# Patient Record
Sex: FEMALE | Race: OTHER | NOT HISPANIC OR LATINO
[De-identification: names, ages, dates, MRNs, and addresses within clinical notes are randomized per-mention and may not be internally consistent; named-entity substitution may affect disease eponyms.]

---

## 2017-02-07 ENCOUNTER — FORM ENCOUNTER (OUTPATIENT)
Age: 77
End: 2017-02-07

## 2017-02-08 ENCOUNTER — OUTPATIENT (OUTPATIENT)
Dept: OUTPATIENT SERVICES | Facility: HOSPITAL | Age: 77
LOS: 1 days | End: 2017-02-08
Payer: MEDICARE

## 2017-02-08 ENCOUNTER — APPOINTMENT (OUTPATIENT)
Dept: MRI IMAGING | Facility: CLINIC | Age: 77
End: 2017-02-08

## 2017-02-08 DIAGNOSIS — M47.14 OTHER SPONDYLOSIS WITH MYELOPATHY, THORACIC REGION: ICD-10-CM

## 2017-02-08 PROCEDURE — 72146 MRI CHEST SPINE W/O DYE: CPT

## 2017-02-11 ENCOUNTER — APPOINTMENT (OUTPATIENT)
Dept: MRI IMAGING | Facility: CLINIC | Age: 77
End: 2017-02-11

## 2017-02-17 DIAGNOSIS — E04.1 NONTOXIC SINGLE THYROID NODULE: ICD-10-CM

## 2017-02-22 ENCOUNTER — FORM ENCOUNTER (OUTPATIENT)
Age: 77
End: 2017-02-22

## 2017-02-23 ENCOUNTER — OUTPATIENT (OUTPATIENT)
Dept: OUTPATIENT SERVICES | Facility: HOSPITAL | Age: 77
LOS: 1 days | End: 2017-02-23
Payer: MEDICARE

## 2017-02-23 ENCOUNTER — APPOINTMENT (OUTPATIENT)
Dept: ULTRASOUND IMAGING | Facility: CLINIC | Age: 77
End: 2017-02-23

## 2017-02-23 DIAGNOSIS — E04.1 NONTOXIC SINGLE THYROID NODULE: ICD-10-CM

## 2017-02-23 PROCEDURE — 76536 US EXAM OF HEAD AND NECK: CPT

## 2017-03-04 ENCOUNTER — EMERGENCY (EMERGENCY)
Facility: HOSPITAL | Age: 77
LOS: 1 days | Discharge: HOME | End: 2017-03-04
Attending: EMERGENCY MEDICINE | Admitting: EMERGENCY MEDICINE
Payer: MEDICARE

## 2017-03-04 VITALS
SYSTOLIC BLOOD PRESSURE: 144 MMHG | HEART RATE: 69 BPM | DIASTOLIC BLOOD PRESSURE: 83 MMHG | RESPIRATION RATE: 16 BRPM | OXYGEN SATURATION: 99 % | TEMPERATURE: 98 F

## 2017-03-04 VITALS
OXYGEN SATURATION: 99 % | TEMPERATURE: 97 F | HEART RATE: 72 BPM | RESPIRATION RATE: 18 BRPM | WEIGHT: 119.93 LBS | HEIGHT: 63 IN | SYSTOLIC BLOOD PRESSURE: 146 MMHG | DIASTOLIC BLOOD PRESSURE: 80 MMHG

## 2017-03-04 DIAGNOSIS — R30.0 DYSURIA: ICD-10-CM

## 2017-03-04 DIAGNOSIS — M54.5 LOW BACK PAIN: ICD-10-CM

## 2017-03-04 DIAGNOSIS — M54.16 RADICULOPATHY, LUMBAR REGION: ICD-10-CM

## 2017-03-04 DIAGNOSIS — K21.9 GASTRO-ESOPHAGEAL REFLUX DISEASE WITHOUT ESOPHAGITIS: ICD-10-CM

## 2017-03-04 LAB
ALBUMIN SERPL ELPH-MCNC: 4.2 G/DL — SIGNIFICANT CHANGE UP (ref 3.3–5)
ALP SERPL-CCNC: 94 U/L — SIGNIFICANT CHANGE UP (ref 40–120)
ALT FLD-CCNC: 24 U/L RC — SIGNIFICANT CHANGE UP (ref 10–45)
ANION GAP SERPL CALC-SCNC: 13 MMOL/L — SIGNIFICANT CHANGE UP (ref 5–17)
APPEARANCE UR: CLEAR — SIGNIFICANT CHANGE UP
AST SERPL-CCNC: 28 U/L — SIGNIFICANT CHANGE UP (ref 10–40)
BACTERIA # UR AUTO: ABNORMAL /HPF
BASOPHILS # BLD AUTO: 0.1 K/UL — SIGNIFICANT CHANGE UP (ref 0–0.2)
BASOPHILS NFR BLD AUTO: 0.9 % — SIGNIFICANT CHANGE UP (ref 0–2)
BILIRUB SERPL-MCNC: 0.3 MG/DL — SIGNIFICANT CHANGE UP (ref 0.2–1.2)
BILIRUB UR-MCNC: NEGATIVE — SIGNIFICANT CHANGE UP
BUN SERPL-MCNC: 16 MG/DL — SIGNIFICANT CHANGE UP (ref 7–23)
CALCIUM SERPL-MCNC: 8.7 MG/DL — SIGNIFICANT CHANGE UP (ref 8.4–10.5)
CHLORIDE SERPL-SCNC: 104 MMOL/L — SIGNIFICANT CHANGE UP (ref 96–108)
CO2 SERPL-SCNC: 26 MMOL/L — SIGNIFICANT CHANGE UP (ref 22–31)
COLOR SPEC: SIGNIFICANT CHANGE UP
CREAT SERPL-MCNC: 0.6 MG/DL — SIGNIFICANT CHANGE UP (ref 0.5–1.3)
DIFF PNL FLD: NEGATIVE — SIGNIFICANT CHANGE UP
EOSINOPHIL # BLD AUTO: 0.1 K/UL — SIGNIFICANT CHANGE UP (ref 0–0.5)
EOSINOPHIL NFR BLD AUTO: 1.7 % — SIGNIFICANT CHANGE UP (ref 0–6)
EPI CELLS # UR: SIGNIFICANT CHANGE UP /HPF
GLUCOSE SERPL-MCNC: 92 MG/DL — SIGNIFICANT CHANGE UP (ref 70–99)
GLUCOSE UR QL: NEGATIVE — SIGNIFICANT CHANGE UP
HCT VFR BLD CALC: 41.7 % — SIGNIFICANT CHANGE UP (ref 34.5–45)
HGB BLD-MCNC: 14.2 G/DL — SIGNIFICANT CHANGE UP (ref 11.5–15.5)
KETONES UR-MCNC: NEGATIVE — SIGNIFICANT CHANGE UP
LEUKOCYTE ESTERASE UR-ACNC: ABNORMAL
LYMPHOCYTES # BLD AUTO: 2.3 K/UL — SIGNIFICANT CHANGE UP (ref 1–3.3)
LYMPHOCYTES # BLD AUTO: 37.2 % — SIGNIFICANT CHANGE UP (ref 13–44)
MCHC RBC-ENTMCNC: 32.2 PG — SIGNIFICANT CHANGE UP (ref 27–34)
MCHC RBC-ENTMCNC: 34.1 GM/DL — SIGNIFICANT CHANGE UP (ref 32–36)
MCV RBC AUTO: 94.4 FL — SIGNIFICANT CHANGE UP (ref 80–100)
MONOCYTES # BLD AUTO: 0.4 K/UL — SIGNIFICANT CHANGE UP (ref 0–0.9)
MONOCYTES NFR BLD AUTO: 7.3 % — SIGNIFICANT CHANGE UP (ref 2–14)
NEUTROPHILS # BLD AUTO: 3.2 K/UL — SIGNIFICANT CHANGE UP (ref 1.8–7.4)
NEUTROPHILS NFR BLD AUTO: 52.9 % — SIGNIFICANT CHANGE UP (ref 43–77)
NITRITE UR-MCNC: NEGATIVE — SIGNIFICANT CHANGE UP
PH UR: 6.5 — SIGNIFICANT CHANGE UP (ref 4.8–8)
PLATELET # BLD AUTO: 166 K/UL — SIGNIFICANT CHANGE UP (ref 150–400)
POTASSIUM SERPL-MCNC: 3.8 MMOL/L — SIGNIFICANT CHANGE UP (ref 3.5–5.3)
POTASSIUM SERPL-SCNC: 3.8 MMOL/L — SIGNIFICANT CHANGE UP (ref 3.5–5.3)
PROT SERPL-MCNC: 7.1 G/DL — SIGNIFICANT CHANGE UP (ref 6–8.3)
PROT UR-MCNC: NEGATIVE — SIGNIFICANT CHANGE UP
RBC # BLD: 4.42 M/UL — SIGNIFICANT CHANGE UP (ref 3.8–5.2)
RBC # FLD: 11.3 % — SIGNIFICANT CHANGE UP (ref 10.3–14.5)
RBC CASTS # UR COMP ASSIST: SIGNIFICANT CHANGE UP /HPF (ref 0–2)
SODIUM SERPL-SCNC: 143 MMOL/L — SIGNIFICANT CHANGE UP (ref 135–145)
SP GR SPEC: 1.01 — LOW (ref 1.01–1.02)
UROBILINOGEN FLD QL: NEGATIVE — SIGNIFICANT CHANGE UP
WBC # BLD: 6.1 K/UL — SIGNIFICANT CHANGE UP (ref 3.8–10.5)
WBC # FLD AUTO: 6.1 K/UL — SIGNIFICANT CHANGE UP (ref 3.8–10.5)
WBC UR QL: SIGNIFICANT CHANGE UP /HPF (ref 0–5)

## 2017-03-04 PROCEDURE — 80053 COMPREHEN METABOLIC PANEL: CPT

## 2017-03-04 PROCEDURE — 99284 EMERGENCY DEPT VISIT MOD MDM: CPT | Mod: 25

## 2017-03-04 PROCEDURE — 74176 CT ABD & PELVIS W/O CONTRAST: CPT

## 2017-03-04 PROCEDURE — 74176 CT ABD & PELVIS W/O CONTRAST: CPT | Mod: 26

## 2017-03-04 PROCEDURE — 85027 COMPLETE CBC AUTOMATED: CPT

## 2017-03-04 PROCEDURE — 99284 EMERGENCY DEPT VISIT MOD MDM: CPT | Mod: GC

## 2017-03-04 PROCEDURE — 96374 THER/PROPH/DIAG INJ IV PUSH: CPT

## 2017-03-04 PROCEDURE — 81001 URINALYSIS AUTO W/SCOPE: CPT

## 2017-03-04 PROCEDURE — 87086 URINE CULTURE/COLONY COUNT: CPT

## 2017-03-04 PROCEDURE — 96375 TX/PRO/DX INJ NEW DRUG ADDON: CPT

## 2017-03-04 RX ORDER — CEPHALEXIN 500 MG
1 CAPSULE ORAL
Qty: 15 | Refills: 0
Start: 2017-03-04 | End: 2017-03-09

## 2017-03-04 RX ORDER — DEXAMETHASONE 0.5 MG/5ML
10 ELIXIR ORAL ONCE
Qty: 0 | Refills: 0 | Status: COMPLETED | OUTPATIENT
Start: 2017-03-04 | End: 2017-03-04

## 2017-03-04 RX ORDER — ACETAMINOPHEN 500 MG
1000 TABLET ORAL ONCE
Qty: 0 | Refills: 0 | Status: COMPLETED | OUTPATIENT
Start: 2017-03-04 | End: 2017-03-04

## 2017-03-04 RX ORDER — CEFTRIAXONE 500 MG/1
1 INJECTION, POWDER, FOR SOLUTION INTRAMUSCULAR; INTRAVENOUS ONCE
Qty: 0 | Refills: 0 | Status: COMPLETED | OUTPATIENT
Start: 2017-03-04 | End: 2017-03-04

## 2017-03-04 RX ADMIN — Medication 10 MILLIGRAM(S): at 20:21

## 2017-03-04 RX ADMIN — Medication 1000 MILLIGRAM(S): at 20:21

## 2017-03-04 RX ADMIN — CEFTRIAXONE 100 GRAM(S): 500 INJECTION, POWDER, FOR SOLUTION INTRAMUSCULAR; INTRAVENOUS at 19:53

## 2017-03-04 RX ADMIN — Medication 400 MILLIGRAM(S): at 18:55

## 2017-03-04 NOTE — ED ADULT NURSE REASSESSMENT NOTE - NS ED NURSE REASSESS COMMENT FT1
received a 76 y.o female aaox3 ambulatory with c/o upper rt back pain, h/o chronic back pain secondary to rheumatoid arthritis, positive foot drop, on foot braces for ambulation, afebrile, Tylenol IVPB ongoing. Due meds given as ordered, pending CT scan results and MD disposition.

## 2017-03-04 NOTE — ED PROVIDER NOTE - ATTENDING CONTRIBUTION TO CARE
**ATTENDING ADDENDUM (Dr. Juan J Howard): I attest that I have directly examined this patient and reviewed and formulated the diagnostic and therapeutic management plan in collaboration with the EM resident. Please see MDM note and remainder of EMR for findings from CC, HPI, ROS, and PE. (WHIT Olson)

## 2017-03-04 NOTE — ED PROVIDER NOTE - MEDICAL DECISION MAKING DETAILS
**ATTENDING MEDICAL DECISION MAKING/SYNTHESIS (Dr. Juan J Howard): I have reviewed the Chief Complaint, the HPI, the ROS, and have directly performed and confirmed the findings on the Physical Examination. I have reviewed the medical decision making with all providers, as applicable. The PROBLEM REPRESENTATION at this time is: 76-year-old woman with prior history of muscular dystrophy/musculoskeletal disorder and osteoporosis with progressively worsening radicular, movement-associated lumbar back pain (right-sided) and dysuria, unassociated with fevers, chills, trauma, or new neurologic symptoms e.g.  saddle anesthesia, incontinence, acute urinary retention. POSITIVE difficulty with movement, range of motion, and ambulation. The MOST LIKELY DIAGNOSIS, and the LIST OF DIFFERENTIAL DIAGNOSES, includes (but is not limited to) the following: radicular back pain from disk or other impingement (likely), serious bacterial infection or sepsis/severe sepsis e.g. diskitis, epidural abscess or equivalent (unlikely), urinary tract infection or pyelonephritis (less likely but considered), osteoporosis- or rheumatologic arthritis-associated symptoms (possible), osteoarthritis (possible), sprain/strain. The likelihood of each of these diagnoses has been appropriately considered in the context of this patient's presentation and my evaluation. PLAN: as described in EMR, including diagnostics, therapeutics and consultation as clinically warranted. I will continue to reevaluate the patient, including the results of all testing, and monitor response to therapy throughout the patient's course in the ED.

## 2017-03-04 NOTE — ED PROVIDER NOTE - PHYSICAL EXAMINATION
Constitutional: mild distress  Eyes: PERRLA EOMI  Head: Normocephalic atraumatic  Cardiac: regular rate   Resp: Lungs CTAB  GI: Abd s/nt/nd  Neuro: CN2-12 intact  Skin: No rashes  MSK: + opposite leg raise. no midline ttp no saddle anesthesia. Constitutional: mild distress  Eyes: PERRLA EOMI  Head: Normocephalic atraumatic  Cardiac: regular rate   Resp: Lungs CTAB  GI: Abd s/nt/nd  Neuro: CN2-12 intact  Skin: No rashes  MSK: + opposite leg raise. no midline ttp no saddle anesthesia.  **ATTENDING ADDENDUM (Dr. Juan J Howard): I have reviewed and substantially contributed to the elements of the PE as documented above. I have directly performed an examination of this patient in conjunction with the other members (EM resident/PA/NP) of the patient care team. Of note, and in addition to the above, POSITIVE movement-associated, reproducible lumbar back pain, right-sided, with radicular component to right anterior leg. POSITIVE patellar reflexes 2+ symmetrically equal bilaterally. NO evidence of discoloration indicating acute arterial insufficiency at this time. NO unilateral soft-tissue swelling, cords, or calf tenderness noted. NO saddle anesthesia. NO cervical-thoracic-lumbar-sacral midline bony tenderness of stepoff. NO rashes, lesions, vesicles, cellulitis, petechiae, purpurae, track marks or ecchymoses. NO warmth over spine.

## 2017-03-04 NOTE — ED PROVIDER NOTE - DURATION
day(s)/**ATTENDING ADDENDUM (Dr. Juan J Howard): duration of two weeks, worsened more over past few days

## 2017-03-04 NOTE — ED PROVIDER NOTE - SKIN TEMP
**ATTENDING ADDENDUM (Dr. Juan J Howard): bilaterally, but without demarcation, bilateral upper and lower extremities (likely environmental)/COOL

## 2017-03-04 NOTE — ED PROVIDER NOTE - CONSTITUTIONAL NOURISHMENT, MLM
**ATTENDING ADDENDUM (Dr. Juan J Howard): chronically ill, with some atrophy noted, bilateral foot drop

## 2017-03-04 NOTE — ED PROVIDER NOTE - EYES, MLM
Clear bilaterally, pupils equal, round and reactive to light. **ATTENDING ADDENDUM (Dr. Juan J Howard): Extraocular muscle movements intact. NO nystagmus.

## 2017-03-04 NOTE — ED PROVIDER NOTE - NS ED ROS FT
Constitutional: No fever or chills  Eyes: No visual changes  HEENT: No throat pain  CV: No chest pain  Resp: No SOB no cough  GI: No abd pain, nausea or vomiting  : + dysuria  MSK: right flank pain  Skin: No rash  Neuro: No headache

## 2017-03-04 NOTE — ED PROVIDER NOTE - PLAN OF CARE
Take Tylenol 1000 mg or ibuprofen 600 mg every 6 hours with food as needed for pain. Follow up with your primary doctor tomorrow. Return to the Emergency Dept if you develop any new or worsening symptoms ATTENDING ADDENDUM (Dr. Juan J Howard): Goals of care include resolution of emergent/urgent symptoms and concerns, and restoration to baseline level of homeostasis.

## 2017-03-04 NOTE — ED PROVIDER NOTE - GASTROINTESTINAL, MLM
Abdomen soft, non-tender **ATTENDING ADDENDUM (Dr. Juan J Howard): NO guarding, rebound, or rigidity. NO pulsatile or non-pulsatile masses. NO hernias. NO obvious hepatosplenomegaly. NO tenderness over the right lower quadrant area with palpation.

## 2017-03-04 NOTE — ED PROVIDER NOTE - OBJECTIVE STATEMENT
76F hx muscular dystrophy presents with right flank pain. pain present for 2 weeks. Radiates down right leg. has tried multiple analgesics (cymbalta, mobic, lido patches) at home with minimal help. no fevers night sweats weight loss. no midline pain. + dysuria. No change in ability to walk. no numbness. no skin rash. pain shoots down leg worse with movement 76F hx muscular dystrophy presents with right flank pain. pain present for 2 weeks. Radiates down right leg. has tried multiple analgesics (cymbalta, mobic, lido patches) at home with minimal help. no fevers night sweats weight loss. no midline pain. + dysuria. No change in ability to walk. no numbness. no skin rash. pain shoots down leg worse with movement.  **ATTENDING ADDENDUM (Dr. Juan J Howard): I attest that I have directly and personally interviewed and examined this patient and elicited a comparable history of present illness and review of systems as documented, along with my EM resident. I attest that I have made significant contributions to the documentation where necessary and as noted in the EMR. Of note, and in addition to the above, patient is a 76-year-old woman with a past medical/surgical history of chronic pain from rheumatologic/musculoskeletal disorder (once characterized as Charcot-Kassidy-Tooth, but daughter and primary care physician/provider's have been questioning this prior diagnosis) now with progressively worsening right-sided radicular back pain (lumbar, radiating to the right lower extremity) over the past two weeks. NO fevers or chills. NO nausea or vomiting. NO acute urinary retention or incontinence, but with some dysuria (burning). POSITIVE back pain and stiffness, radicular, movement-associated, reproducible with 76F hx muscular dystrophy presents with right flank pain. pain present for 2 weeks. Radiates down right leg. has tried multiple analgesics (cymbalta, mobic, lido patches) at home with minimal help. no fevers night sweats weight loss. no midline pain. + dysuria. No change in ability to walk. no numbness. no skin rash. pain shoots down leg worse with movement.  **ATTENDING ADDENDUM (Dr. Juan J Howard): I attest that I have directly and personally interviewed and examined this patient and elicited a comparable history of present illness and review of systems as documented, along with my EM resident. I attest that I have made significant contributions to the documentation where necessary and as noted in the EMR. Of note, and in addition to the above, patient is a 76-year-old woman with a past medical/surgical history of chronic pain from rheumatologic/musculoskeletal disorder (once characterized as Charcot-Kassidy-Tooth, but daughter and primary care physician/provider's have been questioning this prior diagnosis) now with progressively worsening right-sided radicular back pain (lumbar, radiating to the right lower extremity) over the past two weeks. NO fevers or chills. NO nausea or vomiting. NO acute urinary retention or incontinence, but with some dysuria (burning). POSITIVE back pain and stiffness, radicular, movement-associated, reproducible with palpation and twisting. NO relief with lidocaine transdermal, Mobic, and Cymbalta. Daughter reports some distal discoloration and coldness of the bilateral lower extremities (POSITIVE chronic foot drop in the bilateral lower extremities). NO saddle anesthesia. NO new falls. NO rashes in the back. Here for evaluation. VAS 3/10. POSITIVE walks with walker chronically (non-adherent at times).

## 2017-03-04 NOTE — ED ADULT NURSE NOTE - OBJECTIVE STATEMENT
76y f pt with hx of osteoporosis; drop foot; sarcoma retooth; 76y f pt with hx of osteoporosis; drop foot; sarcoma retooth; c/o r flank pain radiating to r abd and down to groin; pt states has been going on for 3 weeks; aox3; no resp distress; + sob on exertion; no chest pain; no n/v/d; + constipation; + dysuria; + burning on urination; + urinary retention; no numbness/tingling; no incontinence; daughter says pt lower legs have changed color; pt having more difficulty ambulating; iv placed; labs sent per md order

## 2017-03-04 NOTE — ED PROVIDER NOTE - PROGRESS NOTE DETAILS
**ATTENDING ADDENDUM (Dr. Juan J Howard): case reviewed with primary care physician/provider LOLA Smith (neurology). [phone]. Aware and agrees with goals/plan of ED care at this time. Anticipatory guidance provided. Will continue to observe and monitor closely. **ATTENDING ADDENDUM (Dr. Juan J Howard): case reviewed with Dr. Grover. Patient awaiting completion of ED diagnostics. Will continue to observe and monitor closely. Anticipatory guidance provided. **ATTENDING ADDENDUM (Dr. Juan J Howard): patient serially evaluated throughout ED course. NO acute deterioration up to this time in the ED. Awaiting completion of CT. UA noted. Agree with dexamethasone and with antibiotics as ordered. Will continue to observe and monitor closely. **ATTENDING ADDENDUM (Dr. Juan J Howard): case reviewed with primary care physician/provider LOLA Smith (neurology). [phone]. Aware and agrees with goals/plan of ED care at this time. Agrees with deferring ED neurology consultation at this time pending ED workup results. Anticipatory guidance provided. Will continue to observe and monitor closely. **ATTENDING ADDENDUM (Dr. Juan J Howard): patient serially evaluated throughout ED course. NO acute deterioration up to this time in the ED. Preliminary CT results and results of other ED diagnostics reviewed with patient and family. Awaiting formal attending results. Anticipatory guidance provided. Will continue to observe and monitor closely. **ATTENDING ADDENDUM (Dr. Juan J Howard): patient serially evaluated throughout ED course. NO acute deterioration up to this time in the ED. POSITIVE improved at time of discharge (ambulatory). Anticipatory guidance provided re: steroids given in ED, need for neurology followup with primary care physician/provider Smith for physical therapy/occupational therapy and for therapeutic/diagnostic intensity (e.g. MRI for possible disk herniation), and for antibiotics (based on UA and dysuria). Agree with discharge home with close outpatient followup with primary care physician/provider and with medications (if appropriate, if clinically indicated, and as prescribed, as noted in EMR). NO evidence of fracture, serious bacterial infection or sepsis/severe sepsis, or acute cord/spine emergency requiring spine consultation at this time. NO evidence of diskitis at this time.

## 2017-03-04 NOTE — ED PROVIDER NOTE - CHPI ED SYMPTOMS NEG
**ATTENDING ADDENDUM (Dr. Juan J Howard): POSITIVE chronic foot drop (bilaterally)/no bowel dysfunction/no numbness/no bladder dysfunction/no tingling

## 2017-03-04 NOTE — ED PROVIDER NOTE - MUSCULOSKELETAL [+], MLM
**ATTENDING ADDENDUM (Dr. Juan J Howard): NO saddle anesthesia, NO incontinence, NO acute urinary retention, POSITIVE chronic back pain, POSITIVE chronic arthritis, POSITIVE musculoskeletal disease (chronic), POSITIVE history of osteoporosis/BACK PAIN/NECK PAIN

## 2017-03-04 NOTE — ED PROVIDER NOTE - CHPI ED SYMPTOMS POS
**ATTENDING ADDENDUM (Dr. Juan J Howard): radicular right-sided lumbar pain that wraps around anteriorly and down anterior right leg; also reports neck pain (left paraspinal area greater than right)/PAIN/TENDERNESS/BACK PAIN/NECK TENDERNESS/STIFFNESS/DIFFICULTY BEARING WEIGHT

## 2017-03-04 NOTE — ED PROVIDER NOTE - RELIEVING FACTORS
none/**ATTENDING ADDENDUM (Dr. Juan J Howard): transdermal lidocaine, Cymbalta, Mobic/over the counter medication

## 2017-03-04 NOTE — ED PROVIDER NOTE - CONDUCTED A DETAILED DISCUSSION WITH PATIENT AND/OR GUARDIAN REGARDING, MDM
need for outpatient follow-up/**ATTENDING ADDENDUM (Dr. Juan J Howard): Anticipatory guidance provided./lab results/radiology results/return to ED if symptoms worsen, persist or questions arise

## 2017-03-04 NOTE — ED PROVIDER NOTE - MUSCULOSKELETAL MINIMAL EXAM
neck supple/**ATTENDING ADDENDUM (Dr. Juan J Howard): POSITIVE right-sided lumbar spine pain, radicular, with radiation to the anterior right leg/TENDERNESS/RANGE OF MOTION LIMITED

## 2017-03-04 NOTE — ED ADULT NURSE NOTE - CHIEF COMPLAINT
The patient is a 76y Female complaining of The patient is a 76y Female complaining of flank pain with radiation

## 2017-03-04 NOTE — ED PROVIDER NOTE - RESPIRATORY, MLM
Breath sounds clear and equal bilaterally. **ATTENDING ADDENDUM (Dr. Juan J Howard): NO wheezing, rales, rhonchi, crackles, stridor, drooling, retractions, nasal flaring, or tripoding.

## 2017-03-04 NOTE — ED PROVIDER NOTE - CARE PLAN
Principal Discharge DX:	Back pain  Instructions for follow-up, activity and diet:	Take Tylenol 1000 mg or ibuprofen 600 mg every 6 hours with food as needed for pain. Follow up with your primary doctor tomorrow. Return to the Emergency Dept if you develop any new or worsening symptoms Principal Discharge DX:	Back pain  Goal:	ATTENDING ADDENDUM (Dr. Juan J Howard): Goals of care include resolution of emergent/urgent symptoms and concerns, and restoration to baseline level of homeostasis.  Instructions for follow-up, activity and diet:	Take Tylenol 1000 mg or ibuprofen 600 mg every 6 hours with food as needed for pain. Follow up with your primary doctor tomorrow. Return to the Emergency Dept if you develop any new or worsening symptoms  Secondary Diagnosis:	Radicular pain of lumbosacral region

## 2017-03-04 NOTE — ED PROVIDER NOTE - SKIN COLOR
**ATTENDING ADDENDUM (Dr. Juan J Howard): slight venous stasis skin changes noted in the bilateral lower extremities without severe edema

## 2017-03-05 LAB
CULTURE RESULTS: SIGNIFICANT CHANGE UP
SPECIMEN SOURCE: SIGNIFICANT CHANGE UP

## 2017-03-06 ENCOUNTER — FORM ENCOUNTER (OUTPATIENT)
Age: 77
End: 2017-03-06

## 2017-03-07 ENCOUNTER — OUTPATIENT (OUTPATIENT)
Dept: OUTPATIENT SERVICES | Facility: HOSPITAL | Age: 77
LOS: 1 days | End: 2017-03-07
Payer: MEDICARE

## 2017-03-07 ENCOUNTER — APPOINTMENT (OUTPATIENT)
Dept: MRI IMAGING | Facility: CLINIC | Age: 77
End: 2017-03-07

## 2017-03-07 DIAGNOSIS — M54.16 RADICULOPATHY, LUMBAR REGION: ICD-10-CM

## 2017-03-07 PROCEDURE — 72148 MRI LUMBAR SPINE W/O DYE: CPT

## 2017-04-13 ENCOUNTER — FORM ENCOUNTER (OUTPATIENT)
Age: 77
End: 2017-04-13

## 2017-04-14 ENCOUNTER — APPOINTMENT (OUTPATIENT)
Dept: NEUROLOGY | Facility: CLINIC | Age: 77
End: 2017-04-14

## 2017-04-14 ENCOUNTER — APPOINTMENT (OUTPATIENT)
Dept: CT IMAGING | Facility: CLINIC | Age: 77
End: 2017-04-14

## 2017-04-14 ENCOUNTER — OUTPATIENT (OUTPATIENT)
Dept: OUTPATIENT SERVICES | Facility: HOSPITAL | Age: 77
LOS: 1 days | End: 2017-04-14
Payer: MEDICARE

## 2017-04-14 VITALS
SYSTOLIC BLOOD PRESSURE: 137 MMHG | HEART RATE: 98 BPM | HEIGHT: 62 IN | WEIGHT: 130 LBS | DIASTOLIC BLOOD PRESSURE: 79 MMHG | BODY MASS INDEX: 23.92 KG/M2

## 2017-04-14 DIAGNOSIS — S32.9XXA FRACTURE OF UNSPECIFIED PARTS OF LUMBOSACRAL SPINE AND PELVIS, INITIAL ENCOUNTER FOR CLOSED FRACTURE: ICD-10-CM

## 2017-04-14 PROCEDURE — 76376 3D RENDER W/INTRP POSTPROCES: CPT

## 2017-04-14 PROCEDURE — 72192 CT PELVIS W/O DYE: CPT

## 2017-04-14 RX ORDER — CEPHALEXIN 500 MG/1
500 CAPSULE ORAL
Qty: 15 | Refills: 0 | Status: DISCONTINUED | COMMUNITY
Start: 2017-03-04

## 2017-04-14 RX ORDER — DULOXETINE HYDROCHLORIDE 20 MG/1
20 CAPSULE, DELAYED RELEASE PELLETS ORAL
Qty: 30 | Refills: 0 | Status: DISCONTINUED | COMMUNITY
Start: 2016-06-14

## 2017-04-20 ENCOUNTER — RX RENEWAL (OUTPATIENT)
Age: 77
End: 2017-04-20

## 2017-05-09 ENCOUNTER — RX RENEWAL (OUTPATIENT)
Age: 77
End: 2017-05-09

## 2017-05-16 ENCOUNTER — APPOINTMENT (OUTPATIENT)
Dept: PAIN MANAGEMENT | Facility: CLINIC | Age: 77
End: 2017-05-16

## 2017-06-14 ENCOUNTER — APPOINTMENT (OUTPATIENT)
Dept: PAIN MANAGEMENT | Facility: CLINIC | Age: 77
End: 2017-06-14

## 2017-06-14 VITALS
DIASTOLIC BLOOD PRESSURE: 81 MMHG | SYSTOLIC BLOOD PRESSURE: 151 MMHG | BODY MASS INDEX: 23.92 KG/M2 | HEIGHT: 62 IN | HEART RATE: 80 BPM | WEIGHT: 130 LBS

## 2017-08-08 ENCOUNTER — APPOINTMENT (OUTPATIENT)
Dept: PHYSICAL MEDICINE AND REHAB | Facility: CLINIC | Age: 77
End: 2017-08-08
Payer: MEDICARE

## 2017-08-08 VITALS
DIASTOLIC BLOOD PRESSURE: 75 MMHG | OXYGEN SATURATION: 97 % | HEART RATE: 83 BPM | SYSTOLIC BLOOD PRESSURE: 123 MMHG | TEMPERATURE: 98 F

## 2017-08-08 PROCEDURE — 99213 OFFICE O/P EST LOW 20 MIN: CPT

## 2017-09-27 ENCOUNTER — APPOINTMENT (OUTPATIENT)
Dept: PAIN MANAGEMENT | Facility: CLINIC | Age: 77
End: 2017-09-27
Payer: MEDICARE

## 2017-09-27 VITALS
SYSTOLIC BLOOD PRESSURE: 114 MMHG | DIASTOLIC BLOOD PRESSURE: 73 MMHG | HEIGHT: 62 IN | WEIGHT: 130 LBS | BODY MASS INDEX: 23.92 KG/M2 | HEART RATE: 93 BPM

## 2017-09-27 PROCEDURE — 99213 OFFICE O/P EST LOW 20 MIN: CPT | Mod: 25

## 2017-09-27 PROCEDURE — 20552 NJX 1/MLT TRIGGER POINT 1/2: CPT

## 2017-11-14 ENCOUNTER — APPOINTMENT (OUTPATIENT)
Dept: GERIATRICS | Facility: CLINIC | Age: 77
End: 2017-11-14
Payer: MEDICARE

## 2017-11-14 VITALS
DIASTOLIC BLOOD PRESSURE: 74 MMHG | WEIGHT: 131 LBS | SYSTOLIC BLOOD PRESSURE: 110 MMHG | TEMPERATURE: 98 F | RESPIRATION RATE: 16 BRPM | OXYGEN SATURATION: 95 % | HEIGHT: 62 IN | HEART RATE: 102 BPM | BODY MASS INDEX: 24.11 KG/M2

## 2017-11-14 DIAGNOSIS — G95.9 DISEASE OF SPINAL CORD, UNSPECIFIED: ICD-10-CM

## 2017-11-14 PROCEDURE — 99214 OFFICE O/P EST MOD 30 MIN: CPT

## 2017-11-14 RX ORDER — LIDOCAINE 5% 700 MG/1
5 PATCH TOPICAL
Qty: 30 | Refills: 3 | Status: COMPLETED | COMMUNITY
Start: 2017-04-14 | End: 2017-11-14

## 2017-11-16 LAB
ALBUMIN SERPL ELPH-MCNC: 3.9 G/DL
ALP BLD-CCNC: 99 U/L
ALT SERPL-CCNC: 21 U/L
ANION GAP SERPL CALC-SCNC: 15 MMOL/L
AST SERPL-CCNC: 26 U/L
BASOPHILS # BLD AUTO: 0.02 K/UL
BASOPHILS NFR BLD AUTO: 0.3 %
BILIRUB SERPL-MCNC: 0.3 MG/DL
BUN SERPL-MCNC: 20 MG/DL
CALCIUM SERPL-MCNC: 9.7 MG/DL
CHLORIDE SERPL-SCNC: 104 MMOL/L
CHOLEST SERPL-MCNC: 228 MG/DL
CHOLEST/HDLC SERPL: 2.9 RATIO
CO2 SERPL-SCNC: 26 MMOL/L
CREAT SERPL-MCNC: 0.78 MG/DL
EOSINOPHIL # BLD AUTO: 0.04 K/UL
EOSINOPHIL NFR BLD AUTO: 0.7 %
FOLATE SERPL-MCNC: 14.1 NG/ML
GLUCOSE SERPL-MCNC: 114 MG/DL
HCT VFR BLD CALC: 41.8 %
HDLC SERPL-MCNC: 80 MG/DL
HGB BLD-MCNC: 13.7 G/DL
IMM GRANULOCYTES NFR BLD AUTO: 0.2 %
LDLC SERPL CALC-MCNC: 122 MG/DL
LYMPHOCYTES # BLD AUTO: 2.44 K/UL
LYMPHOCYTES NFR BLD AUTO: 42.1 %
MAN DIFF?: NORMAL
MCHC RBC-ENTMCNC: 31.2 PG
MCHC RBC-ENTMCNC: 32.8 GM/DL
MCV RBC AUTO: 95.2 FL
MONOCYTES # BLD AUTO: 0.48 K/UL
MONOCYTES NFR BLD AUTO: 8.3 %
NEUTROPHILS # BLD AUTO: 2.81 K/UL
NEUTROPHILS NFR BLD AUTO: 48.4 %
PLATELET # BLD AUTO: 204 K/UL
POTASSIUM SERPL-SCNC: 4.2 MMOL/L
PROT SERPL-MCNC: 6.4 G/DL
RBC # BLD: 4.39 M/UL
RBC # FLD: 14 %
SODIUM SERPL-SCNC: 145 MMOL/L
TRIGL SERPL-MCNC: 130 MG/DL
TSH SERPL-ACNC: 3.26 UIU/ML
VIT B12 SERPL-MCNC: 633 PG/ML
WBC # FLD AUTO: 5.8 K/UL

## 2017-11-29 ENCOUNTER — RX RENEWAL (OUTPATIENT)
Age: 77
End: 2017-11-29

## 2018-01-08 ENCOUNTER — RX RENEWAL (OUTPATIENT)
Age: 78
End: 2018-01-08

## 2018-04-17 ENCOUNTER — RX RENEWAL (OUTPATIENT)
Age: 78
End: 2018-04-17

## 2018-06-05 ENCOUNTER — NON-APPOINTMENT (OUTPATIENT)
Age: 78
End: 2018-06-05

## 2018-06-05 ENCOUNTER — APPOINTMENT (OUTPATIENT)
Dept: GERIATRICS | Facility: CLINIC | Age: 78
End: 2018-06-05
Payer: MEDICARE

## 2018-06-05 VITALS
SYSTOLIC BLOOD PRESSURE: 150 MMHG | HEART RATE: 78 BPM | TEMPERATURE: 97.3 F | BODY MASS INDEX: 24.33 KG/M2 | OXYGEN SATURATION: 96 % | WEIGHT: 133 LBS | DIASTOLIC BLOOD PRESSURE: 80 MMHG

## 2018-06-05 PROCEDURE — 99215 OFFICE O/P EST HI 40 MIN: CPT

## 2018-06-06 LAB
ALBUMIN SERPL ELPH-MCNC: 4.2 G/DL
ALP BLD-CCNC: 99 U/L
ALT SERPL-CCNC: 22 U/L
ANION GAP SERPL CALC-SCNC: 13 MMOL/L
AST SERPL-CCNC: 27 U/L
BASOPHILS # BLD AUTO: 0.02 K/UL
BASOPHILS NFR BLD AUTO: 0.3 %
BILIRUB SERPL-MCNC: 0.6 MG/DL
BUN SERPL-MCNC: 17 MG/DL
CALCIUM SERPL-MCNC: 9.4 MG/DL
CHLORIDE SERPL-SCNC: 102 MMOL/L
CO2 SERPL-SCNC: 27 MMOL/L
CREAT SERPL-MCNC: 0.66 MG/DL
EOSINOPHIL # BLD AUTO: 0.08 K/UL
EOSINOPHIL NFR BLD AUTO: 1.4 %
GLUCOSE SERPL-MCNC: 92 MG/DL
HCT VFR BLD CALC: 43.1 %
HGB BLD-MCNC: 14.1 G/DL
IMM GRANULOCYTES NFR BLD AUTO: 0.2 %
LYMPHOCYTES # BLD AUTO: 2.39 K/UL
LYMPHOCYTES NFR BLD AUTO: 40.6 %
MAN DIFF?: NORMAL
MCHC RBC-ENTMCNC: 30.9 PG
MCHC RBC-ENTMCNC: 32.7 GM/DL
MCV RBC AUTO: 94.3 FL
MONOCYTES # BLD AUTO: 0.37 K/UL
MONOCYTES NFR BLD AUTO: 6.3 %
NEUTROPHILS # BLD AUTO: 3.02 K/UL
NEUTROPHILS NFR BLD AUTO: 51.2 %
PLATELET # BLD AUTO: 185 K/UL
POTASSIUM SERPL-SCNC: 4.2 MMOL/L
PROT SERPL-MCNC: 6.5 G/DL
RBC # BLD: 4.57 M/UL
RBC # FLD: 13.6 %
SODIUM SERPL-SCNC: 142 MMOL/L
TSH SERPL-ACNC: 2.91 UIU/ML
WBC # FLD AUTO: 5.89 K/UL

## 2018-06-18 ENCOUNTER — RX RENEWAL (OUTPATIENT)
Age: 78
End: 2018-06-18

## 2018-09-25 ENCOUNTER — APPOINTMENT (OUTPATIENT)
Dept: GERIATRICS | Facility: CLINIC | Age: 78
End: 2018-09-25
Payer: MEDICARE

## 2018-09-25 VITALS
OXYGEN SATURATION: 98 % | TEMPERATURE: 98.6 F | BODY MASS INDEX: 23.34 KG/M2 | WEIGHT: 127.6 LBS | DIASTOLIC BLOOD PRESSURE: 70 MMHG | SYSTOLIC BLOOD PRESSURE: 122 MMHG | HEART RATE: 84 BPM

## 2018-09-25 PROCEDURE — 99214 OFFICE O/P EST MOD 30 MIN: CPT

## 2018-09-25 RX ORDER — PREGABALIN 25 MG/1
25 CAPSULE ORAL
Qty: 30 | Refills: 0 | Status: COMPLETED | COMMUNITY
Start: 2018-08-03 | End: 2018-09-25

## 2018-09-25 RX ORDER — PREGABALIN 25 MG/1
25 CAPSULE ORAL
Qty: 30 | Refills: 1 | Status: COMPLETED | COMMUNITY
Start: 2017-11-14 | End: 2018-09-25

## 2018-09-25 RX ORDER — OXYCODONE 5 MG/1
5 TABLET ORAL
Qty: 30 | Refills: 0 | Status: COMPLETED | COMMUNITY
Start: 2018-06-05 | End: 2018-09-25

## 2018-11-27 ENCOUNTER — APPOINTMENT (OUTPATIENT)
Dept: GERIATRICS | Facility: CLINIC | Age: 78
End: 2018-11-27
Payer: MEDICARE

## 2018-11-27 VITALS
WEIGHT: 131 LBS | SYSTOLIC BLOOD PRESSURE: 120 MMHG | DIASTOLIC BLOOD PRESSURE: 70 MMHG | TEMPERATURE: 97.9 F | HEART RATE: 88 BPM | OXYGEN SATURATION: 96 % | HEIGHT: 62 IN | BODY MASS INDEX: 24.11 KG/M2 | RESPIRATION RATE: 16 BRPM

## 2018-11-27 DIAGNOSIS — E78.5 HYPERLIPIDEMIA, UNSPECIFIED: ICD-10-CM

## 2018-11-27 PROCEDURE — 99215 OFFICE O/P EST HI 40 MIN: CPT

## 2018-11-28 ENCOUNTER — RX RENEWAL (OUTPATIENT)
Age: 78
End: 2018-11-28

## 2019-02-06 ENCOUNTER — APPOINTMENT (OUTPATIENT)
Dept: VASCULAR SURGERY | Facility: CLINIC | Age: 79
End: 2019-02-06
Payer: MEDICARE

## 2019-02-06 VITALS
HEIGHT: 61 IN | BODY MASS INDEX: 24.55 KG/M2 | TEMPERATURE: 98.2 F | HEART RATE: 78 BPM | WEIGHT: 130 LBS | DIASTOLIC BLOOD PRESSURE: 76 MMHG | SYSTOLIC BLOOD PRESSURE: 130 MMHG

## 2019-02-06 PROCEDURE — 93970 EXTREMITY STUDY: CPT

## 2019-02-06 PROCEDURE — 99203 OFFICE O/P NEW LOW 30 MIN: CPT

## 2019-03-06 ENCOUNTER — RESULT REVIEW (OUTPATIENT)
Age: 79
End: 2019-03-06

## 2019-04-18 ENCOUNTER — RX RENEWAL (OUTPATIENT)
Age: 79
End: 2019-04-18

## 2019-05-31 ENCOUNTER — APPOINTMENT (OUTPATIENT)
Dept: MAMMOGRAPHY | Facility: IMAGING CENTER | Age: 79
End: 2019-05-31
Payer: MEDICARE

## 2019-05-31 ENCOUNTER — APPOINTMENT (OUTPATIENT)
Dept: RADIOLOGY | Facility: IMAGING CENTER | Age: 79
End: 2019-05-31
Payer: MEDICARE

## 2019-05-31 ENCOUNTER — OUTPATIENT (OUTPATIENT)
Dept: OUTPATIENT SERVICES | Facility: HOSPITAL | Age: 79
LOS: 1 days | End: 2019-05-31
Payer: MEDICARE

## 2019-05-31 DIAGNOSIS — Z00.00 ENCOUNTER FOR GENERAL ADULT MEDICAL EXAMINATION WITHOUT ABNORMAL FINDINGS: ICD-10-CM

## 2019-05-31 PROCEDURE — 77063 BREAST TOMOSYNTHESIS BI: CPT | Mod: 26

## 2019-05-31 PROCEDURE — 77063 BREAST TOMOSYNTHESIS BI: CPT

## 2019-05-31 PROCEDURE — 77067 SCR MAMMO BI INCL CAD: CPT | Mod: 26

## 2019-05-31 PROCEDURE — 77080 DXA BONE DENSITY AXIAL: CPT | Mod: 26

## 2019-05-31 PROCEDURE — 77067 SCR MAMMO BI INCL CAD: CPT

## 2019-05-31 PROCEDURE — 77080 DXA BONE DENSITY AXIAL: CPT

## 2019-12-31 ENCOUNTER — RX RENEWAL (OUTPATIENT)
Age: 79
End: 2019-12-31

## 2020-01-27 ENCOUNTER — APPOINTMENT (OUTPATIENT)
Dept: GERIATRICS | Facility: CLINIC | Age: 80
End: 2020-01-27
Payer: MEDICARE

## 2020-01-27 VITALS
BODY MASS INDEX: 26.09 KG/M2 | OXYGEN SATURATION: 95 % | TEMPERATURE: 98.2 F | HEART RATE: 83 BPM | DIASTOLIC BLOOD PRESSURE: 78 MMHG | WEIGHT: 138.2 LBS | SYSTOLIC BLOOD PRESSURE: 132 MMHG | RESPIRATION RATE: 16 BRPM | HEIGHT: 61 IN

## 2020-01-27 DIAGNOSIS — Z23 ENCOUNTER FOR IMMUNIZATION: ICD-10-CM

## 2020-01-27 DIAGNOSIS — M19.90 UNSPECIFIED OSTEOARTHRITIS, UNSPECIFIED SITE: ICD-10-CM

## 2020-01-27 DIAGNOSIS — R63.5 ABNORMAL WEIGHT GAIN: ICD-10-CM

## 2020-01-27 DIAGNOSIS — M21.372 FOOT DROP, RIGHT FOOT: ICD-10-CM

## 2020-01-27 DIAGNOSIS — M21.371 FOOT DROP, RIGHT FOOT: ICD-10-CM

## 2020-01-27 DIAGNOSIS — M81.0 AGE-RELATED OSTEOPOROSIS W/OUT CURRENT PATHOLOGICAL FRACTURE: ICD-10-CM

## 2020-01-27 PROCEDURE — 99214 OFFICE O/P EST MOD 30 MIN: CPT

## 2020-01-28 NOTE — HISTORY OF PRESENT ILLNESS
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 Pt has not fallen, no ED visits, no hospital visits. Daughters both here, Analia and Fátima, saying that they have noticed her repeating more often, doing less on her own.  Asking same questions over and over.  Daughters are frustrated.\par \par Pt denies pain, ROS negaitve except for lower leg neuropathic pain that is chronic

## 2020-01-28 NOTE — PHYSICAL EXAM
[General Appearance - Alert] : alert [General Appearance - In No Acute Distress] : in no acute distress [Sclera] : the sclera and conjunctiva were normal [PERRL With Normal Accommodation] : pupils were equal in size, round, and reactive to light [Extraocular Movements] : extraocular movements were intact [Normal Oral Mucosa] : normal oral mucosa [No Oral Pallor] : no oral pallor [No Oral Cyanosis] : no oral cyanosis [Outer Ear] : the ears and nose were normal in appearance [Oropharynx] : The oropharynx was normal [Neck Appearance] : the appearance of the neck was normal [Neck Cervical Mass (___cm)] : no neck mass was observed [Jugular Venous Distention Increased] : there was no jugular-venous distention [Thyroid Diffuse Enlargement] : the thyroid was not enlarged [Thyroid Nodule] : there were no palpable thyroid nodules [Auscultation Breath Sounds / Voice Sounds] : lungs were clear to auscultation bilaterally [Heart Rate And Rhythm] : heart rate was normal and rhythm regular [Heart Sounds] : normal S1 and S2 [Heart Sounds Gallop] : no gallops [Murmurs] : no murmurs [Heart Sounds Pericardial Friction Rub] : no pericardial rub [Full Pulse] : the pedal pulses are present [Edema] : there was no peripheral edema [Breast Appearance] : normal in appearance [Breast Palpation Mass] : no palpable masses [Bowel Sounds] : normal bowel sounds [Abdomen Soft] : soft [Abdomen Tenderness] : non-tender [Abdomen Mass (___ Cm)] : no abdominal mass palpated [External Female Genitalia] : normal external genitalia [Urethral Meatus] : normal urethra [Urinary Bladder Findings] : the bladder was normal on palpation [Cervical Lymph Nodes Enlarged Posterior Bilaterally] : posterior cervical [Cervical Lymph Nodes Enlarged Anterior Bilaterally] : anterior cervical [Supraclavicular Lymph Nodes Enlarged Bilaterally] : supraclavicular [Axillary Lymph Nodes Enlarged Bilaterally] : axillary [Femoral Lymph Nodes Enlarged Bilaterally] : femoral [Inguinal Lymph Nodes Enlarged Bilaterally] : inguinal [No CVA Tenderness] : no ~M costovertebral angle tenderness [No Spinal Tenderness] : no spinal tenderness [Abnormal Walk] : normal gait [Nail Clubbing] : no clubbing  or cyanosis of the fingernails [Musculoskeletal - Swelling] : no joint swelling seen [Motor Tone] : muscle strength and tone were normal [Skin Color & Pigmentation] : normal skin color and pigmentation [Skin Turgor] : normal skin turgor [] : no rash [FreeTextEntry1] : atrohy in hands and lower legs; unable to do MMSE bc only had 3rd grade education - clock draw poor [Oriented To Time, Place, And Person] : oriented to person, place, and time [Impaired Insight] : insight and judgment were intact [Affect] : the affect was normal

## 2020-01-28 NOTE — SOCIAL HISTORY
[FreeTextEntry1] : elisha and ellis [FreeTextEntry2] : good [FreeTextEntry4] : good [One fall no injury in past year] : Patient reported one fall in the past year without injury [Some assistance needed] : walking [Full assistance needed] : managing finances [Walker] : walker [Canes] : rubens [de-identified] : No safety concerns identified. Discussed safety [de-identified] : No safety concerns identified.

## 2020-01-28 NOTE — ASSESSMENT
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 Has had MRI in past and labs. Labs reviewed. Will start aricept 5 mg a day. \par 2) Hearing - history of typanic membrane injury.  ENT\par 3) GI referral due for colonoscopy, reflux, \par 4) HM - immunizations - shingrix due.\par \par Long discussion held with daughters about pts behavior and needs, level of frustration with more childlike behavior. Deny agitation.

## 2020-02-26 LAB
ALBUMIN SERPL ELPH-MCNC: 4.1 G/DL
ALP BLD-CCNC: 84 U/L
ALT SERPL-CCNC: 16 U/L
ANION GAP SERPL CALC-SCNC: 10 MMOL/L
AST SERPL-CCNC: 23 U/L
BASOPHILS # BLD AUTO: 0.06 K/UL
BASOPHILS NFR BLD AUTO: 1 %
BILIRUB SERPL-MCNC: 0.6 MG/DL
BUN SERPL-MCNC: 11 MG/DL
CALCIUM SERPL-MCNC: 9.4 MG/DL
CHLORIDE SERPL-SCNC: 104 MMOL/L
CO2 SERPL-SCNC: 27 MMOL/L
CREAT SERPL-MCNC: 0.63 MG/DL
EOSINOPHIL # BLD AUTO: 0.06 K/UL
EOSINOPHIL NFR BLD AUTO: 1 %
ERYTHROCYTE [SEDIMENTATION RATE] IN BLOOD BY WESTERGREN METHOD: 24 MM/HR
FOLATE SERPL-MCNC: 16.9 NG/ML
GLUCOSE SERPL-MCNC: 99 MG/DL
HCT VFR BLD CALC: 42.2 %
HGB BLD-MCNC: 13.5 G/DL
IMM GRANULOCYTES NFR BLD AUTO: 0.2 %
LYMPHOCYTES # BLD AUTO: 2.17 K/UL
LYMPHOCYTES NFR BLD AUTO: 34.5 %
MAN DIFF?: NORMAL
MCHC RBC-ENTMCNC: 30.9 PG
MCHC RBC-ENTMCNC: 32 GM/DL
MCV RBC AUTO: 96.6 FL
MONOCYTES # BLD AUTO: 0.53 K/UL
MONOCYTES NFR BLD AUTO: 8.4 %
NEUTROPHILS # BLD AUTO: 3.46 K/UL
NEUTROPHILS NFR BLD AUTO: 54.9 %
PLATELET # BLD AUTO: 224 K/UL
POTASSIUM SERPL-SCNC: 4.4 MMOL/L
PROT SERPL-MCNC: 6.1 G/DL
RBC # BLD: 4.37 M/UL
RBC # FLD: 13 %
SODIUM SERPL-SCNC: 141 MMOL/L
TSH SERPL-ACNC: 1.8 UIU/ML
VIT B12 SERPL-MCNC: 1729 PG/ML
WBC # FLD AUTO: 6.29 K/UL

## 2020-04-22 ENCOUNTER — APPOINTMENT (OUTPATIENT)
Dept: GERIATRICS | Facility: CLINIC | Age: 80
End: 2020-04-22
Payer: MEDICARE

## 2020-04-22 DIAGNOSIS — Z87.898 PERSONAL HISTORY OF OTHER SPECIFIED CONDITIONS: ICD-10-CM

## 2020-04-22 PROCEDURE — 99215 OFFICE O/P EST HI 40 MIN: CPT | Mod: 95

## 2020-04-30 ENCOUNTER — LABORATORY RESULT (OUTPATIENT)
Age: 80
End: 2020-04-30

## 2020-05-01 NOTE — ED PROVIDER NOTE - RADIATION
No
**ATTENDING ADDENDUM (Dr. Juan J Howard): right flank and lower quadrant area/abdominal region/flank/leg

## 2020-06-19 ENCOUNTER — RX RENEWAL (OUTPATIENT)
Age: 80
End: 2020-06-19

## 2020-07-29 ENCOUNTER — RX RENEWAL (OUTPATIENT)
Age: 80
End: 2020-07-29

## 2020-08-07 ENCOUNTER — APPOINTMENT (OUTPATIENT)
Dept: GERIATRICS | Facility: CLINIC | Age: 80
End: 2020-08-07
Payer: MEDICARE

## 2020-08-07 VITALS
DIASTOLIC BLOOD PRESSURE: 76 MMHG | HEART RATE: 81 BPM | OXYGEN SATURATION: 94 % | BODY MASS INDEX: 26.77 KG/M2 | WEIGHT: 141.8 LBS | TEMPERATURE: 98 F | SYSTOLIC BLOOD PRESSURE: 118 MMHG | HEIGHT: 61 IN | RESPIRATION RATE: 16 BRPM

## 2020-08-07 DIAGNOSIS — K59.09 OTHER CONSTIPATION: ICD-10-CM

## 2020-08-07 LAB
SARS-COV-2 IGG SERPL IA-ACNC: <0.1 INDEX
SARS-COV-2 IGG SERPL QL IA: NEGATIVE
SARS-COV-2 N GENE NPH QL NAA+PROBE: NOT DETECTED

## 2020-08-07 PROCEDURE — 99214 OFFICE O/P EST MOD 30 MIN: CPT

## 2020-08-07 RX ORDER — ESCITALOPRAM OXALATE 5 MG/1
5 TABLET ORAL
Qty: 30 | Refills: 0 | Status: DISCONTINUED | COMMUNITY
Start: 2020-04-14 | End: 2020-08-07

## 2020-08-07 RX ORDER — GUAIFENESIN 600 MG/1
600 TABLET, EXTENDED RELEASE ORAL
Qty: 14 | Refills: 1 | Status: DISCONTINUED | COMMUNITY
Start: 2020-04-22 | End: 2020-08-07

## 2020-08-07 RX ORDER — MELOXICAM 7.5 MG/1
7.5 TABLET ORAL DAILY
Qty: 180 | Refills: 1 | Status: DISCONTINUED | COMMUNITY
Start: 2017-02-13 | End: 2020-08-07

## 2020-08-25 ENCOUNTER — RX RENEWAL (OUTPATIENT)
Age: 80
End: 2020-08-25

## 2020-08-25 ENCOUNTER — APPOINTMENT (OUTPATIENT)
Dept: OTOLARYNGOLOGY | Facility: CLINIC | Age: 80
End: 2020-08-25
Payer: MEDICARE

## 2020-08-25 VITALS
SYSTOLIC BLOOD PRESSURE: 117 MMHG | BODY MASS INDEX: 26.62 KG/M2 | WEIGHT: 141 LBS | TEMPERATURE: 97.2 F | HEART RATE: 83 BPM | HEIGHT: 61 IN | DIASTOLIC BLOOD PRESSURE: 65 MMHG

## 2020-08-25 DIAGNOSIS — J30.2 OTHER SEASONAL ALLERGIC RHINITIS: ICD-10-CM

## 2020-08-25 DIAGNOSIS — H91.8X1 OTHER SPECIFIED HEARING LOSS, RIGHT EAR: ICD-10-CM

## 2020-08-25 DIAGNOSIS — R42 DIZZINESS AND GIDDINESS: ICD-10-CM

## 2020-08-25 PROCEDURE — 92567 TYMPANOMETRY: CPT

## 2020-08-25 PROCEDURE — 69210 REMOVE IMPACTED EAR WAX UNI: CPT

## 2020-08-25 PROCEDURE — 92557 COMPREHENSIVE HEARING TEST: CPT

## 2020-08-25 PROCEDURE — 31575 DIAGNOSTIC LARYNGOSCOPY: CPT

## 2020-08-25 PROCEDURE — 99204 OFFICE O/P NEW MOD 45 MIN: CPT | Mod: 25

## 2020-08-25 NOTE — ASSESSMENT
[FreeTextEntry1] : With a persistent cough being has a pulmonary evaluation on 6 in September complaining of bit of a postnasal drip and is diminished hearing as well on examination she has cerumen impaction in right ear audiogram which was performed confirm essentially normal presbycusis pattern borderline for hearing hearing aid nasal endoscopy shows a postnasal drip fiberoptic evaluation of her larynx is essentially within normal limits laryngeal exam no tumors masses or polyps I put her on a Medrol Dosepak and Flonase nasal spray if her symptoms do not improve by the time she sees a pulmonologist further work-up will be indicated by the pulmonologist at that time.  Audio also confirms an asymmetrical hearing loss that needs to be followed she certainly would benefit from a hearing aid we will get her set up with an appointment for an evaluation she will return and see us in 4 to 6 weeks for repeat hearing test and possible referral for an MRI to rule out an acoustic neuroma.

## 2020-08-25 NOTE — REVIEW OF SYSTEMS
[Seasonal Allergies] : seasonal allergies [Post Nasal Drip] : post nasal drip [Hearing Loss] : hearing loss [Dizziness] : dizziness [Vertigo] : vertigo [Ear Noises] : ear noises [Wheezing] : wheezing [Nasal Congestion] : nasal congestion [Cough] : cough [Negative] : Heme/Lymph

## 2020-08-25 NOTE — PHYSICAL EXAM
[Midline] : trachea located in midline position [Normal] : no rashes [de-identified] : elongated uvula makes contact with posterior tongue surface

## 2020-08-25 NOTE — HISTORY OF PRESENT ILLNESS
[de-identified] : Patient is suspected of having seasonal allergies as she has a clear postnasal drip with nasal congestion. SHe has had some wheezing in the chest with a dry cough. SHe takes claritin for the allergies daily and has been using an inhaler for her wheezing from the regular doctor. When she remembers to use a nasal spray she tolerates it well. She has an apt with the pulmonologist for next month as a result of the coughing and the wheezing. CHest Xray recently done was clear. 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 SHe does get build up of wax in the ears however. SHe has had some progressive hearing changes in both ears as well. SHe complains on occasion of a rare and intermittent stabbing and ringing in the ears. SHe has had issues with vertigo episodes in the past. The last episode was at the end of last year.

## 2020-08-28 NOTE — HISTORY OF PRESENT ILLNESS
[Moderate] : Stage: Moderate [Worse] : Status: Worse [Memory Lapses Or Loss] : worsened memory impairment [Patient Observed To Be Agitated] : worsened agitation [Sleep Disturbances] : stable sleep disturbances [Hostility Toward Caregivers] : stable aggression [Fixed Beliefs Contradicted By Reality (Delusions)] : worsened delusions [Difficulty Finding Desired Words] : stable difficulty finding desired words [3] : 2) Feeling down, depressed, or hopeless for nearly every day [FreeTextEntry1] : 80 y/o F with Hx of Depression, Gait instability, Memory loss, who is seen for follow up due to persistent cough.  Pt is accompanied by owen Helms who assist with visit and history. \par \par # Cough \par - Pt complains of persistent cough for the past 4 months, associated with post nasal drip.\par - She complains of persistent cough which interferes with her sleep, this is also associated to intermittent episodes of wheezing.\par - No SOB but sometimes difficult to take a deep breath in but this is not new and has had this on/off for long time. \par - Daughter denies any carpets in the house, but have a hypoallergenic dog. \par - Pt was seen in April an has been taking Zyrtec daily, Flonase nasal spray, Budesonide / Formoterol inhaler BID with no improvement.\par - Negative COVID PRC / Antigen on 8/5\par \par # Cognitive impairment: \par - On Donepezil 5mg at HS with associated vivid dreams but currently improved\par - Lives alone with HHA 5 days per week but is most of the time with owen Helms and her /children.\par - Daughter Analia helps with administering medications \par \par # Depression:\par - Pt feels sad, hopeless and is frequently tearful \par - Currently taking Duloxetine 30mg and Lexapro 5mg daily \par - Denies any SI\par \par # Chronic constipation. \par - Pt takes daily Milk of magnesium\par - Last BM 2 days ago \par \par # Charcot-Kassidy-Tooth disease\par - no falls reported since last visit\par - c/w fall precautions \par - PT / OT on hold for now due to COVID pandemic \par

## 2020-08-28 NOTE — PHYSICAL EXAM
[General Appearance - Well Nourished] : well nourished [General Appearance - Well Developed] : well developed [General Appearance - Alert] : alert [Sclera] : the sclera and conjunctiva were normal [General Appearance - Well-Appearing] : healthy appearing [PERRL With Normal Accommodation] : pupils were equal in size, round, and reactive to light [Strabismus] : no strabismus was seen [Extraocular Movements] : extraocular movements were intact [Normal Oral Mucosa] : normal oral mucosa [No Oral Pallor] : no oral pallor [Outer Ear] : the ears and nose were normal in appearance [Both Tympanic Membranes Were Examined] : both tympanic membranes were normal [Neck Appearance] : the appearance of the neck was normal [Jugular Venous Distention Increased] : there was no jugular-venous distention [Neck Cervical Mass (___cm)] : no neck mass was observed [Auscultation Breath Sounds / Voice Sounds] : lungs were clear to auscultation bilaterally [Exaggerated Use Of Accessory Muscles For Inspiration] : no accessory muscle use [Respiration, Rhythm And Depth] : normal respiratory rhythm and effort [Heart Rate And Rhythm] : heart rate was normal and rhythm regular [Apical Impulse] : the apical impulse was normal [Heart Sounds] : normal S1 and S2 [Heart Sounds Gallop] : no gallops [Abdomen Soft] : soft [Arterial Pulses Carotid] : carotid pulses were normal with no bruits [Bowel Sounds] : normal bowel sounds [No CVA Tenderness] : no ~M costovertebral angle tenderness [Abdomen Tenderness] : non-tender [Skin Color & Pigmentation] : normal skin color and pigmentation [No Focal Deficits] : no focal deficits [] : no rash [___/1] : [unfilled]/1   [___/5] : [unfilled]/5 [Dementia] : Dementia [Oriented To Time, Place, And Person] : oriented to person, place, and time [FreeTextEntry1] : C [TextBox_2] : Yes [TextBox_4] : 3rd Grade [TextBox_72] : Limited Test 3/8 [TWNoteComboBox1] : Negative: 3 recalled words

## 2020-08-28 NOTE — REVIEW OF SYSTEMS
[Loss Of Hearing] : hearing loss [Lower Ext Edema] : lower extremity edema [Wheezing] : wheezing [Cough] : cough [Constipation] : constipation [Joint Pain] : joint pain [Confused] : confusion [Fainting] : fainting [Chills] : no chills [Eye Pain] : no eye pain [Feeling Poorly] : not feeling poorly [Feeling Tired] : not feeling tired [Discharge From Eyes] : no purulent discharge from the eyes [Red Eyes] : eyes not red [Eyesight Problems] : no eyesight problems [Nosebleeds] : no nosebleeds [Earache] : no earache [Nasal Discharge] : no nasal discharge [Chest Pain] : no chest pain [Heart Rate Is Slow] : the heart rate was not slow [Heart Rate Is Fast] : the heart rate was not fast [Palpitations] : no palpitations [Shortness Of Breath] : no shortness of breath [SOB on Exertion] : no shortness of breath during exertion [Orthopnea] : no orthopnea [Abdominal Pain] : no abdominal pain [Vomiting] : no vomiting [Diarrhea] : no diarrhea [Dysuria] : no dysuria [Pelvic Pain] : no pelvic pain [Incontinence] : no incontinence [Dysmenorrhea] : no dysmenorrhea [Arthralgias] : no arthralgias [Joint Swelling] : no joint swelling [Skin Lesions] : no skin lesions [Joint Stiffness] : no joint stiffness [Skin Wound] : no skin wound [Itching] : no itching [Convulsions] : no convulsions [Dizziness] : no dizziness

## 2020-09-02 ENCOUNTER — APPOINTMENT (OUTPATIENT)
Dept: PULMONOLOGY | Facility: CLINIC | Age: 80
End: 2020-09-02
Payer: MEDICARE

## 2020-09-02 VITALS — WEIGHT: 142 LBS | TEMPERATURE: 98.3 F | BODY MASS INDEX: 26.81 KG/M2 | HEART RATE: 84 BPM | HEIGHT: 61 IN

## 2020-09-02 PROCEDURE — ZZZZZ: CPT

## 2020-09-02 PROCEDURE — 94729 DIFFUSING CAPACITY: CPT

## 2020-09-02 PROCEDURE — 99204 OFFICE O/P NEW MOD 45 MIN: CPT | Mod: 25

## 2020-09-02 PROCEDURE — 94726 PLETHYSMOGRAPHY LUNG VOLUMES: CPT

## 2020-09-02 PROCEDURE — 94010 BREATHING CAPACITY TEST: CPT

## 2020-09-02 RX ORDER — BUDESONIDE AND FORMOTEROL FUMARATE DIHYDRATE 80; 4.5 UG/1; UG/1
80-4.5 AEROSOL RESPIRATORY (INHALATION) TWICE DAILY
Refills: 0 | Status: DISCONTINUED | COMMUNITY
Start: 2020-04-22 | End: 2020-09-02

## 2020-09-02 RX ORDER — METHYLPREDNISOLONE 4 MG/1
4 TABLET ORAL
Qty: 21 | Refills: 0 | Status: COMPLETED | COMMUNITY
Start: 2020-08-25 | End: 2020-09-02

## 2020-09-02 NOTE — CONSULT LETTER
[Dear  ___] : Dear  [unfilled], [( Thank you for referring [unfilled] for consultation for _____ )] : Thank you for referring [unfilled] for consultation for [unfilled] [Consult Letter:] : I had the pleasure of evaluating your patient, [unfilled]. [Sincerely,] : Sincerely, [Please see my note below.] : Please see my note below. [Consult Closing:] : Thank you very much for allowing me to participate in the care of this patient.  If you have any questions, please do not hesitate to contact me. [FreeTextEntry2] : Dr. Carmen Amos [FreeTextEntry3] : Kim Mcdonough MD\par  \par Division of Pulmonary, Critical Care & Sleep Medicine\par [facility]\par \par \par

## 2020-09-02 NOTE — PHYSICAL EXAM
[Well Groomed] : well groomed [No Acute Distress] : no acute distress [II] : Mallampati Class: II [Normal Oropharynx] : normal oropharynx [No Deformities] : no deformities [No Neck Mass] : no neck mass [Normal Rate/Rhythm] : normal rate/rhythm [Normal Appearance] : normal appearance [Normal S1, S2] : normal s1, s2 [Normal Pulses] : normal pulses [No Resp Distress] : no resp distress [No Murmurs] : no murmurs [No Acc Muscle Use] : no acc muscle use [Clear to Auscultation Bilaterally] : clear to auscultation bilaterally [Normal Rhythm and Effort] : normal rhythm and effort [No Abnormalities] : no abnormalities [Benign] : benign [No Clubbing] : no clubbing [FROM] : FROM [No Cyanosis] : no cyanosis [Normal Color/ Pigmentation] : normal color/ pigmentation [Cranial Nerves Intact] : cranial nerves intact [Normal Mood] : normal mood [Recent Memory Normal] : recent memory normal [Oriented x3] : oriented x3 [Normal Insight/judgment] : normal insight/judgment [TextBox_80] : mild kyphosis [Normal Affect] : normal affect [TextBox_99] : ambulates with left leg brace [TextBox_132] : left leg weakness [TextBox_105] : +DIP and PIP joint changes bilaterally

## 2020-09-02 NOTE — REVIEW OF SYSTEMS
[EDS] : no EDS [Dry Eyes] : no dry eyes [Ear Disturbance] : ear disturbance [Epistaxis] : no epistaxis [Sore Throat] : no sore throat [Eye Irritation] : no eye irritation [Postnasal Drip] : postnasal drip [Nasal Congestion] : nasal congestion [Sinus Problems] : no sinus problems [Poor Dentition] : no poor dentition [Dry Mouth] : no dry mouth [Edentulous] : no edentulous [Hemoptysis] : no hemoptysis [Cough] : cough [Frequent URIs] : no frequent URIs [Chest Tightness] : no chest tightness [Pleuritic Pain] : no pleuritic pain [Dyspnea] : no dyspnea [Sputum] : no sputum [A.M. Dry Mouth] : no a.m. dry mouth [SOB on Exertion] : no sob on exertion [Wheezing] : wheezing [Hay Fever] : hay fever [Itchy Eyes] : no itchy eyes [Watery Eyes] : no watery eyes [Hives] : no hives [Nasal Discharge] : nasal discharge [Seasonal Allergies] : seasonal allergies [GERD] : gerd [Immunocompromised] : not immunocompromised [Angioedema] : no angioedema [Abdominal Pain] : no abdominal pain [Nausea] : no nausea [Constipation] : constipation [Vomiting] : no vomiting [Diarrhea] : no diarrhea [Food Intolerance] : no food intolerance [Arthralgias] : arthralgias [Hepatic Disease] : no hepatic disease [Trauma/ Injury] : no trauma/ injury [Chronic Pain] : chronic pain [Back Pain] : back pain [Depression] : depression [Anxiety] : no anxiety [Negative] : Dermatologic [TextBox_94] : l [TextBox_14] : per HPI [TextBox_69] : ? [TextBox_122] : left foot weakness- uses brace

## 2020-09-02 NOTE — ASSESSMENT
[FreeTextEntry1] : 79 yo F presents for initial evaluation of chronic cough. Referred by  and is accompanied by her two daughters Fátima and Analia. Lifelong nonsmoker, significant second hand smoke. Patient has a history of memory loss, depression, Charcot-pratik tooth disease, provoked PE in 2014 s/p 6 months of Eliquis, seasonal/environmental allergies with chronic nasal congestion, GERD who presents with a chronic nonproductive cough since March 2020. Associated with occasional wheeze - cough can occur at any time but more prominent when eating (especially liquids) and associated with audible wheezing. Denies dyspnea, denies chest pain or sputum production, weight loss. Was started on Symbicort and Claritin without improvement. \par Evaluated recently by Dr. Moreno- laryngoscopy showed e/o postnasal drip and she was started on Medrol Dosepack which she completed yesterday and Flonase, Zyrtec. Noted slight improvement with Medrol.\par +Acid reflux - in the past was on PPI, with improvement, now with recurrence of mild symptoms.  \par Denies cardiac history, no recent Echocardiography. \par Was tested for COVID 8/7/2020: negative nasal PCR and Igg antibody.\par Recent CXR performed at home was negative per report - do not have access to images.\par PFTs today: normal spirometry and lung volumes, mildly reduced DLCO 65\par \par A/P: Chronic cough - multifactorial, secondary to postnasal drip, GERD, dysphagia and/or pulmonary pathology such as ILD. Concern that her DLCO is mildly reduced. No obstructive or restrictive pattern present on PFTs\par Rec \par CT chest noncontrast to  better evaluated lung parenchyma\par D/c Symbicort\par c/w Zyrtec and Flonase for upper airway cough syndrome\par Start PPI - Rx sent for Nexium to be trialed for the next month and monitor symptoms\par Referral to SLP for dysphagia eval\par Will follow up in 1 month. \par Above discussed at length with the patient and her two daughters, all questions answered. \par

## 2020-09-02 NOTE — HISTORY OF PRESENT ILLNESS
[Never] : never [Class I - No Symptoms and No Limitations] : I [0  -  Nothing at all] : 0, nothing at all [TextBox_4] : 79 yo F presents for initial evaluation of chronic cough. Referred by  and is accompanied by her two daughters Fátima and Analia. Lifelong nonsmoker, significant second hand smoke. Patient has a history of memory loss, depression, Charcot-pratik tooth disease, provoked PE in 2014 s/p 6 months of Eliquis, seasonal/environmental allergies with chronic nasal congestion, GERD who presents with a chronic nonproductive cough since March 2020. Associated with occasional wheeze - cough can occur at any time but more prominent when eating (especially liquids) and associated with audible wheezing. Denies dyspnea, denies chest pain or sputum production, weight loss. Was started on Symbicort and Claritin without improvement. \par Evaluated recently by Dr. Moreno- laryngoscopy showed e/o postnasal drip and she was started on Medrol Dosepack which she completed yesterday and Flonase, Zyrtec. Noted slight improvement with Medrol.\par +Acid reflux - in the past was on PPI, with improvement, now with recurrence of mild symptoms.  \par Denies cardiac history, no recent Echocardiography. \par Was tested for COVID 8/7/2020: negative nasal PCR and Igg antibody.\par Recent CXR performed at home was negative per report - do not have access to images.\par PFTs today: normal spirometry and lung volumes, mildly reduced DLCO 65\par

## 2020-09-04 ENCOUNTER — APPOINTMENT (OUTPATIENT)
Dept: GERIATRICS | Facility: CLINIC | Age: 80
End: 2020-09-04
Payer: MEDICARE

## 2020-09-04 VITALS
OXYGEN SATURATION: 94 % | BODY MASS INDEX: 27.38 KG/M2 | HEIGHT: 61 IN | HEART RATE: 79 BPM | SYSTOLIC BLOOD PRESSURE: 110 MMHG | RESPIRATION RATE: 14 BRPM | DIASTOLIC BLOOD PRESSURE: 70 MMHG | WEIGHT: 145 LBS | TEMPERATURE: 98.3 F

## 2020-09-04 DIAGNOSIS — R26.89 OTHER ABNORMALITIES OF GAIT AND MOBILITY: ICD-10-CM

## 2020-09-04 PROCEDURE — 99214 OFFICE O/P EST MOD 30 MIN: CPT

## 2020-09-04 NOTE — PHYSICAL EXAM
[General Appearance - Well Nourished] : well nourished [General Appearance - Alert] : alert [General Appearance - Well Developed] : well developed [Sclera] : the sclera and conjunctiva were normal [General Appearance - Well-Appearing] : healthy appearing [Strabismus] : no strabismus was seen [Extraocular Movements] : extraocular movements were intact [PERRL With Normal Accommodation] : pupils were equal in size, round, and reactive to light [Normal Oral Mucosa] : normal oral mucosa [Outer Ear] : the ears and nose were normal in appearance [No Oral Pallor] : no oral pallor [Both Tympanic Membranes Were Examined] : both tympanic membranes were normal [Neck Appearance] : the appearance of the neck was normal [Neck Cervical Mass (___cm)] : no neck mass was observed [Jugular Venous Distention Increased] : there was no jugular-venous distention [Respiration, Rhythm And Depth] : normal respiratory rhythm and effort [Exaggerated Use Of Accessory Muscles For Inspiration] : no accessory muscle use [Auscultation Breath Sounds / Voice Sounds] : lungs were clear to auscultation bilaterally [Apical Impulse] : the apical impulse was normal [Heart Rate And Rhythm] : heart rate was normal and rhythm regular [Heart Sounds Gallop] : no gallops [Heart Sounds] : normal S1 and S2 [Bowel Sounds] : normal bowel sounds [Arterial Pulses Carotid] : carotid pulses were normal with no bruits [Abdomen Soft] : soft [Abdomen Tenderness] : non-tender [No CVA Tenderness] : no ~M costovertebral angle tenderness [Skin Color & Pigmentation] : normal skin color and pigmentation [] : no rash [No Focal Deficits] : no focal deficits [___/1] : [unfilled]/1   [Dementia] : Dementia [___/5] : [unfilled]/5 [Oriented To Time, Place, And Person] : oriented to person, place, and time [General Appearance - In No Acute Distress] : in no acute distress [TextBox_2] : Yes [FreeTextEntry1] : LE edema 2+  [TextBox_72] : Limited Test 3/8 [TextBox_4] : 3rd Grade [TWNoteComboBox1] : Negative: 3 recalled words

## 2020-09-04 NOTE — REVIEW OF SYSTEMS
[Loss Of Hearing] : hearing loss [Lower Ext Edema] : lower extremity edema [Wheezing] : wheezing [Cough] : cough [Constipation] : constipation [Joint Pain] : joint pain [Confused] : confusion [Chills] : no chills [Feeling Poorly] : not feeling poorly [Feeling Tired] : not feeling tired [Eyesight Problems] : no eyesight problems [Red Eyes] : eyes not red [Eye Pain] : no eye pain [Earache] : no earache [Discharge From Eyes] : no purulent discharge from the eyes [Nosebleeds] : no nosebleeds [Heart Rate Is Slow] : the heart rate was not slow [Nasal Discharge] : no nasal discharge [Heart Rate Is Fast] : the heart rate was not fast [Chest Pain] : no chest pain [Palpitations] : no palpitations [SOB on Exertion] : no shortness of breath during exertion [Shortness Of Breath] : no shortness of breath [Orthopnea] : no orthopnea [Abdominal Pain] : no abdominal pain [Diarrhea] : no diarrhea [Vomiting] : no vomiting [Incontinence] : no incontinence [Dysuria] : no dysuria [Pelvic Pain] : no pelvic pain [Arthralgias] : no arthralgias [Dysmenorrhea] : no dysmenorrhea [Joint Swelling] : no joint swelling [Joint Stiffness] : no joint stiffness [Skin Lesions] : no skin lesions [Skin Wound] : no skin wound [Itching] : no itching [Convulsions] : no convulsions [Dizziness] : no dizziness [Fainting] : no fainting

## 2020-09-04 NOTE — REASON FOR VISIT
[Follow-Up] : a follow-up visit [Family Member] : family member [Intercurrent Specialty/Sub-specialty Visits] : the patient has intercurrent specialty/sub-specialty visits [FreeTextEntry1] : Follow up for persistent cough, depression and memory problems [FreeTextEntry3] : ENT / Pulmonology

## 2020-09-04 NOTE — ASSESSMENT
[Social Work Referral] : Social Work referral [Regular activities] : regular physical, social and mental activities [Daily physical exercise as tolerated] : Daily physical exercise as tolerated [FreeTextEntry1] : - f/u in 1 month for influenza vaccine during visit

## 2020-09-04 NOTE — HISTORY OF PRESENT ILLNESS
[Moderate] : Stage: Moderate [Worse] : Status: Worse [Memory Lapses Or Loss] : worsened memory impairment [Patient Observed To Be Agitated] : worsened agitation [Hostility Toward Caregivers] : stable aggression [Sleep Disturbances] : stable sleep disturbances [Fixed Beliefs Contradicted By Reality (Delusions)] : worsened delusions [Difficulty Finding Desired Words] : stable difficulty finding desired words [0] : 2) Feeling down, depressed, or hopeless: Not at all [FreeTextEntry1] : 80 y/o F with Hx of Depression, Gait instability, Memory loss, who is seen for follow up due to persistent cough.  Pt is accompanied by dtr Fátima who assist with visit and history. \par \par # Cough \par - Pt complains of persistent cough for the past, associated with post nasal drip.\par - She complains of persistent cough which interferes with her sleep, this is also associated to intermittent episodes of wheezing.\par - Saw Pulmonology Dr. Mcdonough, appreciate input cough is likely multifactorial, secondary to postnasal drip, GERD, dysphagia and/or pulmonary pathology such as ILD. Concern that her DLCO is mildly reduced. No obstructive or restrictive pattern present on PFTs. \par - Continues to takeZyrtec daily and Flonase\par - Pending CT chest, Echo and SLP for speech and swallow eval \par \par # Cognitive impairment: \par - On Donepezil 5mg at HS with associated vivid dreams but currently improved\par - Lives alone with daughter Analia and has HHA 5 days for 4 hrs, but is most of the time with dtr Analia and her /children.\par \par # Depression:\par - Improved since Lexapro dose was increased \par - Currently taking Duloxetine 30mg and Lexapro 10mg daily \par \par # Frequent urination during the night\par - Daughter Fátima is concerned about frequent urination during the night and pts risk of falls \par - She brought a bedside commode but pt refused to use it and uses a container instead\par - Pt denies any burning during urination or discomfort. \par - Discussed with daughter with assistance of SW techniques than can be used at home including possible increase supervision during the night \par \par # Chronic constipation. 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

## 2020-09-15 ENCOUNTER — RX RENEWAL (OUTPATIENT)
Age: 80
End: 2020-09-15

## 2020-09-15 ENCOUNTER — APPOINTMENT (OUTPATIENT)
Dept: GERIATRICS | Facility: CLINIC | Age: 80
End: 2020-09-15

## 2020-09-15 PROCEDURE — 99447 NTRPROF PH1/NTRNET/EHR 11-20: CPT

## 2020-09-16 ENCOUNTER — APPOINTMENT (OUTPATIENT)
Dept: GERIATRICS | Facility: CLINIC | Age: 80
End: 2020-09-16

## 2020-09-24 ENCOUNTER — APPOINTMENT (OUTPATIENT)
Dept: CT IMAGING | Facility: CLINIC | Age: 80
End: 2020-09-24
Payer: MEDICARE

## 2020-09-24 ENCOUNTER — OUTPATIENT (OUTPATIENT)
Dept: OUTPATIENT SERVICES | Facility: HOSPITAL | Age: 80
LOS: 1 days | End: 2020-09-24
Payer: MEDICARE

## 2020-09-24 DIAGNOSIS — G60.0 HEREDITARY MOTOR AND SENSORY NEUROPATHY: ICD-10-CM

## 2020-09-24 PROCEDURE — 71250 CT THORAX DX C-: CPT | Mod: 26

## 2020-09-24 PROCEDURE — 71250 CT THORAX DX C-: CPT

## 2020-10-06 ENCOUNTER — APPOINTMENT (OUTPATIENT)
Dept: CV DIAGNOSITCS | Facility: HOSPITAL | Age: 80
End: 2020-10-06

## 2020-10-14 ENCOUNTER — APPOINTMENT (OUTPATIENT)
Dept: CARDIOLOGY | Facility: CLINIC | Age: 80
End: 2020-10-14
Payer: MEDICARE

## 2020-10-14 VITALS
BODY MASS INDEX: 24.73 KG/M2 | HEIGHT: 61 IN | OXYGEN SATURATION: 97 % | WEIGHT: 131 LBS | SYSTOLIC BLOOD PRESSURE: 132 MMHG | DIASTOLIC BLOOD PRESSURE: 85 MMHG | HEART RATE: 79 BPM

## 2020-10-14 DIAGNOSIS — R06.2 WHEEZING: ICD-10-CM

## 2020-10-14 DIAGNOSIS — G60.0 HEREDITARY MOTOR AND SENSORY NEUROPATHY: ICD-10-CM

## 2020-10-14 DIAGNOSIS — Z86.39 PERSONAL HISTORY OF OTHER ENDOCRINE, NUTRITIONAL AND METABOLIC DISEASE: ICD-10-CM

## 2020-10-14 DIAGNOSIS — M79.89 OTHER SPECIFIED SOFT TISSUE DISORDERS: ICD-10-CM

## 2020-10-14 PROCEDURE — 93000 ELECTROCARDIOGRAM COMPLETE: CPT

## 2020-10-14 PROCEDURE — 99205 OFFICE O/P NEW HI 60 MIN: CPT

## 2020-10-14 RX ORDER — FLUTICASONE PROPIONATE 50 UG/1
50 SPRAY, METERED NASAL DAILY
Qty: 48 | Refills: 3 | Status: DISCONTINUED | COMMUNITY
Start: 2020-08-25 | End: 2020-10-14

## 2020-10-14 RX ORDER — GUAIFENESIN DEXTROMETHORPHAN HYDROBROMIDE ORAL SOLUTION 200; 20 MG/10ML; MG/10ML
20-200 SOLUTION ORAL EVERY 4 HOURS
Qty: 1 | Refills: 0 | Status: DISCONTINUED | COMMUNITY
Start: 2020-08-07 | End: 2020-10-14

## 2020-10-14 RX ORDER — PANTOPRAZOLE 40 MG/1
40 TABLET, DELAYED RELEASE ORAL DAILY
Qty: 30 | Refills: 0 | Status: DISCONTINUED | COMMUNITY
Start: 2020-09-04 | End: 2020-10-14

## 2020-10-14 NOTE — REVIEW OF SYSTEMS
[see HPI] : see HPI [Lower Ext Edema] : lower extremity edema [Wheezing] : wheezing [Negative] : Heme/Lymph

## 2020-10-14 NOTE — PHYSICAL EXAM
[Well Groomed] : well groomed [General Appearance - Well Developed] : well developed [Normal Appearance] : normal appearance [General Appearance - Well Nourished] : well nourished [General Appearance - In No Acute Distress] : no acute distress [No Deformities] : no deformities [Normal Conjunctiva] : the conjunctiva exhibited no abnormalities [Eyelids - No Xanthelasma] : the eyelids demonstrated no xanthelasmas [Normal Oral Mucosa] : normal oral mucosa [No Oral Pallor] : no oral pallor [No Oral Cyanosis] : no oral cyanosis [Normal Jugular Venous A Waves Present] : normal jugular venous A waves present [Normal Jugular Venous V Waves Present] : normal jugular venous V waves present [No Jugular Venous Berry A Waves] : no jugular venous berry A waves [Normal] : normal [Normal Rate] : normal [Rhythm Regular] : regular [Normal S1] : normal S1 [Normal S2] : normal S2 [Respiration, Rhythm And Depth] : normal respiratory rhythm and effort [Exaggerated Use Of Accessory Muscles For Inspiration] : no accessory muscle use [Auscultation Breath Sounds / Voice Sounds] : lungs were clear to auscultation bilaterally [Abdomen Soft] : soft [Abdomen Tenderness] : non-tender [Abdomen Mass (___ Cm)] : no abdominal mass palpated [Abnormal Walk] : normal gait [Gait - Sufficient For Exercise Testing] : the gait was sufficient for exercise testing [Nail Clubbing] : no clubbing of the fingernails [Cyanosis, Localized] : no localized cyanosis [Petechial Hemorrhages (___cm)] : no petechial hemorrhages [] : no rash [Skin Color & Pigmentation] : normal skin color and pigmentation [Skin Lesions] : no skin lesions [No Venous Stasis] : no venous stasis [No Skin Ulcers] : no skin ulcer [No Xanthoma] : no  xanthoma was observed [Oriented To Time, Place, And Person] : oriented to person, place, and time [Mood] : the mood was normal [Affect] : the affect was normal [No Anxiety] : not feeling anxious

## 2020-10-20 ENCOUNTER — APPOINTMENT (OUTPATIENT)
Dept: CARDIOLOGY | Facility: CLINIC | Age: 80
End: 2020-10-20

## 2020-10-22 ENCOUNTER — APPOINTMENT (OUTPATIENT)
Dept: CARDIOLOGY | Facility: CLINIC | Age: 80
End: 2020-10-22
Payer: MEDICARE

## 2020-10-22 PROCEDURE — 93306 TTE W/DOPPLER COMPLETE: CPT

## 2020-10-23 LAB
ALBUMIN SERPL ELPH-MCNC: 4.1 G/DL
ALP BLD-CCNC: 111 U/L
ALT SERPL-CCNC: 20 U/L
ANION GAP SERPL CALC-SCNC: 9 MMOL/L
AST SERPL-CCNC: 31 U/L
BASOPHILS # BLD AUTO: 0.03 K/UL
BASOPHILS NFR BLD AUTO: 0.7 %
BILIRUB SERPL-MCNC: 0.5 MG/DL
BUN SERPL-MCNC: 15 MG/DL
CALCIUM SERPL-MCNC: 9.2 MG/DL
CHLORIDE SERPL-SCNC: 105 MMOL/L
CHOLEST SERPL-MCNC: 226 MG/DL
CO2 SERPL-SCNC: 28 MMOL/L
CREAT SERPL-MCNC: 0.72 MG/DL
EOSINOPHIL # BLD AUTO: 0.12 K/UL
EOSINOPHIL NFR BLD AUTO: 2.7 %
GLUCOSE SERPL-MCNC: 102 MG/DL
HCT VFR BLD CALC: 44.6 %
HDLC SERPL-MCNC: 76 MG/DL
HGB BLD-MCNC: 14.3 G/DL
IMM GRANULOCYTES NFR BLD AUTO: 0 %
LDLC SERPL CALC-MCNC: 127 MG/DL
LYMPHOCYTES # BLD AUTO: 1.93 K/UL
LYMPHOCYTES NFR BLD AUTO: 43.7 %
MAN DIFF?: NORMAL
MCHC RBC-ENTMCNC: 31.4 PG
MCHC RBC-ENTMCNC: 32.1 GM/DL
MCV RBC AUTO: 97.8 FL
MONOCYTES # BLD AUTO: 0.38 K/UL
MONOCYTES NFR BLD AUTO: 8.6 %
NEUTROPHILS # BLD AUTO: 1.96 K/UL
NEUTROPHILS NFR BLD AUTO: 44.3 %
NONHDLC SERPL-MCNC: 151 MG/DL
PLATELET # BLD AUTO: 191 K/UL
POTASSIUM SERPL-SCNC: 4.7 MMOL/L
PROT SERPL-MCNC: 6.3 G/DL
RBC # BLD: 4.56 M/UL
RBC # FLD: 13.3 %
SODIUM SERPL-SCNC: 142 MMOL/L
TRIGL SERPL-MCNC: 121 MG/DL
TSH SERPL-ACNC: 3.12 UIU/ML
WBC # FLD AUTO: 4.42 K/UL

## 2020-11-13 ENCOUNTER — OUTPATIENT (OUTPATIENT)
Dept: OUTPATIENT SERVICES | Facility: HOSPITAL | Age: 80
LOS: 1 days | End: 2020-11-13
Payer: MEDICARE

## 2020-11-13 ENCOUNTER — APPOINTMENT (OUTPATIENT)
Dept: ULTRASOUND IMAGING | Facility: IMAGING CENTER | Age: 80
End: 2020-11-13

## 2020-11-13 DIAGNOSIS — R60.0 LOCALIZED EDEMA: ICD-10-CM

## 2020-11-13 PROCEDURE — 93970 EXTREMITY STUDY: CPT

## 2020-11-13 PROCEDURE — 93970 EXTREMITY STUDY: CPT | Mod: 26

## 2020-11-16 ENCOUNTER — APPOINTMENT (OUTPATIENT)
Dept: SPEECH THERAPY | Facility: CLINIC | Age: 80
End: 2020-11-16
Payer: MEDICARE

## 2020-11-16 PROCEDURE — 92610 EVALUATE SWALLOWING FUNCTION: CPT | Mod: GN

## 2020-11-22 ENCOUNTER — TRANSCRIPTION ENCOUNTER (OUTPATIENT)
Age: 80
End: 2020-11-22

## 2020-11-22 ENCOUNTER — INPATIENT (INPATIENT)
Facility: HOSPITAL | Age: 80
LOS: 2 days | Discharge: HOME | DRG: 512 | End: 2020-11-25
Attending: ORTHOPAEDIC SURGERY | Admitting: ORTHOPAEDIC SURGERY
Payer: MEDICARE

## 2020-11-22 VITALS
RESPIRATION RATE: 20 BRPM | OXYGEN SATURATION: 98 % | WEIGHT: 151.9 LBS | TEMPERATURE: 98 F | DIASTOLIC BLOOD PRESSURE: 84 MMHG | HEIGHT: 63 IN | SYSTOLIC BLOOD PRESSURE: 156 MMHG | HEART RATE: 80 BPM

## 2020-11-22 DIAGNOSIS — M67.90 UNSPECIFIED DISORDER OF SYNOVIUM AND TENDON, UNSPECIFIED SITE: Chronic | ICD-10-CM

## 2020-11-22 DIAGNOSIS — S52.571B: ICD-10-CM

## 2020-11-22 LAB
ALBUMIN SERPL ELPH-MCNC: 4.5 G/DL — SIGNIFICANT CHANGE UP (ref 3.3–5)
ALP SERPL-CCNC: 104 U/L — SIGNIFICANT CHANGE UP (ref 40–120)
ALT FLD-CCNC: 30 U/L — SIGNIFICANT CHANGE UP (ref 10–45)
ANION GAP SERPL CALC-SCNC: 14 MMOL/L — SIGNIFICANT CHANGE UP (ref 5–17)
APTT BLD: 22.6 SEC — LOW (ref 27.5–35.5)
AST SERPL-CCNC: 75 U/L — HIGH (ref 10–40)
BASOPHILS # BLD AUTO: 0.03 K/UL — SIGNIFICANT CHANGE UP (ref 0–0.2)
BASOPHILS NFR BLD AUTO: 0.4 % — SIGNIFICANT CHANGE UP (ref 0–2)
BILIRUB SERPL-MCNC: 0.5 MG/DL — SIGNIFICANT CHANGE UP (ref 0.2–1.2)
BLD GP AB SCN SERPL QL: NEGATIVE — SIGNIFICANT CHANGE UP
BUN SERPL-MCNC: 14 MG/DL — SIGNIFICANT CHANGE UP (ref 7–23)
CALCIUM SERPL-MCNC: 9.7 MG/DL — SIGNIFICANT CHANGE UP (ref 8.4–10.5)
CHLORIDE SERPL-SCNC: 101 MMOL/L — SIGNIFICANT CHANGE UP (ref 96–108)
CO2 SERPL-SCNC: 23 MMOL/L — SIGNIFICANT CHANGE UP (ref 22–31)
CREAT SERPL-MCNC: 0.56 MG/DL — SIGNIFICANT CHANGE UP (ref 0.5–1.3)
EOSINOPHIL # BLD AUTO: 0.01 K/UL — SIGNIFICANT CHANGE UP (ref 0–0.5)
EOSINOPHIL NFR BLD AUTO: 0.1 % — SIGNIFICANT CHANGE UP (ref 0–6)
GLUCOSE SERPL-MCNC: 108 MG/DL — HIGH (ref 70–99)
HCT VFR BLD CALC: 42.5 % — SIGNIFICANT CHANGE UP (ref 34.5–45)
HGB BLD-MCNC: 14.2 G/DL — SIGNIFICANT CHANGE UP (ref 11.5–15.5)
IMM GRANULOCYTES NFR BLD AUTO: 0.4 % — SIGNIFICANT CHANGE UP (ref 0–1.5)
INR BLD: 1.01 RATIO — SIGNIFICANT CHANGE UP (ref 0.88–1.16)
LYMPHOCYTES # BLD AUTO: 1.34 K/UL — SIGNIFICANT CHANGE UP (ref 1–3.3)
LYMPHOCYTES # BLD AUTO: 16.5 % — SIGNIFICANT CHANGE UP (ref 13–44)
MCHC RBC-ENTMCNC: 31.1 PG — SIGNIFICANT CHANGE UP (ref 27–34)
MCHC RBC-ENTMCNC: 33.4 GM/DL — SIGNIFICANT CHANGE UP (ref 32–36)
MCV RBC AUTO: 93.2 FL — SIGNIFICANT CHANGE UP (ref 80–100)
MONOCYTES # BLD AUTO: 0.42 K/UL — SIGNIFICANT CHANGE UP (ref 0–0.9)
MONOCYTES NFR BLD AUTO: 5.2 % — SIGNIFICANT CHANGE UP (ref 2–14)
NEUTROPHILS # BLD AUTO: 6.27 K/UL — SIGNIFICANT CHANGE UP (ref 1.8–7.4)
NEUTROPHILS NFR BLD AUTO: 77.4 % — HIGH (ref 43–77)
NRBC # BLD: 0 /100 WBCS — SIGNIFICANT CHANGE UP (ref 0–0)
PLATELET # BLD AUTO: 182 K/UL — SIGNIFICANT CHANGE UP (ref 150–400)
POTASSIUM SERPL-MCNC: 5.1 MMOL/L — SIGNIFICANT CHANGE UP (ref 3.5–5.3)
POTASSIUM SERPL-SCNC: 5.1 MMOL/L — SIGNIFICANT CHANGE UP (ref 3.5–5.3)
PROT SERPL-MCNC: 7.4 G/DL — SIGNIFICANT CHANGE UP (ref 6–8.3)
PROTHROM AB SERPL-ACNC: 12.1 SEC — SIGNIFICANT CHANGE UP (ref 10.6–13.6)
RBC # BLD: 4.56 M/UL — SIGNIFICANT CHANGE UP (ref 3.8–5.2)
RBC # FLD: 12.5 % — SIGNIFICANT CHANGE UP (ref 10.3–14.5)
RH IG SCN BLD-IMP: POSITIVE — SIGNIFICANT CHANGE UP
RH IG SCN BLD-IMP: POSITIVE — SIGNIFICANT CHANGE UP
SODIUM SERPL-SCNC: 138 MMOL/L — SIGNIFICANT CHANGE UP (ref 135–145)
WBC # BLD: 8.1 K/UL — SIGNIFICANT CHANGE UP (ref 3.8–10.5)
WBC # FLD AUTO: 8.1 K/UL — SIGNIFICANT CHANGE UP (ref 3.8–10.5)

## 2020-11-22 PROCEDURE — 93010 ELECTROCARDIOGRAM REPORT: CPT | Mod: GC

## 2020-11-22 PROCEDURE — 73110 X-RAY EXAM OF WRIST: CPT | Mod: 26,RT

## 2020-11-22 PROCEDURE — 99285 EMERGENCY DEPT VISIT HI MDM: CPT | Mod: CS,25,GC

## 2020-11-22 PROCEDURE — 73090 X-RAY EXAM OF FOREARM: CPT | Mod: 26

## 2020-11-22 PROCEDURE — 71045 X-RAY EXAM CHEST 1 VIEW: CPT | Mod: 26

## 2020-11-22 RX ORDER — ESCITALOPRAM OXALATE 10 MG/1
10 TABLET, FILM COATED ORAL DAILY
Refills: 0 | Status: DISCONTINUED | OUTPATIENT
Start: 2020-11-22 | End: 2020-11-23

## 2020-11-22 RX ORDER — PANTOPRAZOLE SODIUM 20 MG/1
40 TABLET, DELAYED RELEASE ORAL
Refills: 0 | Status: DISCONTINUED | OUTPATIENT
Start: 2020-11-22 | End: 2020-11-23

## 2020-11-22 RX ORDER — SODIUM CHLORIDE 9 MG/ML
1000 INJECTION INTRAMUSCULAR; INTRAVENOUS; SUBCUTANEOUS
Refills: 0 | Status: DISCONTINUED | OUTPATIENT
Start: 2020-11-22 | End: 2020-11-23

## 2020-11-22 RX ORDER — TETANUS TOXOID, REDUCED DIPHTHERIA TOXOID AND ACELLULAR PERTUSSIS VACCINE, ADSORBED 5; 2.5; 8; 8; 2.5 [IU]/.5ML; [IU]/.5ML; UG/.5ML; UG/.5ML; UG/.5ML
0.5 SUSPENSION INTRAMUSCULAR ONCE
Refills: 0 | Status: COMPLETED | OUTPATIENT
Start: 2020-11-22 | End: 2020-11-22

## 2020-11-22 RX ORDER — ACETAMINOPHEN 500 MG
975 TABLET ORAL ONCE
Refills: 0 | Status: COMPLETED | OUTPATIENT
Start: 2020-11-22 | End: 2020-11-22

## 2020-11-22 RX ORDER — OXYCODONE HYDROCHLORIDE 5 MG/1
5 TABLET ORAL EVERY 4 HOURS
Refills: 0 | Status: DISCONTINUED | OUTPATIENT
Start: 2020-11-22 | End: 2020-11-23

## 2020-11-22 RX ORDER — OXYCODONE HYDROCHLORIDE 5 MG/1
2.5 TABLET ORAL EVERY 4 HOURS
Refills: 0 | Status: DISCONTINUED | OUTPATIENT
Start: 2020-11-22 | End: 2020-11-23

## 2020-11-22 RX ORDER — LORATADINE 10 MG/1
10 TABLET ORAL DAILY
Refills: 0 | Status: DISCONTINUED | OUTPATIENT
Start: 2020-11-22 | End: 2020-11-23

## 2020-11-22 RX ORDER — SENNA PLUS 8.6 MG/1
2 TABLET ORAL AT BEDTIME
Refills: 0 | Status: DISCONTINUED | OUTPATIENT
Start: 2020-11-22 | End: 2020-11-23

## 2020-11-22 RX ORDER — FLUTICASONE PROPIONATE 50 MCG
1 SPRAY, SUSPENSION NASAL ONCE
Refills: 0 | Status: DISCONTINUED | OUTPATIENT
Start: 2020-11-22 | End: 2020-11-23

## 2020-11-22 RX ORDER — DONEPEZIL HYDROCHLORIDE 10 MG/1
5 TABLET, FILM COATED ORAL AT BEDTIME
Refills: 0 | Status: DISCONTINUED | OUTPATIENT
Start: 2020-11-22 | End: 2020-11-23

## 2020-11-22 RX ORDER — FENTANYL CITRATE 50 UG/ML
25 INJECTION INTRAVENOUS ONCE
Refills: 0 | Status: DISCONTINUED | OUTPATIENT
Start: 2020-11-22 | End: 2020-11-22

## 2020-11-22 RX ORDER — ACETAMINOPHEN 500 MG
975 TABLET ORAL EVERY 8 HOURS
Refills: 0 | Status: DISCONTINUED | OUTPATIENT
Start: 2020-11-22 | End: 2020-11-23

## 2020-11-22 RX ORDER — CEFAZOLIN SODIUM 1 G
2000 VIAL (EA) INJECTION EVERY 8 HOURS
Refills: 0 | Status: DISCONTINUED | OUTPATIENT
Start: 2020-11-23 | End: 2020-11-23

## 2020-11-22 RX ORDER — CEFAZOLIN SODIUM 1 G
1000 VIAL (EA) INJECTION ONCE
Refills: 0 | Status: COMPLETED | OUTPATIENT
Start: 2020-11-22 | End: 2020-11-22

## 2020-11-22 RX ORDER — DULOXETINE HYDROCHLORIDE 30 MG/1
30 CAPSULE, DELAYED RELEASE ORAL DAILY
Refills: 0 | Status: DISCONTINUED | OUTPATIENT
Start: 2020-11-22 | End: 2020-11-23

## 2020-11-22 RX ADMIN — TETANUS TOXOID, REDUCED DIPHTHERIA TOXOID AND ACELLULAR PERTUSSIS VACCINE, ADSORBED 0.5 MILLILITER(S): 5; 2.5; 8; 8; 2.5 SUSPENSION INTRAMUSCULAR at 17:17

## 2020-11-22 RX ADMIN — FENTANYL CITRATE 25 MICROGRAM(S): 50 INJECTION INTRAVENOUS at 19:40

## 2020-11-22 RX ADMIN — Medication 100 MILLIGRAM(S): at 19:42

## 2020-11-22 RX ADMIN — Medication 975 MILLIGRAM(S): at 17:17

## 2020-11-22 NOTE — H&P ADULT - ASSESSMENT
A/P: 79 yo F s/p closed reduction and splinting of Grade I [place] open right distal radius fracture    - Admit to ortho under Dr. Smalls  - Pain control  - Splint precautions:  Keep Splint dry  Elevate extremity, can try and ice through the Splint   Do not stick anything into the Splint   - Ice and elevate  - Needs Medical Clearance  - Consent in the chart  - IV abx

## 2020-11-22 NOTE — ED PROVIDER NOTE - ATTENDING CONTRIBUTION TO CARE
------------ATTENDING NOTE------------   RHD pt w/ daughter c/o mechanical fall tonight while bending over, landed on R outstretched hand, c/o deformity and constant moderate ache to R wrist, nvi w/ bcr distally, soft compartments, no hand/elbow deformity/tenderness, no head injury/LOC or HA/AMS, CTL spine clinically clear, no additional injuries/complaints, otherwise at her baseline per daughter, awaiting imaging and close reassessments -->  - Dk Myles MD    ---------------------------------------------- ------------ATTENDING NOTE------------   RHD pt w/ daughter c/o mechanical fall tonight while bending over, landed on R outstretched hand, c/o deformity and constant moderate ache to R wrist, nvi w/ bcr distally, soft compartments, no hand/elbow deformity/tenderness, no head injury/LOC or HA/AMS, CTL spine clinically clear, no additional injuries/complaints, otherwise at her baseline per daughterPorfirio by EMS and pain controlled, awaiting imaging and close reassessments -->  - Dk Myles MD    ---------------------------------------------- ------------ATTENDING NOTE------------   RHD pt w/ daughter c/o mechanical fall tonight while bending over, landed on R outstretched hand, c/o deformity and constant moderate ache to R wrist, nvi w/ bcr distally, soft compartments, small abrasion volar w/o FB, no hand/elbow deformity/tenderness, no head injury/LOC or HA/AMS, CTL spine clinically clear, no additional injuries/complaints, otherwise at her baseline per daughterPorfirio by EMS and pain controlled, awaiting imaging and close reassessments -->  - Dk Myles MD    ---------------------------------------------- ------------ATTENDING NOTE------------   RHD pt w/ daughter c/o mechanical fall tonight while bending over, landed on R outstretched hand, c/o deformity and constant moderate ache to R wrist, nvi w/ bcr distally, soft compartments, small abrasion volar w/o FB, no hand/elbow deformity/tenderness, no head injury/LOC or HA/AMS, CTL spine clinically clear, no additional injuries/complaints, otherwise at her baseline per daughter, Fentanyl IM by EMS and pain controlled, awaiting imaging and close reassessments --> ortho consulted as open fx, no additional injuries/complaints, admitted.  - Dk Myles MD    ----------------------------------------------

## 2020-11-22 NOTE — ED PROVIDER NOTE - NSFOLLOWUPINSTRUCTIONS_ED_ALL_ED_FT
You came to the hospital after a witnessed mechanical fall with right wrist pain. You were given Tylenol for the pain and Tdap vaccine. Xray of your right wrist showed fractures (broken bones), and you were given wrist splint/cast after positioning of the joint for stabilization. Your family and you are in agreement that you will follow up with orthopedic doctor outpatient to discuss further management given the risks of hospitalization.     Please take the antibiotics as prescribed.     Please follow up with your primary care doctor within 1-3 days to monitor your pain and symptoms, as well as orthopedic doctor at the earliest appointment possible to discuss about further management.     If you don't get a call from the orthopedic clinic within 24-48 hours, please call the number listed.     If you experience fever, worsening pain, coolness/coldness, numbness, tingling, discoloration, redness or worsening swelling of your right wrist or hand, please return to the hospital for further evaluation.

## 2020-11-22 NOTE — ED PROVIDER NOTE - CARE PLAN
Principal Discharge DX:	Other type I or II open intra-articular fracture of distal end of right radius, initial encounter

## 2020-11-22 NOTE — H&P ADULT - HISTORY OF PRESENT ILLNESS
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 Patient denies head hit or LOC. Patient denies numbness or tingling in the RUE. Daughter reports that the patient has bilateral thenar and hypothenar wasting at baseline. Patient denies any other injuries.    PMH:  Dysphagia    Pulmonary embolism    GERD (gastroesophageal reflux disease)    Osteoporosis    Muscular dystrophy      PSH:  Tendon disorder, s/p Achilles lengthening >40 years ago    No significant past surgical history    NO SIGNIFICANT PAST SURGICAL HISTORY      AH:    Meds: See med rec    T(C): 36.8 (11-22-20 @ 20:31)  HR: 84 (11-22-20 @ 20:31)  BP: 134/83 (11-22-20 @ 20:31)  RR: 20 (11-22-20 @ 20:31)  SpO2: 99% (11-22-20 @ 20:31)  Wt(kg): --    Gen: NAD  PE RUE:  Skin intact,   visible deformity of wrist,   SILT med/rad/ulnar/axillary  +AIN/PIN/Ulnar/Radial/Musc/Median,   +shoulder/elbow ROM,   wrist ROM limited 2/2 pain,   +radial pulse, soft compartments,    Secondary:  No TTP over bony landmarks, SILT BL, ROM intact BL, distal pulses palpable.    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 Patient denies head hit or LOC. Patient denies numbness or tingling in the RUE. Daughter reports that the patient has bilateral thenar and hypothenar wasting at baseline. Patient denies any other injuries.    PMH:  Dysphagia    Pulmonary embolism    GERD (gastroesophageal reflux disease)    Osteoporosis    Muscular dystrophy      PSH:  Tendon disorder, s/p Achilles lengthening >40 years ago    No significant past surgical history    NO SIGNIFICANT PAST SURGICAL HISTORY      AH:    Meds: See med rec    T(C): 36.8 (11-22-20 @ 20:31)  HR: 84 (11-22-20 @ 20:31)  BP: 134/83 (11-22-20 @ 20:31)  RR: 20 (11-22-20 @ 20:31)  SpO2: 99% (11-22-20 @ 20:31)  Wt(kg): --    Gen: NAD  PE RUE:  <1 cm poke hole wound on the volar surface of the ulnar side   visible deformity of wrist,   SILT med/rad/ulnar/axillary  At rest all MCPs are extended and the PIPs are flexed, patient is able to flex and extend MCPs and PIPs  Thenar and hypothenar wasting  +AIN/PIN/Ulnar/Radial/Musc/Median,   +shoulder/elbow ROM,   wrist ROM limited 2/2 pain,   +radial pulse, soft compartments,    Secondary:  No TTP over bony landmarks, SILT BL, ROM intact BL, distal pulses palpable.    Imaging:  XR demonstrating Grade I Guistilo open R distal radius fracture    Procedure:  Under aseptic conditions, a hematoma block was administered to the fracture site using 10cc of 1% lidocaine. Closed reduction was performed and a well molded plaster splint was applied. The patient tolerated the procedure well and there we no complications. The patient was neurovascularly intact following reduction. Post-reduction xrays demonstrated acceptable alignment. 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 Patient denies head hit or LOC. Patient denies numbness or tingling in the RUE. Daughter reports that the patient has bilateral thenar and hypothenar wasting at baseline. Patient denies any other injuries.    PMH:  Dysphagia    Pulmonary embolism    GERD (gastroesophageal reflux disease)    Osteoporosis    Muscular dystrophy      PSH:  Tendon disorder, s/p Achilles lengthening >40 years ago    No significant past surgical history    NO SIGNIFICANT PAST SURGICAL HISTORY      AH:    Meds: See med rec    T(C): 36.8 (11-22-20 @ 20:31)  HR: 84 (11-22-20 @ 20:31)  BP: 134/83 (11-22-20 @ 20:31)  RR: 20 (11-22-20 @ 20:31)  SpO2: 99% (11-22-20 @ 20:31)  Wt(kg): --    Gen: NAD, A&O x1, to person   PE RUE:  <1 cm poke hole wound on the volar surface of the ulnar side   visible deformity of wrist,   SILT med/rad/ulnar/axillary  At rest all MCPs are extended and the PIPs are flexed, patient is able to flex and extend MCPs and PIPs  Thenar and hypothenar wasting  +AIN/PIN/Ulnar/Radial/Musc/Median,   +shoulder/elbow ROM,   wrist ROM limited 2/2 pain,   +radial pulse, soft compartments,    Secondary:  No TTP over bony landmarks, SILT BL, ROM intact BL, distal pulses palpable.    Imaging:  XR demonstrating Grade I Guistilo open R distal radius fracture    Procedure:  Under aseptic conditions, a hematoma block was administered to the fracture site using 10cc of 1% lidocaine. Closed reduction was performed and a well molded plaster splint was applied. The patient tolerated the procedure well and there we no complications. The patient was neurovascularly intact following reduction. Post-reduction xrays demonstrated acceptable alignment.

## 2020-11-22 NOTE — ED ADULT NURSE NOTE - NSIMPLEMENTINTERV_GEN_ALL_ED
Implemented All Universal Safety Interventions:  [place] to call system. Call bell, personal items and telephone within reach. Instruct patient to call for assistance. Room bathroom lighting operational. Non-slip footwear when patient is off stretcher. Physically safe environment: no spills, clutter or unnecessary equipment. Stretcher in lowest position, wheels locked, appropriate side rails in place.

## 2020-11-22 NOTE — ED PROVIDER NOTE - CHIEF COMPLAINT
The patient is a 80y Female complaining of The patient is a 80y Female complaining of fall with R wrist pain.

## 2020-11-22 NOTE — CONSULT NOTE ADULT - ASSESSMENT
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 Patient denies head hit or LOC. Patient denies numbness or tingling in the RUE. Daughter reports that the patient has bilateral thenar and hypothenar wasting at baseline. Patient denies any other injuries.  Patient  for OR in am

## 2020-11-22 NOTE — CONSULT NOTE ADULT - SUBJECTIVE AND OBJECTIVE BOX
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 Patient denies head hit or LOC. Patient denies numbness or tingling in the RUE. Daughter reports that the patient has bilateral thenar and hypothenar wasting at baseline. Patient denies any other injuries.      Imaging:  XR demonstrating Grade I Guistilo open R distal radius fracture    Procedure:  Under aseptic conditions, a hematoma block was administered to the fracture site using 10cc of 1% lidocaine. Closed reduction was performed and a well molded plaster splint was applied. The patient tolerated the procedure well and there we no complications. The patient was neurovascularly intact following reduction. Post-reduction xrays demonstrated acceptable alignment.   (22 Nov 2020 22:20)      MEDICATIONS  (STANDING):  acetaminophen   Tablet .. 975 milliGRAM(s) Oral every 8 hours  ceFAZolin   IVPB 2000 milliGRAM(s) IV Intermittent every 8 hours  donepezil 5 milliGRAM(s) Oral at bedtime  DULoxetine 30 milliGRAM(s) Oral daily  escitalopram 10 milliGRAM(s) Oral daily  loratadine 10 milliGRAM(s) Oral daily  pantoprazole    Tablet 40 milliGRAM(s) Oral before breakfast  senna 2 Tablet(s) Oral at bedtime  sodium chloride 0.9%. 1000 milliLiter(s) (100 mL/Hr) IV Continuous <Continuous>    MEDICATIONS  (PRN):  fluticasone propionate 50 MICROgram(s)/spray Nasal Spray 1 Spray(s) Both Nostrils once PRN nasal congestion  oxyCODONE    IR 2.5 milliGRAM(s) Oral every 4 hours PRN Moderate Pain (4 - 6)  oxyCODONE    IR 5 milliGRAM(s) Oral every 4 hours PRN Severe Pain (7 - 10)      Allergies  No Known Allergies          PAST MEDICAL HISTORY:  Dysphagia    Pulmonary embolism    GERD (gastroesophageal reflux disease)    Osteoporosis    Muscular dystrophy      PAST SURGICAL HISTORY:  Tendon disorder      Diet:  (   ) Regular   (   ) Low Sodium   (   ) Low Cholesterol   (   ) Diabetic   ( x  ) Other    FAMILY HISTORY:  N/C      SOCIAL HISTORY:    Substance Use: ( x ) never used  (  ) other:  Tobacco Usage:  (x   ) never smoked   (   ) former smoker   (   ) current smoker  (   ) pack years  (   ) last cigarette date  Alcohol Usage: none  Advanced Directives: (   ) None    (   ) DNR    (   ) DNI    (   ) Health Care Proxy:     REVIEW OF SYSTEM:  CONSTITUTIONAL: No fever, No change in weight, No fatigue  HEAD: No headache, No dizziness, No recent trauma  EYES: No eye pain, No visual disturbances, No discharge  ENT:  No difficulty hearing, No tinnitus, No vertigo, No sinus pain, No throat pain  NECK: No pain, No stiffness  BREASTS: No pain, No masses, No nipple discharge  RESPIRATORY: No cough, No wheezing, No chills, No hemoptysis, No shortness of breath at rest or exertional shortness of breath  CARDIOVASCULAR: No chest pain, No palpitations, No dizziness, No CHF, No arrhythmia, No cardiomegaly, No leg swelling  GASTROINTESTINAL: No abdominal, No epigastric pain. No nausea, No vomiting, No hematemesis, No diarrhea, No constipation. No melena, No hematochezia. No GERD  GENITOURINARY: No dysuria, No frequency, No hematuria, No incontinence, No nocturia, No hesitancy,  SKIN: No itching, No burning, No rashes, No lesions   LYMPH NODES: No history of enlarged glands  ENDOCRINE: No heat or cold intolerance, No hair loss. No osteoporosis, No thyroid disease  MUSCULOSKELETAL: No joint pain or swelling, No muscle, back, or extremity pain  Open radius fracture  PSYCHIATRIC: No depression, No anxiety, No mood swings, No difficulty sleeping  HEME/LYMPH: No easy bruising, No anticoagulants, No bleeding disorder, No bleeding gums  ALLERGY AND IMMUNOLOGIC: No hives, No eczema  NEUROLOGICAL: No memory loss, No loss of strength, No numbness, No tremors    VITALS:  Vital Signs Last 24 Hrs  T(C): 36.7 (22 Nov 2020 22:55), Max: 36.9 (22 Nov 2020 22:25)  T(F): 98.1 (22 Nov 2020 22:55), Max: 98.5 (22 Nov 2020 22:25)  HR: 83 (22 Nov 2020 22:55) (80 - 87)  BP: 126/81 (22 Nov 2020 22:55) (126/81 - 161/84)  BP(mean): --  RR: 17 (22 Nov 2020 22:55) (17 - 22)  SpO2: 97% (22 Nov 2020 22:55) (97% - 99%)  I&O's Summary    22 Nov 2020 07:01  -  23 Nov 2020 01:29  --------------------------------------------------------  IN: 100 mL / OUT: 100 mL / NET: 0 mL        PHYSICAL EXAM:  GENERAL: NAD, well nourished and conversant  HEAD:  Atraumatic  EYES: EOM, PERRLA, conjunctiva pink and sclera white  ENT: No tonsillar erythema, exudates, or enlargement, moist mucous membranes, good dentition, no lesions  NECK: Supple, No JVD, normal thyroid, carotids with normal upstrokes and no bruits  CHEST/LUNG: Clear to auscultation bilaterally, No rales, rhonchi, wheezing, or rubs  HEART: Regular rate and rhythm, No murmurs, rubs, or gallops  ABDOMEN: Soft, nondistended, no masses, guarding, tenderness or rebound, bowel sounds present  EXTREMITIES:  2+ Peripheral Pulses, No clubbing, cyanosis, or edema. No arthritis of shoulders, elbows, hands, hips, knees, ankles, or feet. No DJD C spine, T spine, or L/S spine  LYMPH: No lymphadenopathy noted  SKIN: No rashes or lesions  NERVOUS SYSTEM:  Alert & Oriented X3, normal cognitive function. Motor Strength 5/5 right upper and right lower.  5/5 left upper and left lower extremities, DTRs 2+ intact and symmetric    LABS:  EKG no acute changes      CBC Full  -  ( 22 Nov 2020 20:30 )  WBC Count : 8.10 K/uL  RBC Count : 4.56 M/uL  Hemoglobin : 14.2 g/dL  Hematocrit : 42.5 %  Platelet Count - Automated : 182 K/uL  Mean Cell Volume : 93.2 fl  Mean Cell Hemoglobin : 31.1 pg  Mean Cell Hemoglobin Concentration : 33.4 gm/dL  Auto Neutrophil # : 6.27 K/uL  Auto Lymphocyte # : 1.34 K/uL  Auto Monocyte # : 0.42 K/uL  Auto Eosinophil # : 0.01 K/uL  Auto Basophil # : 0.03 K/uL  Auto Neutrophil % : 77.4 %  Auto Lymphocyte % : 16.5 %  Auto Monocyte % : 5.2 %  Auto Eosinophil % : 0.1 %  Auto Basophil % : 0.4 %    11-22    138  |  101  |  14  ----------------------------<  108<H>  5.1   |  23  |  0.56    Ca    9.7      22 Nov 2020 19:53    TPro  7.4  /  Alb  4.5  /  TBili  0.5  /  DBili  x   /  AST  75<H>  /  ALT  30  /  AlkPhos  104  11-22    LIVER FUNCTIONS - ( 22 Nov 2020 19:53 )  Alb: 4.5 g/dL / Pro: 7.4 g/dL / ALK PHOS: 104 U/L / ALT: 30 U/L / AST: 75 U/L / GGT: x           PT/INR - ( 22 Nov 2020 20:40 )   PT: 12.1 sec;   INR: 1.01 ratio         PTT - ( 22 Nov 2020 20:40 )  PTT:22.6 sec    CAPILLARY BLOOD GLUCOSE          RADIOLOGY & ADDITIONAL TESTS:    Consultant(s):    Care Discussed with Consultants/Other Providers [ ] YES  [ ] NO

## 2020-11-22 NOTE — ED PROCEDURE NOTE - PROCEDURE ADDITIONAL DETAILS
Dominic Tang D.O., PGY2 (Resident)  Peripheral IV access in the Emergency Department obtained under dynamic ultrasound guidance with dark nonpulsatile blood return.  Catheter was flushed afterwards without any resistance or resulting extravasation.. IV catheter confirmed in compressible vein after insertion.    20G left brachial

## 2020-11-22 NOTE — ED PROVIDER NOTE - PMH
Dysphagia  Chronic cough, on thickened liquids  GERD (gastroesophageal reflux disease)    Muscular dystrophy    Osteoporosis    Pulmonary embolism  s/p short-term anticoagulation

## 2020-11-22 NOTE — ED ADULT NURSE REASSESSMENT NOTE - NS ED NURSE REASSESS COMMENT FT1
Pt tolerated reduction well. Linen change & warm blankets provided for comfort. Pt resting comfortably without complaints & does not appear to be in any acute distress at this time with VSS.

## 2020-11-22 NOTE — ED PROVIDER NOTE - OBJECTIVE STATEMENT
79 yo woman with Charcot-Kassidy-Tooth with associated muscular dystrophy (amb w/ RW and leg brace), chronic back pain 2/2 chronic vertebral fracture (Dx ), hx of PE s/p short term A/C, GERD, chronic cough w/ recent diagnosis of dysphagia on thickened liquids, recent RLE edema w/ neg DVT study, BIBEMS from home with R wrist pain s/p mechanical fall on open hand.   Per daughter at bedside, patient usually walks with a RW and leg braces, and was trying to bend over to  a sock with bad mechanics. She fell on her right hand, and immediate pain, bleeding, and swelling afterwards. Denied chest pain, SOB, dizziness, visual changes, fever, or general sickness prior to the fall. Denied cardiac or neurological symptoms after the fall. No head or neck injury witnessed, no LOC. Patient has dementia and depression, on Aricept 4, Cymbalta 30 and Lexapro 10. Currently mentating at baseline per daughter. A + O x name/ and hospital.   Had RLE edema recently, and had duplex last , which was negative. RLE edema has since resolved without interventions.   Also had chronic cough x 6 months, and had cardiac workup with Dr. block, which was negative (EF 63% on 10/22/20). Recently evaluated by speech and swallow. Went to regular diet with nectar thickened liquids, and cough has since improved. Denied fever, increased sputum or SOB. No known sick contact or COVID contact.   ROS otherwise neg.

## 2020-11-22 NOTE — ED PROVIDER NOTE - PHYSICAL EXAMINATION
PHYSICAL EXAM:  GENERAL: NAD when at rest; In moderate distress when moving RUE  HEENT:  Head atraumatic, EOMI, PERRLA, conjunctiva and sclera clear; Moist mucous membranes, normal oropharynx  NECK: Supple, No JVD, no lymphadenopathy, no spinal tenderness  CHEST/LUNG: Clear to auscultation bilaterally; No rales, rhonchi, wheezing, or rubs. Unlabored respirations on room air  HEART: Regular rate and rhythm; No murmurs, rubs, or gallops  ABDOMEN: Bowel sounds present; Soft, Nontender, Nondistended. No hepatomegaly  EXTREMITIES:  2+ Peripheral Pulses; No clubbing, cyanosis; 1+ BLE edema; LUE wnl; RUE: shoulder and elbow AROM WNL without significant pain. R fingers in flexion (chronic per daughter) w/ weak . Radial pulse intact. R wrist in significant deformity, tender to palpation, (+) edema with significant hematoma, and slow oozing. Unable to perform AROM or PROM  NERVOUS SYSTEM:  Alert & Oriented X 2, non-focal and spontaneous movements of all extremities, except R wrist 2/2 pain  SKIN: No rashes or lesions except mentioned above

## 2020-11-22 NOTE — ED ADULT NURSE NOTE - OBJECTIVE STATEMENT
80y M PMHx GERD, muscular dystrophy, & osteoporosis BIBEMS from home s/p fall. Pt states that she tripped in the kitchen & fell onto her Rt side, denies syncope, LOC, or hitting head. EMS state that pt received 50 mcg Fentanyl en route for RUE pain. Denies CP, SOB, H/A, N/V/D, abdominal pain, f/c, dizziness, & urinary symptoms. A&Ox4. No respiratory distress noted on RA. Abdomen soft, nondistended, & nontender to palpation. Skin warm, dry, & intact. MAEx3 except RUE. No sensory deficits to Rt distal fingertips & has full AROM of fingers with mild pain on active movement. No B/L temperature discrepancies. Obvious deformity noted to Rt wrist that is firm & tender to palpation. No open wounds noted to the Rt arm. No LE edema noted on exam but pt has BLE braces. Pt resting comfortably with no complaints at this time. Pt's bed in the lowest position & explained POC to pt. Will continue to reassess. 80y M PMHx GERD, muscular dystrophy, & osteoporosis BIBEMS from home s/p fall. Pt states that she tripped in the kitchen & fell onto her Rt side, denies syncope, LOC, or hitting head. EMS state that pt received 50 mcg Fentanyl en route for RUE pain. Denies CP, SOB, H/A, N/V/D, abdominal pain, f/c, dizziness, & urinary symptoms. A&Ox4. No respiratory distress noted on RA. Abdomen soft, nondistended, & nontender to palpation. Skin warm, dry, & intact. MAEx3 except RUE. No sensory deficits to Rt distal fingertips & has full AROM of fingers with mild pain on active movement. No B/L temperature discrepancies. Obvious deformity noted to Rt wrist that is firm & tender to palpation. 1 cm bleeding wound noted to dorsal surface of forearm just proximal to wrist. No LE edema noted on exam but pt has BLE braces. Pt resting comfortably with no complaints at this time. Pt's bed in the lowest position & explained POC to pt. Will continue to reassess.

## 2020-11-22 NOTE — ED PROVIDER NOTE - PROGRESS NOTE DETAILS
Informed patient and daughter regarding xray results. Patient and daughter feel comfortable about taking patient home with outpatient orthopedic follow up. Will apply R wrist splint.

## 2020-11-22 NOTE — ED PROVIDER NOTE - NS ED ROS FT
REVIEW OF SYSTEMS:    CONSTITUTIONAL: No fevers or chills  EYES/ENT: No visual changes;  No vertigo or throat pain   NECK: No pain or stiffness  RESPIRATORY: (+) chronic cough, no wheezing, hemoptysis; No shortness of breath  CARDIOVASCULAR: No chest pain or palpitations  GASTROINTESTINAL: No abdominal or epigastric pain. No nausea, vomiting, or hematemesis; No diarrhea or constipation. No melena or hematochezia.  EXTREMITIES: See HPI.   GENITOURINARY: No dysuria, frequency or hematuria  NEUROLOGICAL: No numbness or weakness  SKIN: No itching, rashes

## 2020-11-22 NOTE — ED ADULT TRIAGE NOTE - CHIEF COMPLAINT QUOTE
R hand pain s/p fall. No LOC or use of AC. R hand pain s/p fall. No LOC or use of AC. Given 50mcg fentanyl by EMS.

## 2020-11-22 NOTE — ED PROVIDER NOTE - CLINICAL SUMMARY MEDICAL DECISION MAKING FREE TEXT BOX
see MD Note 81 yo woman with Charcot-Kassidy-Tooth with associated muscular dystrophy (amb w/ RW and leg brace), chronic back pain 2/2 chronic vertebral fracture (Dx 2012), hx of PE s/p short term A/C, GERD, chronic cough w/ recent diagnosis of dysphagia on thickened liquids, recent RLE edema w/ neg DVT study, BIBEMS from home with R wrist pain s/p mechanical fall on open hand.   Witnessed mechanical fall without head injury, no further cardiac or neurological workup indicated.   Xray of target joints. Will determine level of care afterwards. Pain control as indicated.     see MD Note

## 2020-11-23 DIAGNOSIS — S52.571B: ICD-10-CM

## 2020-11-23 DIAGNOSIS — G71.00 MUSCULAR DYSTROPHY, UNSPECIFIED: ICD-10-CM

## 2020-11-23 DIAGNOSIS — R13.10 DYSPHAGIA, UNSPECIFIED: ICD-10-CM

## 2020-11-23 DIAGNOSIS — K21.9 GASTRO-ESOPHAGEAL REFLUX DISEASE WITHOUT ESOPHAGITIS: ICD-10-CM

## 2020-11-23 LAB
ANION GAP SERPL CALC-SCNC: 12 MMOL/L — SIGNIFICANT CHANGE UP (ref 5–17)
ANION GAP SERPL CALC-SCNC: 9 MMOL/L — SIGNIFICANT CHANGE UP (ref 5–17)
APTT BLD: 28.1 SEC — SIGNIFICANT CHANGE UP (ref 27.5–35.5)
BLD GP AB SCN SERPL QL: NEGATIVE — SIGNIFICANT CHANGE UP
BUN SERPL-MCNC: 12 MG/DL — SIGNIFICANT CHANGE UP (ref 7–23)
BUN SERPL-MCNC: 9 MG/DL — SIGNIFICANT CHANGE UP (ref 7–23)
CALCIUM SERPL-MCNC: 8.3 MG/DL — LOW (ref 8.4–10.5)
CALCIUM SERPL-MCNC: 8.7 MG/DL — SIGNIFICANT CHANGE UP (ref 8.4–10.5)
CHLORIDE SERPL-SCNC: 105 MMOL/L — SIGNIFICANT CHANGE UP (ref 96–108)
CHLORIDE SERPL-SCNC: 107 MMOL/L — SIGNIFICANT CHANGE UP (ref 96–108)
CO2 SERPL-SCNC: 22 MMOL/L — SIGNIFICANT CHANGE UP (ref 22–31)
CO2 SERPL-SCNC: 25 MMOL/L — SIGNIFICANT CHANGE UP (ref 22–31)
CREAT SERPL-MCNC: 0.52 MG/DL — SIGNIFICANT CHANGE UP (ref 0.5–1.3)
CREAT SERPL-MCNC: 0.6 MG/DL — SIGNIFICANT CHANGE UP (ref 0.5–1.3)
GLUCOSE SERPL-MCNC: 102 MG/DL — HIGH (ref 70–99)
GLUCOSE SERPL-MCNC: 147 MG/DL — HIGH (ref 70–99)
HCT VFR BLD CALC: 39.6 % — SIGNIFICANT CHANGE UP (ref 34.5–45)
HCT VFR BLD CALC: 41.7 % — SIGNIFICANT CHANGE UP (ref 34.5–45)
HGB BLD-MCNC: 13.4 G/DL — SIGNIFICANT CHANGE UP (ref 11.5–15.5)
HGB BLD-MCNC: 13.7 G/DL — SIGNIFICANT CHANGE UP (ref 11.5–15.5)
INR BLD: 1.06 RATIO — SIGNIFICANT CHANGE UP (ref 0.88–1.16)
MCHC RBC-ENTMCNC: 30.8 PG — SIGNIFICANT CHANGE UP (ref 27–34)
MCHC RBC-ENTMCNC: 31.3 PG — SIGNIFICANT CHANGE UP (ref 27–34)
MCHC RBC-ENTMCNC: 32.9 GM/DL — SIGNIFICANT CHANGE UP (ref 32–36)
MCHC RBC-ENTMCNC: 33.8 GM/DL — SIGNIFICANT CHANGE UP (ref 32–36)
MCV RBC AUTO: 92.5 FL — SIGNIFICANT CHANGE UP (ref 80–100)
MCV RBC AUTO: 93.7 FL — SIGNIFICANT CHANGE UP (ref 80–100)
NRBC # BLD: 0 /100 WBCS — SIGNIFICANT CHANGE UP (ref 0–0)
NRBC # BLD: 0 /100 WBCS — SIGNIFICANT CHANGE UP (ref 0–0)
PLATELET # BLD AUTO: 178 K/UL — SIGNIFICANT CHANGE UP (ref 150–400)
PLATELET # BLD AUTO: 180 K/UL — SIGNIFICANT CHANGE UP (ref 150–400)
POTASSIUM SERPL-MCNC: 3.3 MMOL/L — LOW (ref 3.5–5.3)
POTASSIUM SERPL-MCNC: 3.5 MMOL/L — SIGNIFICANT CHANGE UP (ref 3.5–5.3)
POTASSIUM SERPL-SCNC: 3.3 MMOL/L — LOW (ref 3.5–5.3)
POTASSIUM SERPL-SCNC: 3.5 MMOL/L — SIGNIFICANT CHANGE UP (ref 3.5–5.3)
PROTHROM AB SERPL-ACNC: 12.7 SEC — SIGNIFICANT CHANGE UP (ref 10.6–13.6)
RBC # BLD: 4.28 M/UL — SIGNIFICANT CHANGE UP (ref 3.8–5.2)
RBC # BLD: 4.45 M/UL — SIGNIFICANT CHANGE UP (ref 3.8–5.2)
RBC # FLD: 12.6 % — SIGNIFICANT CHANGE UP (ref 10.3–14.5)
RBC # FLD: 12.7 % — SIGNIFICANT CHANGE UP (ref 10.3–14.5)
RH IG SCN BLD-IMP: POSITIVE — SIGNIFICANT CHANGE UP
SARS-COV-2 IGG SERPL QL IA: NEGATIVE — SIGNIFICANT CHANGE UP
SARS-COV-2 IGM SERPL IA-ACNC: <0.1 INDEX — SIGNIFICANT CHANGE UP
SARS-COV-2 RNA SPEC QL NAA+PROBE: SIGNIFICANT CHANGE UP
SODIUM SERPL-SCNC: 139 MMOL/L — SIGNIFICANT CHANGE UP (ref 135–145)
SODIUM SERPL-SCNC: 141 MMOL/L — SIGNIFICANT CHANGE UP (ref 135–145)
WBC # BLD: 6.95 K/UL — SIGNIFICANT CHANGE UP (ref 3.8–10.5)
WBC # BLD: 7.27 K/UL — SIGNIFICANT CHANGE UP (ref 3.8–10.5)
WBC # FLD AUTO: 6.95 K/UL — SIGNIFICANT CHANGE UP (ref 3.8–10.5)
WBC # FLD AUTO: 7.27 K/UL — SIGNIFICANT CHANGE UP (ref 3.8–10.5)

## 2020-11-23 PROCEDURE — 11012 DEB SKIN BONE AT FX SITE: CPT | Mod: RT

## 2020-11-23 PROCEDURE — 25609 OPTX DST RD XART FX/EP SEP3+: CPT | Mod: RT

## 2020-11-23 RX ORDER — PANTOPRAZOLE SODIUM 20 MG/1
40 TABLET, DELAYED RELEASE ORAL
Refills: 0 | Status: DISCONTINUED | OUTPATIENT
Start: 2020-11-23 | End: 2020-11-25

## 2020-11-23 RX ORDER — SODIUM CHLORIDE 9 MG/ML
1000 INJECTION, SOLUTION INTRAVENOUS
Refills: 0 | Status: DISCONTINUED | OUTPATIENT
Start: 2020-11-23 | End: 2020-11-23

## 2020-11-23 RX ORDER — ONDANSETRON 8 MG/1
4 TABLET, FILM COATED ORAL ONCE
Refills: 0 | Status: DISCONTINUED | OUTPATIENT
Start: 2020-11-23 | End: 2020-11-23

## 2020-11-23 RX ORDER — CEFAZOLIN SODIUM 1 G
2000 VIAL (EA) INJECTION EVERY 8 HOURS
Refills: 0 | Status: COMPLETED | OUTPATIENT
Start: 2020-11-23 | End: 2020-11-24

## 2020-11-23 RX ORDER — SENNA PLUS 8.6 MG/1
2 TABLET ORAL AT BEDTIME
Refills: 0 | Status: DISCONTINUED | OUTPATIENT
Start: 2020-11-23 | End: 2020-11-25

## 2020-11-23 RX ORDER — ONDANSETRON 8 MG/1
4 TABLET, FILM COATED ORAL EVERY 6 HOURS
Refills: 0 | Status: DISCONTINUED | OUTPATIENT
Start: 2020-11-23 | End: 2020-11-25

## 2020-11-23 RX ORDER — OXYCODONE HYDROCHLORIDE 5 MG/1
2.5 TABLET ORAL EVERY 4 HOURS
Refills: 0 | Status: DISCONTINUED | OUTPATIENT
Start: 2020-11-23 | End: 2020-11-23

## 2020-11-23 RX ORDER — DULOXETINE HYDROCHLORIDE 30 MG/1
30 CAPSULE, DELAYED RELEASE ORAL DAILY
Refills: 0 | Status: DISCONTINUED | OUTPATIENT
Start: 2020-11-23 | End: 2020-11-25

## 2020-11-23 RX ORDER — LANOLIN ALCOHOL/MO/W.PET/CERES
5 CREAM (GRAM) TOPICAL AT BEDTIME
Refills: 0 | Status: DISCONTINUED | OUTPATIENT
Start: 2020-11-23 | End: 2020-11-25

## 2020-11-23 RX ORDER — BENZOCAINE AND MENTHOL 5; 1 G/100ML; G/100ML
1 LIQUID ORAL
Refills: 0 | Status: DISCONTINUED | OUTPATIENT
Start: 2020-11-23 | End: 2020-11-25

## 2020-11-23 RX ORDER — FLUTICASONE PROPIONATE 50 MCG
1 SPRAY, SUSPENSION NASAL ONCE
Refills: 0 | Status: COMPLETED | OUTPATIENT
Start: 2020-11-23 | End: 2020-11-24

## 2020-11-23 RX ORDER — ASPIRIN/CALCIUM CARB/MAGNESIUM 324 MG
325 TABLET ORAL
Refills: 0 | Status: DISCONTINUED | OUTPATIENT
Start: 2020-11-23 | End: 2020-11-25

## 2020-11-23 RX ORDER — CELECOXIB 200 MG/1
100 CAPSULE ORAL DAILY
Refills: 0 | Status: DISCONTINUED | OUTPATIENT
Start: 2020-11-23 | End: 2020-11-25

## 2020-11-23 RX ORDER — SODIUM CHLORIDE 9 MG/ML
1000 INJECTION INTRAMUSCULAR; INTRAVENOUS; SUBCUTANEOUS
Refills: 0 | Status: DISCONTINUED | OUTPATIENT
Start: 2020-11-23 | End: 2020-11-23

## 2020-11-23 RX ORDER — TRAMADOL HYDROCHLORIDE 50 MG/1
25 TABLET ORAL EVERY 4 HOURS
Refills: 0 | Status: DISCONTINUED | OUTPATIENT
Start: 2020-11-23 | End: 2020-11-25

## 2020-11-23 RX ORDER — ACETAMINOPHEN 500 MG
975 TABLET ORAL EVERY 8 HOURS
Refills: 0 | Status: DISCONTINUED | OUTPATIENT
Start: 2020-11-24 | End: 2020-11-25

## 2020-11-23 RX ORDER — TRAMADOL HYDROCHLORIDE 50 MG/1
50 TABLET ORAL EVERY 8 HOURS
Refills: 0 | Status: DISCONTINUED | OUTPATIENT
Start: 2020-11-23 | End: 2020-11-25

## 2020-11-23 RX ORDER — ACETAMINOPHEN 500 MG
975 TABLET ORAL EVERY 8 HOURS
Refills: 0 | Status: DISCONTINUED | OUTPATIENT
Start: 2020-11-23 | End: 2020-11-23

## 2020-11-23 RX ORDER — DONEPEZIL HYDROCHLORIDE 10 MG/1
5 TABLET, FILM COATED ORAL AT BEDTIME
Refills: 0 | Status: DISCONTINUED | OUTPATIENT
Start: 2020-11-23 | End: 2020-11-25

## 2020-11-23 RX ORDER — LORATADINE 10 MG/1
10 TABLET ORAL DAILY
Refills: 0 | Status: DISCONTINUED | OUTPATIENT
Start: 2020-11-23 | End: 2020-11-25

## 2020-11-23 RX ORDER — ACETAMINOPHEN 500 MG
1000 TABLET ORAL ONCE
Refills: 0 | Status: COMPLETED | OUTPATIENT
Start: 2020-11-23 | End: 2020-11-23

## 2020-11-23 RX ORDER — HYDROMORPHONE HYDROCHLORIDE 2 MG/ML
0.25 INJECTION INTRAMUSCULAR; INTRAVENOUS; SUBCUTANEOUS
Refills: 0 | Status: DISCONTINUED | OUTPATIENT
Start: 2020-11-23 | End: 2020-11-23

## 2020-11-23 RX ORDER — OXYCODONE HYDROCHLORIDE 5 MG/1
5 TABLET ORAL EVERY 6 HOURS
Refills: 0 | Status: DISCONTINUED | OUTPATIENT
Start: 2020-11-23 | End: 2020-11-25

## 2020-11-23 RX ORDER — OXYCODONE HYDROCHLORIDE 5 MG/1
5 TABLET ORAL EVERY 4 HOURS
Refills: 0 | Status: DISCONTINUED | OUTPATIENT
Start: 2020-11-23 | End: 2020-11-23

## 2020-11-23 RX ORDER — ESCITALOPRAM OXALATE 10 MG/1
10 TABLET, FILM COATED ORAL DAILY
Refills: 0 | Status: DISCONTINUED | OUTPATIENT
Start: 2020-11-23 | End: 2020-11-25

## 2020-11-23 RX ADMIN — SENNA PLUS 2 TABLET(S): 8.6 TABLET ORAL at 22:07

## 2020-11-23 RX ADMIN — Medication 5 MILLIGRAM(S): at 22:07

## 2020-11-23 RX ADMIN — ESCITALOPRAM OXALATE 10 MILLIGRAM(S): 10 TABLET, FILM COATED ORAL at 22:07

## 2020-11-23 RX ADMIN — Medication 1000 MILLIGRAM(S): at 22:30

## 2020-11-23 RX ADMIN — Medication 100 MILLIGRAM(S): at 20:21

## 2020-11-23 RX ADMIN — Medication 400 MILLIGRAM(S): at 22:06

## 2020-11-23 RX ADMIN — DONEPEZIL HYDROCHLORIDE 5 MILLIGRAM(S): 10 TABLET, FILM COATED ORAL at 22:07

## 2020-11-23 RX ADMIN — Medication 1000 MILLIGRAM(S): at 20:08

## 2020-11-23 RX ADMIN — LORATADINE 10 MILLIGRAM(S): 10 TABLET ORAL at 20:21

## 2020-11-23 RX ADMIN — DULOXETINE HYDROCHLORIDE 30 MILLIGRAM(S): 30 CAPSULE, DELAYED RELEASE ORAL at 20:21

## 2020-11-23 RX ADMIN — Medication 400 MILLIGRAM(S): at 19:15

## 2020-11-23 RX ADMIN — Medication 100 MILLIGRAM(S): at 04:05

## 2020-11-23 NOTE — PROGRESS NOTE ADULT - ASSESSMENT
Impression: Stable       Plan:   Continue present treatment                 preop, NPO, surgery today                 Continue to monitor    Rob Gonzáles PA-C  Orthopaedic Surgery  Team pager [phone]  [phone]

## 2020-11-23 NOTE — PROGRESS NOTE ADULT - SUBJECTIVE AND OBJECTIVE BOX
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 Patient denies head hit or LOC. Patient denies numbness or tingling in the RUE. Daughter reports that the patient has bilateral thenar and hypothenar wasting at baseline. Patient denies any other injuries.  · Operative Findings	R distal radius fracture ORIF  Denies sob or chest pain post op      MEDICATIONS  (STANDING):  acetaminophen   Tablet .. 975 milliGRAM(s) Oral every 8 hours  ceFAZolin   IVPB 2000 milliGRAM(s) IV Intermittent every 8 hours  donepezil 5 milliGRAM(s) Oral at bedtime  DULoxetine 30 milliGRAM(s) Oral daily  escitalopram 10 milliGRAM(s) Oral daily  loratadine 10 milliGRAM(s) Oral daily  pantoprazole    Tablet 40 milliGRAM(s) Oral before breakfast  senna 2 Tablet(s) Oral at bedtime  sodium chloride 0.9%. 1000 milliLiter(s) (100 mL/Hr) IV Continuous <Continuous>    MEDICATIONS  (PRN):  fluticasone propionate 50 MICROgram(s)/spray Nasal Spray 1 Spray(s) Both Nostrils once PRN nasal congestion  oxyCODONE    IR 2.5 milliGRAM(s) Oral every 4 hours PRN Moderate Pain (4 - 6)  oxyCODONE    IR 5 milliGRAM(s) Oral every 4 hours PRN Severe Pain (7 - 10)        Vital Signs Last 24 Hrs  T(C): 36.6)  T(F): 97.8  HR: 86)  BP: 107/67  BP(mean): 100   RR: 16   SpO2: 96%       PHYSICAL EXAM:  GENERAL: NAD, well nourished and conversant  HEAD:  Atraumatic  EYES: EOM, PERRLA, conjunctiva pink and sclera white  ENT: No tonsillar erythema, exudates, or enlargement, moist mucous membranes, good dentition, no lesions  NECK: Supple, No JVD, normal thyroid, carotids with normal upstrokes and no bruits  CHEST/LUNG: Clear to auscultation bilaterally, No rales, rhonchi, wheezing, or rubs  HEART: Regular rate and rhythm, No murmurs, rubs, or gallops  ABDOMEN: Soft, nondistended, no masses, guarding, tenderness or rebound, bowel sounds present  EXTREMITIES:  2+ Peripheral Pulses,   ORIF rigt distal r  LYMPH: No lymphadenopathy noted  SKIN: No rashes or lesions  NERVOUS SYSTEM:  Alert & Oriented X3, normal cognitive function. Motor Strength 5/5 right upper and right lower.  5/5 left upper and left lower extremities, DTRs 2+ intact and symmetric    LABS:        CBC Full  -  ( 23 Nov 2020 15:46 )  WBC Count : 7.27 K/uL  RBC Count : 4.45 M/uL  Hemoglobin : 13.7 g/dL  Hematocrit : 41.7 %  Platelet Count - Automated : 180 K/uL  Mean Cell Volume : 93.7 fl  Mean Cell Hemoglobin : 30.8 pg  Mean Cell Hemoglobin Concentration : 32.9 gm/dL  Auto Neutrophil # : x  Auto Lymphocyte # : x  Auto Monocyte # : x  Auto Eosinophil # : x  Auto Basophil # : x  Auto Neutrophil % : x  Auto Lymphocyte % : x  Auto Monocyte % : x  Auto Eosinophil % : x  Auto Basophil % : x    11-23    139  |  105  |  9   ----------------------------<  147<H>  3.5   |  22  |  0.52    Ca    8.3<L>      23 Nov 2020 15:46    TPro  7.4  /  Alb  4.5  /  TBili  0.5  /  DBili  x   /  AST  75<H>  /  ALT  30  /  AlkPhos  104  11-22    LIVER FUNCTIONS - ( 22 Nov 2020 19:53 )  Alb: 4.5 g/dL / Pro: 7.4 g/dL / ALK PHOS: 104 U/L / ALT: 30 U/L / AST: 75 U/L / GGT: x           PT/INR - ( 23 Nov 2020 04:22 )   PT: 12.7 sec;   INR: 1.06 ratio         PTT - ( 23 Nov 2020 04:22 )  PTT:28.1 sec      CBC Full  -  ( 22 Nov 2020 20:30 )  WBC Count : 8.10 K/uL  RBC Count : 4.56 M/uL  Hemoglobin : 14.2 g/dL  Hematocrit : 42.5 %  Platelet Count - Automated : 182 K/uL  Mean Cell Volume : 93.2 fl  Mean Cell Hemoglobin : 31.1 pg  Mean Cell Hemoglobin Concentration : 33.4 gm/dL  Auto Neutrophil # : 6.27 K/uL  Auto Lymphocyte # : 1.34 K/uL  Auto Monocyte # : 0.42 K/uL  Auto Eosinophil # : 0.01 K/uL  Auto Basophil # : 0.03 K/uL  Auto Neutrophil % : 77.4 %  Auto Lymphocyte % : 16.5 %  Auto Monocyte % : 5.2 %  Auto Eosinophil % : 0.1 %  Auto Basophil % : 0.4 %    11-22    138  |  101  |  14  ----------------------------<  108<H>  5.1   |  23  |  0.56    Ca    9.7      22 Nov 2020 19:53    TPro  7.4  /  Alb  4.5  /  TBili  0.5  /  DBili  x   /  AST  75<H>  /  ALT  30  /  AlkPhos  104  11-22    LIVER FUNCTIONS - ( 22 Nov 2020 19:53 )  Alb: 4.5 g/dL / Pro: 7.4 g/dL / ALK PHOS: 104 U/L / ALT: 30 U/L / AST: 75 U/L / GGT: x           PT/INR - ( 22 Nov 2020 20:40 )   PT: 12.1 sec;   INR: 1.01 ratio         PTT - ( 22 Nov 2020 20:40 )  PTT:22.6 sec    CAPILLARY BLOOD GLUCOSE          RADIOLOGY & ADDITIONAL TESTS:    Consultant(s):    Care Discussed with Consultants/Other Providers [ ] YES  [ ] NO

## 2020-11-23 NOTE — PROGRESS NOTE ADULT - SUBJECTIVE AND OBJECTIVE BOX
ORTHO  Patient is a 80y old  Female who presents with a chief complaint of open right distal radius fracture (22 Nov 2020 23:15)    Pt. resting without complaint    VS-  T(C): 36.6 (11-23-20 @ 03:52), Max: 36.9 (11-22-20 @ 22:25)  HR: 80 (11-23-20 @ 03:52) (80 - 87)  BP: 135/75 (11-23-20 @ 03:52) (126/81 - 161/84)  RR: 18 (11-23-20 @ 03:52) (17 - 22)  SpO2: 95% (11-23-20 @ 03:52) (95% - 99%)  Wt(kg): --    M.S. A&O  Extremity- Right UE- Short arm splint in place, elevated with blankets                          Mod edema and ecchymosis digits  Neuro-              Motor- (+)min motion of digits              Sensation- grossly intact to light touch               Calves- soft, nontender- PAS                               13.4   6.95  )-----------( 178      ( 23 Nov 2020 04:22 )             39.6     11-23    141  |  107  |  12  ----------------------------<  102<H>  3.3<L>   |  25  |  0.60    Ca    8.7      23 Nov 2020 04:22    TPro  7.4  /  Alb  4.5  /  TBili  0.5  /  DBili  x   /  AST  75<H>  /  ALT  30  /  AlkPhos  104  11-22

## 2020-11-23 NOTE — PROGRESS NOTE ADULT - ASSESSMENT
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 Patient denies head hit or LOC. Patient denies numbness or tingling in the RUE. Daughter reports that the patient has bilateral thenar and hypothenar wasting at baseline. Patient denies any other injuries.  · Operative Findings	R distal radius fracture ORIF    Tolerated surgery well no sob or chest pain

## 2020-11-23 NOTE — CHART NOTE - NSCHARTNOTEFT_GEN_A_CORE
POC    Seen in RR  No complaints;  Block still in effect   Denies SOB/CP/N/V;    T(C): 36.3 (11-23-20 @ 15:05)  T(F): 97.3 (11-23-20 @ 15:05)  HR: 85 (11-23-20 @ 16:15)  BP: 139/79 (11-23-20 @ 13:47)  RR: 14 (11-23-20 @ 16:15)  SpO2: 95% (11-23-20 @ 16:15)  Wt(kg): --    Physical Exam  Gen: NAD    RUE:   Dressing CDI;   Posey in place  Sling on  Drain yes [ ]  No [ x]  Decreased motor and sensory 2/2 anesthesia / block  digits: warm / well-perfused  cap refill brisk @ 2-3 secs   2+ Radial pulse                             13.7   7.27  )-----------( 180      ( 23 Nov 2020 15:46 )             41.7     11-23    139  |  105  |  9   ----------------------------<  147<H>  3.5   |  22  |  0.52        A/P: 80y Female s/p  Open Reduction Internal Fixation / Surgical Repair R  fracture    -Pain control; Ice prn; Elevate  -DVT ppx; ASA BID  -Am labs  -PT eval: WBAT RUE: ROM shoulder / elbow / digits  - Low dose narcs  - Pt has h/o dysphagia        On dysphagia diet       Nutrition c/s       Consider Speech and Swallow  -Dispo planning: TBD      ***See Above  Uriel MIXON  Orthopedics  B: [phone]  S: [phone]

## 2020-11-23 NOTE — PROGRESS NOTE ADULT - SUBJECTIVE AND OBJECTIVE BOX
ORTHO ATTENDING POST OP    s/p I and D, ORIF R distal radius  Bulky dressing  elevation  keep dressing clean and dry until office visit  sling- can be removed PRN  WBAT RUE on walker for ambulation  ROM as tolerated RUE  ancef x 24h  CBC in RR and AM   BID for DVT ppx  venodynes

## 2020-11-24 ENCOUNTER — TRANSCRIPTION ENCOUNTER (OUTPATIENT)
Age: 80
End: 2020-11-24

## 2020-11-24 LAB
ANION GAP SERPL CALC-SCNC: 12 MMOL/L — SIGNIFICANT CHANGE UP (ref 5–17)
ANISOCYTOSIS BLD QL: SLIGHT — SIGNIFICANT CHANGE UP
BASOPHILS # BLD AUTO: 0 K/UL — SIGNIFICANT CHANGE UP (ref 0–0.2)
BASOPHILS NFR BLD AUTO: 0 % — SIGNIFICANT CHANGE UP (ref 0–2)
BUN SERPL-MCNC: 14 MG/DL — SIGNIFICANT CHANGE UP (ref 7–23)
CALCIUM SERPL-MCNC: 8.8 MG/DL — SIGNIFICANT CHANGE UP (ref 8.4–10.5)
CHLORIDE SERPL-SCNC: 103 MMOL/L — SIGNIFICANT CHANGE UP (ref 96–108)
CO2 SERPL-SCNC: 24 MMOL/L — SIGNIFICANT CHANGE UP (ref 22–31)
CREAT SERPL-MCNC: 0.7 MG/DL — SIGNIFICANT CHANGE UP (ref 0.5–1.3)
DACRYOCYTES BLD QL SMEAR: SLIGHT — SIGNIFICANT CHANGE UP
ELLIPTOCYTES BLD QL SMEAR: SLIGHT — SIGNIFICANT CHANGE UP
EOSINOPHIL # BLD AUTO: 0 K/UL — SIGNIFICANT CHANGE UP (ref 0–0.5)
EOSINOPHIL NFR BLD AUTO: 0 % — SIGNIFICANT CHANGE UP (ref 0–6)
GLUCOSE SERPL-MCNC: 99 MG/DL — SIGNIFICANT CHANGE UP (ref 70–99)
HCT VFR BLD CALC: 39.7 % — SIGNIFICANT CHANGE UP (ref 34.5–45)
HGB BLD-MCNC: 13 G/DL — SIGNIFICANT CHANGE UP (ref 11.5–15.5)
LG PLATELETS BLD QL AUTO: SLIGHT — SIGNIFICANT CHANGE UP
LYMPHOCYTES # BLD AUTO: 2.09 K/UL — SIGNIFICANT CHANGE UP (ref 1–3.3)
LYMPHOCYTES # BLD AUTO: 23 % — SIGNIFICANT CHANGE UP (ref 13–44)
MACROCYTES BLD QL: SLIGHT — SIGNIFICANT CHANGE UP
MANUAL SMEAR VERIFICATION: SIGNIFICANT CHANGE UP
MCHC RBC-ENTMCNC: 31.1 PG — SIGNIFICANT CHANGE UP (ref 27–34)
MCHC RBC-ENTMCNC: 32.7 GM/DL — SIGNIFICANT CHANGE UP (ref 32–36)
MCV RBC AUTO: 95 FL — SIGNIFICANT CHANGE UP (ref 80–100)
MICROCYTES BLD QL: SLIGHT — SIGNIFICANT CHANGE UP
MONOCYTES # BLD AUTO: 0.64 K/UL — SIGNIFICANT CHANGE UP (ref 0–0.9)
MONOCYTES NFR BLD AUTO: 7 % — SIGNIFICANT CHANGE UP (ref 2–14)
NEUTROPHILS # BLD AUTO: 6.36 K/UL — SIGNIFICANT CHANGE UP (ref 1.8–7.4)
NEUTROPHILS NFR BLD AUTO: 70 % — SIGNIFICANT CHANGE UP (ref 43–77)
NRBC # BLD: 0 /100 — SIGNIFICANT CHANGE UP (ref 0–0)
PLAT MORPH BLD: ABNORMAL
PLATELET # BLD AUTO: 143 K/UL — LOW (ref 150–400)
POIKILOCYTOSIS BLD QL AUTO: SLIGHT — SIGNIFICANT CHANGE UP
POLYCHROMASIA BLD QL SMEAR: SLIGHT — SIGNIFICANT CHANGE UP
POTASSIUM SERPL-MCNC: 3.6 MMOL/L — SIGNIFICANT CHANGE UP (ref 3.5–5.3)
POTASSIUM SERPL-SCNC: 3.6 MMOL/L — SIGNIFICANT CHANGE UP (ref 3.5–5.3)
RBC # BLD: 4.18 M/UL — SIGNIFICANT CHANGE UP (ref 3.8–5.2)
RBC # FLD: 12.7 % — SIGNIFICANT CHANGE UP (ref 10.3–14.5)
RBC BLD AUTO: ABNORMAL
SODIUM SERPL-SCNC: 139 MMOL/L — SIGNIFICANT CHANGE UP (ref 135–145)
WBC # BLD: 9.09 K/UL — SIGNIFICANT CHANGE UP (ref 3.8–10.5)
WBC # FLD AUTO: 9.09 K/UL — SIGNIFICANT CHANGE UP (ref 3.8–10.5)

## 2020-11-24 RX ORDER — SODIUM CHLORIDE 9 MG/ML
1000 INJECTION INTRAMUSCULAR; INTRAVENOUS; SUBCUTANEOUS
Refills: 0 | Status: DISCONTINUED | OUTPATIENT
Start: 2020-11-24 | End: 2020-11-25

## 2020-11-24 RX ADMIN — DULOXETINE HYDROCHLORIDE 30 MILLIGRAM(S): 30 CAPSULE, DELAYED RELEASE ORAL at 11:40

## 2020-11-24 RX ADMIN — Medication 975 MILLIGRAM(S): at 05:40

## 2020-11-24 RX ADMIN — TRAMADOL HYDROCHLORIDE 50 MILLIGRAM(S): 50 TABLET ORAL at 20:11

## 2020-11-24 RX ADMIN — CELECOXIB 100 MILLIGRAM(S): 200 CAPSULE ORAL at 12:10

## 2020-11-24 RX ADMIN — Medication 975 MILLIGRAM(S): at 05:09

## 2020-11-24 RX ADMIN — Medication 325 MILLIGRAM(S): at 17:08

## 2020-11-24 RX ADMIN — ESCITALOPRAM OXALATE 10 MILLIGRAM(S): 10 TABLET, FILM COATED ORAL at 11:41

## 2020-11-24 RX ADMIN — Medication 325 MILLIGRAM(S): at 05:09

## 2020-11-24 RX ADMIN — LORATADINE 10 MILLIGRAM(S): 10 TABLET ORAL at 11:40

## 2020-11-24 RX ADMIN — Medication 5 MILLIGRAM(S): at 21:34

## 2020-11-24 RX ADMIN — BENZOCAINE AND MENTHOL 1 LOZENGE: 5; 1 LIQUID ORAL at 05:09

## 2020-11-24 RX ADMIN — Medication 975 MILLIGRAM(S): at 21:34

## 2020-11-24 RX ADMIN — TRAMADOL HYDROCHLORIDE 50 MILLIGRAM(S): 50 TABLET ORAL at 20:41

## 2020-11-24 RX ADMIN — SODIUM CHLORIDE 75 MILLILITER(S): 9 INJECTION INTRAMUSCULAR; INTRAVENOUS; SUBCUTANEOUS at 15:45

## 2020-11-24 RX ADMIN — CELECOXIB 100 MILLIGRAM(S): 200 CAPSULE ORAL at 11:40

## 2020-11-24 RX ADMIN — Medication 100 MILLIGRAM(S): at 05:09

## 2020-11-24 RX ADMIN — PANTOPRAZOLE SODIUM 40 MILLIGRAM(S): 20 TABLET, DELAYED RELEASE ORAL at 05:09

## 2020-11-24 RX ADMIN — SENNA PLUS 2 TABLET(S): 8.6 TABLET ORAL at 21:33

## 2020-11-24 RX ADMIN — Medication 1 SPRAY(S): at 20:11

## 2020-11-24 RX ADMIN — DONEPEZIL HYDROCHLORIDE 5 MILLIGRAM(S): 10 TABLET, FILM COATED ORAL at 21:34

## 2020-11-24 NOTE — OCCUPATIONAL THERAPY INITIAL EVALUATION ADULT - PERTINENT HX OF CURRENT PROBLEM, REHAB EVAL
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 See below

## 2020-11-24 NOTE — PHYSICAL THERAPY INITIAL EVALUATION ADULT - ADDITIONAL COMMENTS
PTA pt required assistance with functional mobility and ADLs, ambulated with a RW and Raul AFOs. Pt will being staying at her daughters upon D/C with chair lift no steps to negotiate.

## 2020-11-24 NOTE — SWALLOW BEDSIDE ASSESSMENT ADULT - SWALLOW EVAL: PATIENT/FAMILY GOALS STATEMENT
Per Pt's daughter, Claudia, Pt recently had clinical swallow evaluation as outpatient (about a week ago), with plan for f/u instrumental swallow evaluation in ~1 month. Pt currently reports sensation of pharyngeal retention when eating and drinking. Pt's daughter reports increasing difficulty swallowing in recent months and a prior episode of choking with chewable food; reportedly family was concerned they would have to use Heimlich maneuver, but did not need to, in the end. Daughter also reports Pt was started on omeprazole for GERD, with no significant improvement, prior to referral for clinical swallow evaluation.

## 2020-11-24 NOTE — SWALLOW BEDSIDE ASSESSMENT ADULT - MODE OF PRESENTATION
spoon/fed by clinician/Pt unable to self-feed due to R arm fracture/immobilization, and baseline weakness of L arm./cup fed by clinician/cup self fed

## 2020-11-24 NOTE — SWALLOW BEDSIDE ASSESSMENT ADULT - SLP PERTINENT HISTORY OF CURRENT PROBLEM
79yo Female Charcot-Kassidy-Tooth with associated muscular dystrophy (amb w/ RW and b/l AFO braces for bilateral foot drop), chronic back pain 2/2 chronic vertebral fracture (Dx 2012), hx of PE s/p short term A/C, GERD, chronic cough w/ recent diagnosis of dysphagia on thickened liquids, recent RLE edema w/ neg DVT study, c/o R wrist pain s/p mechanical fall. Patient denies head hit or LOC. Patient denies numbness or tingling in the RUE. Daughter reports that the patient has bilateral thenar and hypothenar wasting at baseline. Patient denies any other injuries. 11/23: s/p closed reduction and splinting of Grade I Guistilo open right distal radius fracture

## 2020-11-24 NOTE — SWALLOW BEDSIDE ASSESSMENT ADULT - ASR SWALLOW ASPIRATION MONITOR
cough/Monitor for s/s aspiration/laryngeal penetration. If noted:  D/C p.o. intake, provide non-oral nutrition/hydration/meds, and contact this service @ [phone]/fever/change of breathing pattern/throat clearing/gurgly voice/pneumonia/upper respiratory infection

## 2020-11-24 NOTE — DISCHARGE NOTE NURSING/CASE MANAGEMENT/SOCIAL WORK - PATIENT PORTAL LINK FT
You can access the FollowMyHealth Patient Portal offered by [facility] by registering at the following website: [email or web address]. By joining [employer]’s FollowMyHealth portal, you will also be able to view your health information using other applications (apps) compatible with our system.

## 2020-11-24 NOTE — PHYSICAL THERAPY INITIAL EVALUATION ADULT - PERTINENT HX OF CURRENT PROBLEM, REHAB EVAL
Pt is a 80yF Charcot-Kassidy-Tooth with associated muscular dystrophy, chronic back pain 2/2 chronic vertebral fracture (Dx 2012), hx of PE s/p short term A/C, GERD, chronic cough w/ recent diagnosis of dysphagia on thickened liquids, recent RLE edema w/ neg DVT study, c/o R wrist pain s/p mechanical fall. Pt denies head hit or LOC. Daughter reports that the pt has bilateral thenar and hypothenar wasting at baseline. Pt is now s/p R Wrist ORIF.

## 2020-11-24 NOTE — OCCUPATIONAL THERAPY INITIAL EVALUATION ADULT - RANGE OF MOTION EXAMINATION, LOWER EXTREMITY
bilateral LE Active ROM was WFL  (within functional limits)/except B ankles AAROM WFL, pt wears B AFOs

## 2020-11-24 NOTE — SWALLOW BEDSIDE ASSESSMENT ADULT - SLP GENERAL OBSERVATIONS
Pt seen at bedside, awake and alert, grossly oriented, verbally responsive, following directions for the purposes of the exam, forgetful. Pt with frequent baseline throat clearing prior to introduction of PO trials.

## 2020-11-24 NOTE — SWALLOW BEDSIDE ASSESSMENT ADULT - PHARYNGEAL PHASE
Decreased laryngeal elevation/Multiple swallows/Complaints of pharyngeal stasis/Swallow x2-3; no overt s/s laryngeal penetration/aspiration Decreased laryngeal elevation/Swallow x2-3; no overt s/s laryngeal penetration/aspiration/Throat clear post oral intake/Cough post oral intake/Multiple swallows Decreased laryngeal elevation/palpable hyolaryngeal elevation/excursion; no overt s/s laryngeal penetration/aspiration

## 2020-11-24 NOTE — PROGRESS NOTE ADULT - SUBJECTIVE AND OBJECTIVE BOX
Orthopedic Surgery Progress Note    S: No acute events overnight, pain is well controlled.  No numbness/tingling, chest pain, shortness of breath, light-headedness.    O:  Vital Signs Last 24 Hrs  T(C): 36.6 (24 Nov 2020 05:58), Max: 36.9 (23 Nov 2020 18:41)  T(F): 97.8 (24 Nov 2020 05:58), Max: 98.5 (23 Nov 2020 18:41)  HR: 69 (24 Nov 2020 05:58) (69 - 96)  BP: 126/78 (24 Nov 2020 05:58) (107/67 - 139/79)  BP(mean): 100 (23 Nov 2020 17:45) (100 - 100)  RR: 16 (24 Nov 2020 05:58) (12 - 17)  SpO2: 97% (24 Nov 2020 05:58) (92% - 97%)    Gen: Alert and oriented x3, NAD, patient resting comfortably in bed  RUE:  Dressing C/D/I  +motor intact M/R/AIN/PIN/U  SILT M/U/R  cap refill <2s distal fingertips    Labs:                        13.7   7.27  )-----------( 180      ( 23 Nov 2020 15:46 )             41.7                         13.4   6.95  )-----------( 178      ( 23 Nov 2020 04:22 )             39.6       11-23    139  |  105  |  9   ----------------------------<  147<H>  3.5   |  22  |  0.52        PT/INR - ( 23 Nov 2020 04:22 )   PT: 12.7 sec;   INR: 1.06 ratio         PTT - ( 23 Nov 2020 04:22 )  PTT:28.1 sec    A/P 80y year old female s/p ORIF R grade 1 open distal radius fracture, POD1    Pain Control  Ancef x24 postop  DVT PPX- aspirin bid  PT/OT/OOB  Incentive spirometry encouraged  FU AM labs  WBAT RUE on walker for ambulation  ROM as tolerated RUE  Dispo Planning- FU PT eval

## 2020-11-24 NOTE — OCCUPATIONAL THERAPY INITIAL EVALUATION ADULT - PRECAUTIONS/LIMITATIONS, REHAB EVAL
Patient denies head hit or LOC. Patient denies numbness or tingling in the RUE. Daughter reports that the patient has bilateral thenar and hypothenar wasting at baseline. Patient denies any other injuries.

## 2020-11-24 NOTE — OCCUPATIONAL THERAPY INITIAL EVALUATION ADULT - ADDITIONAL COMMENTS
Remigio SANTANA Wrist: Acute dorsally displaced possibly intra-articular fracture of the distal radius. Acute dorsally displaced intra-articular fracture of the distal ulna. Surrounding soft tissue swelling.

## 2020-11-24 NOTE — PROGRESS NOTE ADULT - PROBLEM SELECTOR PLAN 1
stable s/p ORIF distal radius fracture  physical therapy as tolerated  pain meds as needed  DVT and GI prophylaxis

## 2020-11-24 NOTE — PROGRESS NOTE ADULT - PROBLEM SELECTOR PLAN 3
Patient s/p speech and swallow eval  suggested Dysphagia I with nectar thickened liquids. Crush meds or provide via alternate source.

## 2020-11-24 NOTE — OCCUPATIONAL THERAPY INITIAL EVALUATION ADULT - LIVES WITH, PROFILE
Pt lives with daughter in private home with stair lift, tub in bathroom with seat. Pt requires assist from HHA and daughter for ADLs, ambulates with RW and requires B AFOs

## 2020-11-24 NOTE — SWALLOW BEDSIDE ASSESSMENT ADULT - SWALLOW EVAL: DIAGNOSIS
79 yo female with h/o Charcot-Kassidy-Tooth with associated muscular dystrophy, and undergoing dysphagia w/u as outpatient. Pt currently here s/p mechanical fall with open right distal radius fx, now s/p closed reduction and splinting. Pt currently presents with evidence of oropharyngeal dysphagia notable for prolonged mastication of chewable solids, c/o sensation of pharyngeal retention and s/s laryngeal penetration/aspiration with thin liquids.

## 2020-11-24 NOTE — SWALLOW BEDSIDE ASSESSMENT ADULT - COMMENTS
Swallow Hx: MBS in 2013: Pt presents with mild oropharyngeal dysphagia characterized by increased oral transit time, prolonged mastication for solids, decreased A-P transfer resulting in mild oral residue with solids-pt independently clears residue with use of secondary swallow, delayed pharyngeal swallow trigger and episodic trace silent laryngeal penetration with thin liquids with full retrieval. Pt swallow skills deemed functional for regular diet texture.

## 2020-11-24 NOTE — SWALLOW BEDSIDE ASSESSMENT ADULT - SWALLOW EVAL: RECOMMENDED FEEDING/EATING TECHNIQUES
maintain upright posture during/after eating for 30 mins/oral hygiene/crush medication (when feasible)/no straws/position upright (90 degrees)/small sips/bites

## 2020-11-24 NOTE — OCCUPATIONAL THERAPY INITIAL EVALUATION ADULT - BALANCE TRAINING, PT EVAL
GOAL: Pt will demonstrate improved static/dynamic balance to fair + in order to increase safety and independence in ADLs within 4 weeks

## 2020-11-24 NOTE — PHYSICAL THERAPY INITIAL EVALUATION ADULT - ACTIVE RANGE OF MOTION EXAMINATION, REHAB EVAL
GURMEET hips/knee WFL, Gurmeet foot drop full passive ankle ROM, GURMEET shoulder/elbow WFL wrist/digits limited Gurmeet

## 2020-11-24 NOTE — OCCUPATIONAL THERAPY INITIAL EVALUATION ADULT - ANTICIPATED DISCHARGE DISPOSITION, OT EVAL
for ADLs and safety assessment in home environment. Assist with ADLs from HHA and daughter, pt owns shower chair, RW/home w/ OT

## 2020-11-24 NOTE — PROGRESS NOTE ADULT - SUBJECTIVE AND OBJECTIVE BOX
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 Patient denies head hit or LOC. Patient denies numbness or tingling in the RUE. Daughter reports that the patient has bilateral thenar and hypothenar wasting at baseline. Patient denies any other injuries. Patient tolerated procedure well.         MEDICATIONS  (STANDING):  acetaminophen   Tablet .. 975 milliGRAM(s) Oral every 8 hours  aspirin 325 milliGRAM(s) Oral two times a day  celecoxib 100 milliGRAM(s) Oral daily  donepezil 5 milliGRAM(s) Oral at bedtime  DULoxetine 30 milliGRAM(s) Oral daily  escitalopram 10 milliGRAM(s) Oral daily  loratadine 10 milliGRAM(s) Oral daily  melatonin 5 milliGRAM(s) Oral at bedtime  pantoprazole    Tablet 40 milliGRAM(s) Oral before breakfast  senna 2 Tablet(s) Oral at bedtime  sodium chloride 0.9%. 1000 milliLiter(s) (75 mL/Hr) IV Continuous <Continuous>    MEDICATIONS  (PRN):  benzocaine 15 mG/menthol 3.6 mG (Sugar-Free) Lozenge 1 Lozenge Oral four times a day PRN Sore Throat  fluticasone propionate 50 MICROgram(s)/spray Nasal Spray 1 Spray(s) Both Nostrils once PRN nasal congestion  ondansetron Injectable 4 milliGRAM(s) IV Push every 6 hours PRN Nausea and/or Vomiting  oxyCODONE    IR 5 milliGRAM(s) Oral every 6 hours PRN Severe Pain (7 - 10)  traMADol 25 milliGRAM(s) Oral every 4 hours PRN Mild Pain (1 - 3)  traMADol 50 milliGRAM(s) Oral every 8 hours PRN Moderate Pain (4 - 6)          VITALS:   T(C): 36.9 (11-24-20 @ 16:48), Max: 36.9 (11-23-20 @ 18:41)  HR: 83 (11-24-20 @ 16:48) (69 - 89)  BP: 112/58 (11-24-20 @ 16:48) (105/68 - 139/75)  RR: 17 (11-24-20 @ 16:48) (14 - 17)  SpO2: 97% (11-24-20 @ 16:48) (93% - 97%)  Wt(kg): --      PHYSICAL EXAM:  GENERAL: NAD, well nourished and conversant  HEAD:  Atraumatic  EYES: EOM, PERRLA, conjunctiva pink and sclera white  ENT: No tonsillar erythema, exudates, or enlargement, moist mucous membranes, good dentition, no lesions  NECK: Supple, No JVD, normal thyroid, carotids with normal upstrokes and no bruits  CHEST/LUNG: Clear to auscultation bilaterally, No rales, rhonchi, wheezing, or rubs  HEART: Regular rate and rhythm, No murmurs, rubs, or gallops  ABDOMEN: Soft, nondistended, no masses, guarding, tenderness or rebound, bowel sounds present  EXTREMITIES:  2+ Peripheral Pulses,   ORIF rigt distal r  LYMPH: No lymphadenopathy noted  SKIN: No rashes or lesions  NERVOUS SYSTEM:  Alert & Oriented   LABS:        CBC Full  -  ( 24 Nov 2020 10:02 )  WBC Count : 9.09 K/uL  RBC Count : 4.18 M/uL  Hemoglobin : 13.0 g/dL  Hematocrit : 39.7 %  Platelet Count - Automated : 143 K/uL  Mean Cell Volume : 95.0 fl  Mean Cell Hemoglobin : 31.1 pg  Mean Cell Hemoglobin Concentration : 32.7 gm/dL  Auto Neutrophil # : 6.36 K/uL  Auto Lymphocyte # : 2.09 K/uL  Auto Monocyte # : 0.64 K/uL  Auto Eosinophil # : 0.00 K/uL  Auto Basophil # : 0.00 K/uL  Auto Neutrophil % : 70.0 %  Auto Lymphocyte % : 23.0 %  Auto Monocyte % : 7.0 %  Auto Eosinophil % : 0.0 %  Auto Basophil % : 0.0 %    11-24    139  |  103  |  14  ----------------------------<  99  3.6   |  24  |  0.70    Ca    8.8      24 Nov 2020 09:59    TPro  7.4  /  Alb  4.5  /  TBili  0.5  /  DBili  x   /  AST  75<H>  /  ALT  30  /  AlkPhos  104  11-22    LIVER FUNCTIONS - ( 22 Nov 2020 19:53 )  Alb: 4.5 g/dL / Pro: 7.4 g/dL / ALK PHOS: 104 U/L / ALT: 30 U/L / AST: 75 U/L / GGT: x           PT/INR - ( 23 Nov 2020 04:22 )   PT: 12.7 sec;   INR: 1.06 ratio         PTT - ( 23 Nov 2020 04:22 )  PTT:28.1 sec    CAPILLARY BLOOD GLUCOSE          RADIOLOGY & ADDITIONAL TESTS:

## 2020-11-25 ENCOUNTER — TRANSCRIPTION ENCOUNTER (OUTPATIENT)
Age: 80
End: 2020-11-25

## 2020-11-25 VITALS
SYSTOLIC BLOOD PRESSURE: 95 MMHG | RESPIRATION RATE: 17 BRPM | TEMPERATURE: 98 F | OXYGEN SATURATION: 93 % | HEART RATE: 86 BPM | DIASTOLIC BLOOD PRESSURE: 59 MMHG

## 2020-11-25 PROCEDURE — 97162 PT EVAL MOD COMPLEX 30 MIN: CPT

## 2020-11-25 PROCEDURE — 74230 X-RAY XM SWLNG FUNCJ C+: CPT

## 2020-11-25 PROCEDURE — 86901 BLOOD TYPING SEROLOGIC RH(D): CPT

## 2020-11-25 PROCEDURE — 85027 COMPLETE CBC AUTOMATED: CPT

## 2020-11-25 PROCEDURE — 96375 TX/PRO/DX INJ NEW DRUG ADDON: CPT

## 2020-11-25 PROCEDURE — 85610 PROTHROMBIN TIME: CPT

## 2020-11-25 PROCEDURE — 80053 COMPREHEN METABOLIC PANEL: CPT

## 2020-11-25 PROCEDURE — 71045 X-RAY EXAM CHEST 1 VIEW: CPT

## 2020-11-25 PROCEDURE — 85730 THROMBOPLASTIN TIME PARTIAL: CPT

## 2020-11-25 PROCEDURE — 85025 COMPLETE CBC W/AUTO DIFF WBC: CPT

## 2020-11-25 PROCEDURE — 94640 AIRWAY INHALATION TREATMENT: CPT

## 2020-11-25 PROCEDURE — 90715 TDAP VACCINE 7 YRS/> IM: CPT

## 2020-11-25 PROCEDURE — 86850 RBC ANTIBODY SCREEN: CPT

## 2020-11-25 PROCEDURE — 73110 X-RAY EXAM OF WRIST: CPT

## 2020-11-25 PROCEDURE — 97165 OT EVAL LOW COMPLEX 30 MIN: CPT

## 2020-11-25 PROCEDURE — 97116 GAIT TRAINING THERAPY: CPT

## 2020-11-25 PROCEDURE — 97530 THERAPEUTIC ACTIVITIES: CPT

## 2020-11-25 PROCEDURE — 80048 BASIC METABOLIC PNL TOTAL CA: CPT

## 2020-11-25 PROCEDURE — 86900 BLOOD TYPING SEROLOGIC ABO: CPT

## 2020-11-25 PROCEDURE — C9399: CPT

## 2020-11-25 PROCEDURE — 76000 FLUOROSCOPY <1 HR PHYS/QHP: CPT

## 2020-11-25 PROCEDURE — 99285 EMERGENCY DEPT VISIT HI MDM: CPT | Mod: 25

## 2020-11-25 PROCEDURE — C1889: CPT

## 2020-11-25 PROCEDURE — 93005 ELECTROCARDIOGRAM TRACING: CPT

## 2020-11-25 PROCEDURE — 86769 SARS-COV-2 COVID-19 ANTIBODY: CPT

## 2020-11-25 PROCEDURE — U0003: CPT

## 2020-11-25 PROCEDURE — C1713: CPT

## 2020-11-25 PROCEDURE — 74230 X-RAY XM SWLNG FUNCJ C+: CPT | Mod: 26

## 2020-11-25 PROCEDURE — 96374 THER/PROPH/DIAG INJ IV PUSH: CPT

## 2020-11-25 PROCEDURE — 90471 IMMUNIZATION ADMIN: CPT

## 2020-11-25 PROCEDURE — 73090 X-RAY EXAM OF FOREARM: CPT

## 2020-11-25 RX ORDER — SENNA PLUS 8.6 MG/1
2 TABLET ORAL
Qty: 0 | Refills: 0 | DISCHARGE
Start: 2020-11-25

## 2020-11-25 RX ORDER — DULOXETINE HYDROCHLORIDE 30 MG/1
0 CAPSULE, DELAYED RELEASE ORAL
Qty: 0 | Refills: 0 | DISCHARGE

## 2020-11-25 RX ORDER — MELOXICAM 15 MG/1
0 TABLET ORAL
Qty: 0 | Refills: 0 | DISCHARGE

## 2020-11-25 RX ORDER — ASPIRIN/CALCIUM CARB/MAGNESIUM 324 MG
1 TABLET ORAL
Qty: 84 | Refills: 0
Start: 2020-11-25 | End: 2021-01-05

## 2020-11-25 RX ORDER — ACETAMINOPHEN 500 MG
3 TABLET ORAL
Qty: 0 | Refills: 0 | DISCHARGE
Start: 2020-11-25

## 2020-11-25 RX ORDER — OXYCODONE HYDROCHLORIDE 5 MG/1
1 TABLET ORAL
Qty: 20 | Refills: 0
Start: 2020-11-25

## 2020-11-25 RX ADMIN — Medication 975 MILLIGRAM(S): at 05:25

## 2020-11-25 RX ADMIN — PANTOPRAZOLE SODIUM 40 MILLIGRAM(S): 20 TABLET, DELAYED RELEASE ORAL at 05:26

## 2020-11-25 RX ADMIN — DULOXETINE HYDROCHLORIDE 30 MILLIGRAM(S): 30 CAPSULE, DELAYED RELEASE ORAL at 12:07

## 2020-11-25 RX ADMIN — Medication 975 MILLIGRAM(S): at 15:06

## 2020-11-25 RX ADMIN — Medication 325 MILLIGRAM(S): at 05:25

## 2020-11-25 RX ADMIN — ESCITALOPRAM OXALATE 10 MILLIGRAM(S): 10 TABLET, FILM COATED ORAL at 12:07

## 2020-11-25 RX ADMIN — CELECOXIB 100 MILLIGRAM(S): 200 CAPSULE ORAL at 12:40

## 2020-11-25 RX ADMIN — CELECOXIB 100 MILLIGRAM(S): 200 CAPSULE ORAL at 12:07

## 2020-11-25 RX ADMIN — LORATADINE 10 MILLIGRAM(S): 10 TABLET ORAL at 12:07

## 2020-11-25 RX ADMIN — Medication 975 MILLIGRAM(S): at 14:26

## 2020-11-25 NOTE — SWALLOW VFSS/MBS ASSESSMENT ADULT - ORAL PHASE
+ Tongue pumping; max spillover to the valleculae; trace to mild lingual residue/Delayed oral transit time/Reduced anterior - posterior transport Delayed oral transit time/Reduced anterior - posterior transport/+ Tongue pumping; max spillover to the valleculae, up to mod spillover to the pyriform sinuses; laryngeal penetration over the arytenoids from spillover, appears mostly cleared; trace lingual residue max uncontrolled spillover to the valleculae, up to max spillover to the pyriform sinuses Reduced anterior - posterior transport/trace to mild oral residue/Delayed oral transit time

## 2020-11-25 NOTE — PROGRESS NOTE ADULT - ATTENDING COMMENTS
Pt seen and examined.  Exam and plan as above. Complete 24h of post op iv abx and dc home today
Pt seen and examined.  Exam and plan as above
Pt seen and examined.  Exam and plan as above. For I and D and ORIF.  R/B/A discussed

## 2020-11-25 NOTE — DISCHARGE NOTE PROVIDER - NSDCFUADDINST_GEN_ALL_CORE_FT
Please follow up with your doctor 14 days after your discharge from the hospital (call for appointment).  PT-WBAT RUE on walker for ambulation, ROM as tolerated RUE.   Aspirin 325 twice daily x 6 weeks total for dvt prevention.  Keep dressing clean and intact, have doctor remove staples/sutures post op day 14 (if applicable) and apply steristrips.  Please follow up with your PMD within 1 month for routine checkup.

## 2020-11-25 NOTE — DIETITIAN INITIAL EVALUATION ADULT. - ADD RECOMMEND
1.) Continue with current dysphagia-3 soft; nectar consistency liquid per SLP recommendation 2.) Continue providing assistance with meals 3.) Encourage PO intake of meals and snacks with emphasis on protein intake. 4.) Monitor for needs for oral nutrition supplement 5.) Monitor pt's PO intake, weight, skin, edema, GI distress 1.) Continue diet free of therapeutic restrictions; continue with current dysphagia-3 soft; nectar consistency liquid per SLP recommendation 2.) Continue providing assistance with meals 3.) Encourage PO intake of meals and snacks with emphasis on protein intake. 4.) Monitor for needs for oral nutrition supplement 5.) Monitor pt's PO intake, weight, skin, edema, GI distress

## 2020-11-25 NOTE — DIETITIAN INITIAL EVALUATION ADULT. - REASON
Nutrition focused physical exam not warranted at this time. Upon visual assessment pt without any overt signs of muscle wasting/fat loss

## 2020-11-25 NOTE — DISCHARGE NOTE PROVIDER - NSDCACTIVITY_GEN_ALL_CORE
Walking - Outdoors allowed/Do not drive or operate machinery/Do not make important decisions/Stairs allowed/Walking - Indoors allowed/Showering allowed/No heavy lifting/straining

## 2020-11-25 NOTE — DISCHARGE NOTE PROVIDER - CARE PROVIDERS DIRECT ADDRESSES
[email or web address]

## 2020-11-25 NOTE — DIETITIAN INITIAL EVALUATION ADULT. - REASON INDICATOR FOR ASSESSMENT
Nutrition consult   Source: EMR, Pt, Pt daughter at bedside Nutrition consult   Source: EMR, Pt, Pt daughter at bedside  Per chart pt primarily Romanian speaking however per daughter pt can speak english and Romanian. Dietetic Intern Romanian speaking.

## 2020-11-25 NOTE — SWALLOW VFSS/MBS ASSESSMENT ADULT - ROSENBEK'S PENETRATION ASPIRATION SCALE
(1) no aspiration, contrast does not enter airway (4) contrast contacts vocal cords, no residue remains (penetration) (5) contrast contacts vocal cords, visible residue remains (penetration)

## 2020-11-25 NOTE — DISCHARGE NOTE PROVIDER - NSDCCPCAREPLAN_GEN_ALL_CORE_FT
PRINCIPAL DISCHARGE DIAGNOSIS  Diagnosis: Other type I or II open intra-articular fracture of distal end of right radius, initial encounter  Assessment and Plan of Treatment:

## 2020-11-25 NOTE — PROGRESS NOTE ADULT - REASON FOR ADMISSION
open right distal radius fracture

## 2020-11-25 NOTE — SWALLOW VFSS/MBS ASSESSMENT ADULT - NS SWALLOW VFSS REC ASPIR MON
change of breathing pattern/fever/gurgly voice/pneumonia/throat clearing/upper respiratory infection/cough throat clearing/change of breathing pattern/gurgly voice/upper respiratory infection/cough/Monitor for s/s aspiration/laryngeal penetration. If noted:  D/C p.o. intake, provide non-oral nutrition/hydration/meds, and contact this service @ [phone]/fever/pneumonia

## 2020-11-25 NOTE — DIETITIAN INITIAL EVALUATION ADULT. - CHIEF COMPLAINT
The patient is a 80y Female with dysphagia, PE, GERD, osteoporosis, muscular dystrophy. Presents with open right distal radius fracture. s/p closed reduction/splinting/ORIF 11/23.

## 2020-11-25 NOTE — PROGRESS NOTE ADULT - SUBJECTIVE AND OBJECTIVE BOX
Patient is a 80y old  Female who presents with a chief complaint of open right distal radius fracture (25 Nov 2020 07:03)        MEDICATIONS  (STANDING):  acetaminophen   Tablet .. 975 milliGRAM(s) Oral every 8 hours  aspirin 325 milliGRAM(s) Oral two times a day  celecoxib 100 milliGRAM(s) Oral daily  donepezil 5 milliGRAM(s) Oral at bedtime  DULoxetine 30 milliGRAM(s) Oral daily  escitalopram 10 milliGRAM(s) Oral daily  loratadine 10 milliGRAM(s) Oral daily  melatonin 5 milliGRAM(s) Oral at bedtime  pantoprazole    Tablet 40 milliGRAM(s) Oral before breakfast  senna 2 Tablet(s) Oral at bedtime  sodium chloride 0.9%. 1000 milliLiter(s) (75 mL/Hr) IV Continuous <Continuous>    MEDICATIONS  (PRN):  benzocaine 15 mG/menthol 3.6 mG (Sugar-Free) Lozenge 1 Lozenge Oral four times a day PRN Sore Throat  ondansetron Injectable 4 milliGRAM(s) IV Push every 6 hours PRN Nausea and/or Vomiting  oxyCODONE    IR 5 milliGRAM(s) Oral every 6 hours PRN Severe Pain (7 - 10)  traMADol 25 milliGRAM(s) Oral every 4 hours PRN Mild Pain (1 - 3)  traMADol 50 milliGRAM(s) Oral every 8 hours PRN Moderate Pain (4 - 6)          VITALS:   T(C): 36.4 (11-25-20 @ 08:20), Max: 37.1 (11-25-20 @ 00:27)  HR: 75 (11-25-20 @ 08:20) (71 - 87)  BP: 105/66 (11-25-20 @ 08:20) (101/62 - 112/58)  RR: 17 (11-25-20 @ 08:20) (17 - 17)  SpO2: 94% (11-25-20 @ 08:20) (94% - 97%)  Wt(kg): --        LABS:        CBC Full  -  ( 24 Nov 2020 10:02 )  WBC Count : 9.09 K/uL  RBC Count : 4.18 M/uL  Hemoglobin : 13.0 g/dL  Hematocrit : 39.7 %  Platelet Count - Automated : 143 K/uL  Mean Cell Volume : 95.0 fl  Mean Cell Hemoglobin : 31.1 pg  Mean Cell Hemoglobin Concentration : 32.7 gm/dL  Auto Neutrophil # : 6.36 K/uL  Auto Lymphocyte # : 2.09 K/uL  Auto Monocyte # : 0.64 K/uL  Auto Eosinophil # : 0.00 K/uL  Auto Basophil # : 0.00 K/uL  Auto Neutrophil % : 70.0 %  Auto Lymphocyte % : 23.0 %  Auto Monocyte % : 7.0 %  Auto Eosinophil % : 0.0 %  Auto Basophil % : 0.0 %    11-24    139  |  103  |  14  ----------------------------<  99  3.6   |  24  |  0.70    Ca    8.8      24 Nov 2020 09:59            CAPILLARY BLOOD GLUCOSE          RADIOLOGY & ADDITIONAL TESTS:

## 2020-11-25 NOTE — PROGRESS NOTE ADULT - SUBJECTIVE AND OBJECTIVE BOX
ORTHO  Patient is a 80y old  Female who presents with a chief complaint of open right distal radius fracture (24 Nov 2020 16:58)    Pt. resting without complaint    VS-  T(C): 36.5 (11-25-20 @ 05:10), Max: 37.1 (11-25-20 @ 00:27)  HR: 71 (11-25-20 @ 05:10) (70 - 87)  BP: 101/62 (11-25-20 @ 05:10) (101/62 - 126/69)  RR: 17 (11-25-20 @ 05:10) (16 - 17)  SpO2: 94% (11-25-20 @ 05:10) (94% - 97%)  Wt(kg): --    M.S. A&O  Extremity- Right UE- Posey block for elevation, dressing- C/D/I                          DIP flexed, min motion PIP                               13.0   9.09  )-----------( 143      ( 24 Nov 2020 10:02 )             39.7     11-24    139  |  103  |  14  ----------------------------<  99  3.6   |  24  |  0.70    Ca    8.8      24 Nov 2020 09:59

## 2020-11-25 NOTE — SWALLOW VFSS/MBS ASSESSMENT ADULT - RECOMMENDED FEEDING/EATING TECHNIQUES
oral hygiene/small sips/bites/maintain upright posture during/after eating for 30 mins/no straws/position upright (90 degrees)

## 2020-11-25 NOTE — SWALLOW VFSS/MBS ASSESSMENT ADULT - RECOMMENDED CONSISTENCY
Dysphagia III with nectar thick liquids VIA SMALL SINGLE SIPS   MD/team Please enter the following as notes to RN: 1) Full assist with meals, 2) Crush meds or provide via alternate source, 3) ALL PO via teaspoon, 4) Provide small single bites and sips at slow rate, 5) Encourage successive swallows for oral and pharyngeal clearance, 6) Alternate food and liquids to aid in oral and pharyngeal clearance, 7) Aspiration precautions. Monitor for s/s aspiration/laryngeal penetration. If noted:  D/C p.o. intake, provide non-oral nutrition/hydration/meds

## 2020-11-25 NOTE — DIETITIAN INITIAL EVALUATION ADULT. - OTHER INFO
Diet: Diet, Dysphagia 3 Soft-Nectar Consistency Fluid   Pt reports good appetite/PO intake during admission. SLP evaluation done on 11/25 and recommended Dysphagia 3 Soft-Nectar Consistency Fluid with small sips and total assistance with meals. Pt reports she is receiving assistance with all meals in house.   Pt denies any N/V/diarrhea. Note on Zofran. Endorses some constipation however reports is baseline and takes Benefiber at home. Note pt on bowel regimen in house. Last BM 11/22.  Pt with current dosing wt of 151.8 on 11/23. Per daughter, pt has gained about 20lbs in the last year due to more sedentary lifestyle and increased intake. Per HIE, pt noted with 145lb wt in 9/2020 and 138lb wt in 1/2020.    Provided education on the importance of energy and protein intake for wound healing. Pt and daughter verbalized understanding and seem likely to adhere to diet recommendations. Diet: Diet, Dysphagia 3 Soft-Nectar Consistency Fluid   Pt reports good appetite/PO intake during admission. SLP evaluation done on 11/24 and recommended Dysphagia I with nectar thickened liquids however repeat evaluation done 11/25 and recommended Dysphagia 3 Soft-Nectar Consistency Fluid with small sips and total assistance with meals. Pt reports she is receiving assistance with all meals in house.   Pt denies any N/V/diarrhea. Note on Zofran. Endorses some constipation however reports is baseline and takes Benefiber at home. Note pt on bowel regimen in house. Last BM 11/22.  Pt with current dosing wt of 151.8 on 11/23. Per daughter, pt has gained about 20lbs in the last year due to more sedentary lifestyle and increased intake. Per HIE, pt noted with 145lb wt in 9/2020 and 138lb wt in 1/2020.    Provided education on the importance of energy and protein intake for wound healing. Pt and daughter verbalized understanding and seem likely to adhere to diet recommendations.

## 2020-11-25 NOTE — DIETITIAN INITIAL EVALUATION ADULT. - ORAL INTAKE PTA/DIET HISTORY
Per pt and daughter, good appetite/PO intake PTA with 3 meals per day. Takes calcium, magnesium, and Benefiber at home. Endorses NKFA. Per pt and daughter, good appetite/PO intake PTA with 3 meals per day. Pt lives with daughter who prepares all meals for her. Takes calcium, magnesium, and Benefiber at home. Endorses NKFA.

## 2020-11-25 NOTE — DISCHARGE NOTE PROVIDER - HOSPITAL COURSE
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 Patient denies head hit or LOC. Patient denies numbness or tingling in the RUE. Daughter reports that the patient has bilateral thenar and hypothenar wasting at baseline. Patient denies any other injuries.    PMH:  Dysphagia  Pulmonary embolism  GERD (gastroesophageal reflux disease)  Osteoporosis  Muscular dystrophy    PSH:  Tendon disorder, s/p Achilles lengthening >40 years ago  No significant past surgical history  NO SIGNIFICANT PAST SURGICAL HISTORY    This is a 80 year old Female admitted to [facility] on 11/22/20 with an open distal radius fracture.  Patient evaluated and cleared by Medicine for operative procedure.  On 11/23, patient underwent an uncomplicated debridement of fracture with ORIF of distal radius and closure.  Evaluated and treated by PT, recommended for Home.  Speech Therapy performed Modified Barium Swallow and recommendations followed.  Remain of hospital stay unremarkable, and patient discharged home when PT cleared.

## 2020-11-25 NOTE — SWALLOW VFSS/MBS ASSESSMENT ADULT - LARYNGEAL PENETRATION DURING THE SWALLOW - SILENT
over the arytenoids, appears mostly retrieved, with intermittent trace residual/Trace Trace/over the arytenoids; with incomplete retrieval

## 2020-11-25 NOTE — SWALLOW VFSS/MBS ASSESSMENT ADULT - MODE OF PRESENTATION
spoon/fed by clinician/cup/straw straw/spoon/fed by clinician fed by clinician/cup/straw spoon/fed by clinician

## 2020-11-25 NOTE — SWALLOW VFSS/MBS ASSESSMENT ADULT - DIAGNOSTIC IMPRESSIONS
Pt presents with an oropharyngeal dysphagia notable for prolonged yet efficient mastication for chewable solids, intermittent uncontrolled A-P spillover to the pharynx prior to swallow trigger, delay in pharyngeal swallow trigger, and laryngeal penetration with thin and nectar-thick liquids. Use of single controlled cup sips was effective for improving airway protection with nectar-thick liquids, but not with thin liquids. Of note, Pt with no significant pharyngeal retention, despite c/o baseline globus sensation and c/o sensation of pharyngeal retention post oral intake. Pt presents with an oropharyngeal dysphagia notable for prolonged yet efficient mastication for chewable solids, intermittent uncontrolled A-P spillover to the pharynx prior to swallow trigger, delay in pharyngeal swallow trigger, and laryngeal penetration with thin and nectar-thick liquids. Use of single controlled cup sips was effective for improving airway protection with nectar-thick liquids, but not with thin liquids. Of note, Pt with no significant pharyngeal retention, despite c/o baseline globus sensation and c/o sensation of pharyngeal retention post oral intake. 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 Can consider GI consult if clinically indicated.    Disorders: reduced lingual strength/ROM/Rate of motion, reduced tongue to palate contact, delay in trigger of the swallow reflex, reduced hyo-laryngeal elevation/excursion, reduced laryngeal closure, reduced supraglottic sensation, reduced subglottic sensation.

## 2020-11-25 NOTE — SWALLOW VFSS/MBS ASSESSMENT ADULT - ADDITIONAL INFORMATION
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

## 2020-11-25 NOTE — PROGRESS NOTE ADULT - ASSESSMENT
Impression: Stable       Plan:   Continue present treatment                 Out of bed, ambulate, weight bearing as tolerated                 Physical therapy follow up                 Continue to monitor    Rob Gonzáles PA-C  Orthopaedic Surgery  Team pager [phone]  [phone]

## 2020-11-25 NOTE — DISCHARGE NOTE PROVIDER - NSDCMRMEDTOKEN_GEN_ALL_CORE_FT
acetaminophen 325 mg oral tablet: 3 tab(s) orally every 8 hours  donepezil 5 mg oral tablet: 1 tab(s) orally once a day (at bedtime)  DULoxetine 30 mg oral delayed release capsule: 1 cap(s) orally 2 times a day  Ecotrin 325 mg oral delayed release tablet: 1 tab(s) orally 2 times a day x 6 weeks total for dvt prevention MDD:2  escitalopram 10 mg oral tablet: 1 tab(s) orally once a day  fluticasone 27.5 mcg/inh nasal spray: 1 spray(s) nasal once a day  omeprazole 40 mg oral delayed release capsule: 1 cap(s) orally once a day  oxyCODONE 5 mg oral tablet: 0.5-1 tab orally every 4-6 hours, As needed, Moderate to severe pain. MDD:5  senna oral tablet: 2 tab(s) orally once a day (at bedtime)  ZyrTEC 5 mg oral tablet: 1 tab(s) orally once a day

## 2020-11-25 NOTE — PROGRESS NOTE ADULT - PROBLEM SELECTOR PROBLEM 1
Other type I or II open intra-articular fracture of distal end of right radius, initial encounter
How Severe Is Your Rash?: mild
Is This A New Presentation, Or A Follow-Up?: Rash
Additional History: Patient states she had a similar “rash” in past in the vaginal area and was treated with a topical (name unknown), which did not work. She also said she had the area “ cut off” and it resolved.

## 2020-11-30 PROBLEM — R13.10 DYSPHAGIA, UNSPECIFIED: Chronic | Status: ACTIVE | Noted: 2020-11-22

## 2020-12-09 ENCOUNTER — APPOINTMENT (OUTPATIENT)
Dept: ORTHOPEDIC SURGERY | Facility: CLINIC | Age: 80
End: 2020-12-09
Payer: MEDICARE

## 2020-12-09 ENCOUNTER — APPOINTMENT (OUTPATIENT)
Dept: PHYSICAL MEDICINE AND REHAB | Facility: CLINIC | Age: 80
End: 2020-12-09
Payer: MEDICARE

## 2020-12-09 ENCOUNTER — APPOINTMENT (OUTPATIENT)
Dept: GERIATRICS | Facility: CLINIC | Age: 80
End: 2020-12-09
Payer: MEDICARE

## 2020-12-09 VITALS
WEIGHT: 143.13 LBS | SYSTOLIC BLOOD PRESSURE: 120 MMHG | RESPIRATION RATE: 15 BRPM | OXYGEN SATURATION: 97 % | HEIGHT: 61 IN | TEMPERATURE: 97.5 F | BODY MASS INDEX: 27.02 KG/M2 | DIASTOLIC BLOOD PRESSURE: 72 MMHG | HEART RATE: 80 BPM

## 2020-12-09 VITALS — TEMPERATURE: 97.4 F

## 2020-12-09 VITALS
OXYGEN SATURATION: 97 % | TEMPERATURE: 97.4 F | HEART RATE: 76 BPM | SYSTOLIC BLOOD PRESSURE: 136 MMHG | DIASTOLIC BLOOD PRESSURE: 79 MMHG

## 2020-12-09 DIAGNOSIS — M79.18 MYALGIA, OTHER SITE: ICD-10-CM

## 2020-12-09 DIAGNOSIS — M47.816 SPONDYLOSIS W/OUT MYELOPATHY OR RADICULOPATHY, LUMBAR REGION: ICD-10-CM

## 2020-12-09 DIAGNOSIS — F32.9 MAJOR DEPRESSIVE DISORDER, SINGLE EPISODE, UNSPECIFIED: ICD-10-CM

## 2020-12-09 DIAGNOSIS — M54.5 LOW BACK PAIN: ICD-10-CM

## 2020-12-09 PROCEDURE — 99204 OFFICE O/P NEW MOD 45 MIN: CPT | Mod: 25

## 2020-12-09 PROCEDURE — 99024 POSTOP FOLLOW-UP VISIT: CPT

## 2020-12-09 PROCEDURE — 99214 OFFICE O/P EST MOD 30 MIN: CPT

## 2020-12-09 PROCEDURE — 99495 TRANSJ CARE MGMT MOD F2F 14D: CPT

## 2020-12-09 PROCEDURE — 20552 NJX 1/MLT TRIGGER POINT 1/2: CPT

## 2020-12-09 RX ORDER — ESOMEPRAZOLE MAGNESIUM 40 MG/1
40 CAPSULE, DELAYED RELEASE ORAL
Qty: 30 | Refills: 3 | Status: DISCONTINUED | COMMUNITY
Start: 2020-09-02 | End: 2020-12-09

## 2020-12-09 RX ORDER — TIZANIDINE 2 MG/1
2 TABLET ORAL AT BEDTIME
Qty: 30 | Refills: 0 | Status: ACTIVE | COMMUNITY
Start: 2020-12-09 | End: 1900-01-01

## 2020-12-09 RX ORDER — OXYCODONE 5 MG/1
5 TABLET ORAL
Qty: 8 | Refills: 0 | Status: DISCONTINUED | COMMUNITY
Start: 2020-09-15 | End: 2020-12-09

## 2020-12-09 NOTE — PHYSICAL EXAM
[General Appearance - Alert] : alert [General Appearance - In No Acute Distress] : in no acute distress [General Appearance - Well Nourished] : well nourished [General Appearance - Well Developed] : well developed [General Appearance - Well-Appearing] : healthy appearing [Sclera] : the sclera and conjunctiva were normal [PERRL With Normal Accommodation] : pupils were equal in size, round, and reactive to light [Extraocular Movements] : extraocular movements were intact [Strabismus] : no strabismus was seen [Normal Oral Mucosa] : normal oral mucosa [No Oral Pallor] : no oral pallor [Outer Ear] : the ears and nose were normal in appearance [Both Tympanic Membranes Were Examined] : both tympanic membranes were normal [Neck Appearance] : the appearance of the neck was normal [Neck Cervical Mass (___cm)] : no neck mass was observed [Jugular Venous Distention Increased] : there was no jugular-venous distention [Respiration, Rhythm And Depth] : normal respiratory rhythm and effort [Exaggerated Use Of Accessory Muscles For Inspiration] : no accessory muscle use [Auscultation Breath Sounds / Voice Sounds] : lungs were clear to auscultation bilaterally [Apical Impulse] : the apical impulse was normal [Heart Rate And Rhythm] : heart rate was normal and rhythm regular [Heart Sounds] : normal S1 and S2 [Heart Sounds Gallop] : no gallops [Arterial Pulses Carotid] : carotid pulses were normal with no bruits [Bowel Sounds] : normal bowel sounds [Abdomen Soft] : soft [Abdomen Tenderness] : non-tender [No CVA Tenderness] : no ~M costovertebral angle tenderness [Skin Color & Pigmentation] : normal skin color and pigmentation [] : no rash [No Focal Deficits] : no focal deficits [___/1] : [unfilled]/1   [___/5] : [unfilled]/5 [Dementia] : Dementia [Oriented To Time, Place, And Person] : oriented to person, place, and time [FreeTextEntry1] : LE edema 2+  [TextBox_2] : Yes [TextBox_4] : 3rd Grade [TextBox_72] : Limited Test 3/8 [TWNoteComboBox1] : Negative: 3 recalled words

## 2020-12-09 NOTE — REASON FOR VISIT
[Post Hospitalization] : a post hospitalization visit [Family Member] : family member [FreeTextEntry1] : follow up visit after recent hospital admission

## 2020-12-09 NOTE — ASSESSMENT
[Social Work Referral] : Social Work referral [Regular activities] : regular physical, social and mental activities [Daily physical exercise as tolerated] : Daily physical exercise as tolerated [FreeTextEntry1] : - f/u in 3-4 months or earlier if needed

## 2020-12-09 NOTE — HISTORY OF PRESENT ILLNESS
[Moderate] : Stage: Moderate [Worse] : Status: Worse [Memory Lapses Or Loss] : worsened memory impairment [Patient Observed To Be Agitated] : worsened agitation [Hostility Toward Caregivers] : stable aggression [Sleep Disturbances] : stable sleep disturbances [Fixed Beliefs Contradicted By Reality (Delusions)] : worsened delusions [Difficulty Finding Desired Words] : stable difficulty finding desired words [0] : 2) Feeling down, depressed, or hopeless: Not at all [FreeTextEntry1] : Mrs. Enriquez is a 79 y/o F with Hx of Depression, Gait instability, Memory loss, who is seen for follow up visit after recent hospitalization in [facility] due to fall complicated by right wrist fracture from 11/22-11/26.  Pt is accompanied by dtr Analia who assist with visit and history. \par \par Since being at home pt also had a second fall, daughter reported pts legs gave out due to weakness but was able to be catch by her HHA Samreen to prevent her from hitting the ground. Since then she has been complaining of pain in her lower back, specially when she stands up. She is planning to see spine specialist for further recommendations due to extensive back problems. \par \par # Aspiration\par - Since last visit pts aspiration / cough / wheezing has improved \par - Was seen by S&S who recommended to start soft diet with nectar thick liquids with improvement of symptoms\par - Pt was seen by cardiology with Echocardiogram WNL \par - She also had PFT's done with pulmonologist, results normal with mildly reduced DLCO\par \par # Cognitive impairment: \par - On Donepezil 5mg at HS with associated vivid dreams but currently improved\par - Lives with daughter Analia and has HHA Samreen 5 days for 4 hrs\par - Family is in the process of getting extra support at home, as Samreen may leave to [place] for approx 3 months \par \par # Depression:\par - Improved since Lexapro dose was increased \par - Currently taking Duloxetine 30mg and Lexapro 10mg daily \par \par # Chronic constipation. \par - Pt takes daily Milk of magnesium\par - Last BM 2 today \par \par # Charcot-Kassidy-Tooth disease\par - no falls reported since last visit\par - c/w fall precautions \par - PT / OT resumed after recent hospital admission  \par \par # Hearing loss:\par - Seen by ENT pt has asymmetrical hearing loss\par - Possible MRI to rule out an acoustic neuroma, MRI in 2015 during admission WNL\par

## 2020-12-09 NOTE — REVIEW OF SYSTEMS
[Loss Of Hearing] : hearing loss [Lower Ext Edema] : lower extremity edema [Cough] : cough [Constipation] : constipation [Joint Pain] : joint pain [Confused] : confusion [Recent Weight Gain (___ Lbs)] : recent [unfilled] ~Ulb weight gain [Chills] : no chills [Feeling Poorly] : not feeling poorly [Feeling Tired] : not feeling tired [Eye Pain] : no eye pain [Red Eyes] : eyes not red [Eyesight Problems] : no eyesight problems [Discharge From Eyes] : no purulent discharge from the eyes [Earache] : no earache [Nosebleeds] : no nosebleeds [Nasal Discharge] : no nasal discharge [Heart Rate Is Slow] : the heart rate was not slow [Heart Rate Is Fast] : the heart rate was not fast [Chest Pain] : no chest pain [Palpitations] : no palpitations [Shortness Of Breath] : no shortness of breath [Wheezing] : no wheezing [SOB on Exertion] : no shortness of breath during exertion [Orthopnea] : no orthopnea [Abdominal Pain] : no abdominal pain [Vomiting] : no vomiting [Diarrhea] : no diarrhea [Dysuria] : no dysuria [Incontinence] : no incontinence [Pelvic Pain] : no pelvic pain [Dysmenorrhea] : no dysmenorrhea [Arthralgias] : no arthralgias [Joint Swelling] : no joint swelling [Joint Stiffness] : no joint stiffness [Skin Lesions] : no skin lesions [Skin Wound] : no skin wound [Itching] : no itching [Convulsions] : no convulsions [Dizziness] : no dizziness [Fainting] : no fainting

## 2020-12-10 PROBLEM — M79.18 MYOFASCIAL PAIN: Status: ACTIVE | Noted: 2020-12-10

## 2020-12-10 NOTE — DATA REVIEWED
[MRI] : MRI [FreeTextEntry1] : Lumbar MRI\par Moderate multilevel spondylosis in the setting of moderate dextrocurvature \par of the lumbar spine with chronic compression deformity of the L2 vertebral \par body

## 2020-12-10 NOTE — HISTORY OF PRESENT ILLNESS
[FreeTextEntry1] : Ms. ASHLEY VIRK is a 80 year old female with \par multiple falls - initial fall around thanksgiving - fx right wrist s/p ORIF?\par \par Location: low back\par Inciting Event: fall 1 week ago\par Onset: acute on chronic\par Frequency: fairly constant\par Quality: stiffness, sharp\par Severity: severe with movement\par Aggravating Factor: prolong sitting (few min), AM stiffness, standing straight, transitional movement\par Relieving factor: rest\par Radiation: none to lower extremities right now - had in the past\par Numbness/Tingling: resolved\par Bowel/Bladder incontinence: denies\par Extremity weakness: leg weakness\par Conservative Tx: had NATHANIEL in the past - with benefits for radicular feature, trigger point injections - temporary relief, tried chiro

## 2020-12-10 NOTE — PHYSICAL EXAM
[FreeTextEntry1] : General: NAD, alert and oriented x 3\par Psych: normal affect, intact judgment and insight\par HEENT: NC/AT, normal visual tracking\par Pulmonary: normal respiratory effort, chest expansion appears symmetrical\par CV: extremities appear pink and well perfused\par Abd: non-distended\par Ext: no c/c/e - has bilateral AFO\par skin: normal color, appearance, and temperature\par \par Lumbar/Hip Spine:\par Gait - non-antalgic, able to heel and toe walk\par Inspection: normal muscle bulk without asymmetry\par Palpation: TTP over lumbar paraspinal > right SIJ\par ROM lumbar: within functional limits, pain with extension\par MMT: 5/5 bilateral lower extremities (HF, KE, KF, DF, PF, EHL)\par Reflexes: symmetric bilateral patella and ankle jerk\par Sensory: intact to light touch in all dermatomes of the bilateral lower extremities\par Provocative testing:\par Facet loading - negative\par SLR - negative\par SHARRON/FADIR - negative

## 2020-12-10 NOTE — PROCEDURE
[de-identified] : Patient has demonstrated limited relief from NSAIDS and rest. After discussion of the risks and benefits, the patient elected to proceed with a trigger point injection into the \par \par Lumbar paraspinal\par \par I confirmed no prior adverse reactions, no active infections, and no relevant allergies. The skin was prepped in the usual sterile manner. \par The sites were injected with local anesthetic followed by local needling. The injection was completed without complication and a bandage was applied. The patient tolerated the procedure well and was given post-injection instructions. \par \par Recommend Cold Treatment x 48 hours, analgesics prn. \par \par Medications used: 4 cc of 1% lidocaine and 20mg  Kenalog\par

## 2020-12-10 NOTE — ASSESSMENT
[FreeTextEntry1] : ASHLEY VIRK,  a 80 year-old female here with lumbar spondylosis, lumbar radiculopathy\par Discussed all potential treatment options including PT, oral medications, and interventional procedures\par - trigger point injection to lumbar paraspinal performed today - with some benefit, but still with significant patient with transitional movement\par - trial tizanidine HS PRN\par - inquired about lidocaine topical - which for superficial pain, but may try/continue if beneficial\par - she has had NATHANIEL in the past which help with radicular symptoms\par - order lumbar MRI - r/o stenosis and evaluate facet arthrosis\par - depending on MRI finding will discuss interventional procedure - NATHANIEL vs MBB/RFA\par \par

## 2020-12-15 ENCOUNTER — APPOINTMENT (OUTPATIENT)
Dept: MRI IMAGING | Facility: CLINIC | Age: 80
End: 2020-12-15
Payer: MEDICARE

## 2020-12-15 ENCOUNTER — OUTPATIENT (OUTPATIENT)
Dept: OUTPATIENT SERVICES | Facility: HOSPITAL | Age: 80
LOS: 1 days | End: 2020-12-15
Payer: MEDICARE

## 2020-12-15 DIAGNOSIS — M67.90 UNSPECIFIED DISORDER OF SYNOVIUM AND TENDON, UNSPECIFIED SITE: Chronic | ICD-10-CM

## 2020-12-15 DIAGNOSIS — M47.816 SPONDYLOSIS WITHOUT MYELOPATHY OR RADICULOPATHY, LUMBAR REGION: ICD-10-CM

## 2020-12-15 PROCEDURE — 72148 MRI LUMBAR SPINE W/O DYE: CPT | Mod: 26

## 2020-12-15 PROCEDURE — 72148 MRI LUMBAR SPINE W/O DYE: CPT

## 2020-12-16 ENCOUNTER — APPOINTMENT (OUTPATIENT)
Dept: MRI IMAGING | Facility: HOSPITAL | Age: 80
End: 2020-12-16

## 2020-12-21 ENCOUNTER — TRANSCRIPTION ENCOUNTER (OUTPATIENT)
Age: 80
End: 2020-12-21

## 2020-12-23 ENCOUNTER — NON-APPOINTMENT (OUTPATIENT)
Age: 80
End: 2020-12-23

## 2020-12-23 DIAGNOSIS — M54.16 RADICULOPATHY, LUMBAR REGION: ICD-10-CM

## 2020-12-28 ENCOUNTER — APPOINTMENT (OUTPATIENT)
Dept: SPINE | Facility: CLINIC | Age: 80
End: 2020-12-28
Payer: MEDICARE

## 2020-12-28 VITALS
BODY MASS INDEX: 26.43 KG/M2 | HEIGHT: 61 IN | OXYGEN SATURATION: 93 % | SYSTOLIC BLOOD PRESSURE: 133 MMHG | HEART RATE: 66 BPM | WEIGHT: 140 LBS | TEMPERATURE: 98.1 F | DIASTOLIC BLOOD PRESSURE: 80 MMHG

## 2020-12-28 PROCEDURE — 99204 OFFICE O/P NEW MOD 45 MIN: CPT

## 2020-12-29 NOTE — REVIEW OF SYSTEMS
[Loss Of Hearing] : hearing loss [As Noted in HPI] : as noted in HPI [Arthralgias] : arthralgias [Joint Pain] : joint pain [Joint Swelling] : joint swelling [Joint Stiffness] : joint stiffness [Limb Pain] : limb pain [Negative] : Heme/Lymph [FreeTextEntry2] : weakness [FreeTextEntry4] : difficulty swallowing, cough [FreeTextEntry9] : fractured right wrist.

## 2020-12-29 NOTE — HISTORY OF PRESENT ILLNESS
[FreeTextEntry1] : Low back pain [de-identified] : ASHLEY VIRK is a 80 year old lady who has a history of Charcot Kassidy Tooth disease and has difficulty ambulating and uses a walker. She has a history of left foot drop for 2 years and decreased mobility of her ankle on the right side and wears bilateral AFO braces. She fell in front of her house on the pavement onto her buttocks.  The patient has had significant pain since.  She has been found to have chronic L2 and L4 compression fractures and an acute L5 oblique transverse fracture.  She also has evidence of a coccyx fracture.  The patient tried to wear a brace, however it was uncomfortable but did decrease her pain.  She is taking Oxycodone and tizanidine.  She has tried epidural trigger point injections and acupuncture in the past prior to the fall for chronic low back pain with some relief.\par

## 2020-12-29 NOTE — PHYSICAL EXAM
[Person] : oriented to person [Place] : oriented to place [Time] : oriented to time [Short Term Intact] : short term memory intact [Remote Intact] : remote memory intact [Span Intact] : the attention span was normal [Concentration Intact] : normal concentrating ability [Fluency] : fluency intact [Comprehension] : comprehension intact [Current Events] : adequate knowledge of current events [Past History] : adequate knowledge of personal past history [Vocabulary] : adequate range of vocabulary [Cranial Nerves Optic (II)] : visual acuity intact bilaterally,  pupils equal round and reactive to light [Cranial Nerves Oculomotor (III)] : extraocular motion intact [Cranial Nerves Trigeminal (V)] : facial sensation intact symmetrically [Cranial Nerves Facial (VII)] : face symmetrical [Cranial Nerves Vestibulocochlear (VIII)] : hearing was intact bilaterally [Cranial Nerves Glossopharyngeal (IX)] : tongue and palate midline [Cranial Nerves Accessory (XI - Cranial And Spinal)] : head turning and shoulder shrug symmetric [Cranial Nerves Hypoglossal (XII)] : there was no tongue deviation with protrusion [Motor Tone] : muscle tone was normal in all four extremities [Motor Strength] : muscle strength was normal in all four extremities [No Muscle Atrophy] : normal bulk in all four extremities [4] : T1 abductor digiti minimi 4/5 [5] : L3 quadriceps 5/5 [Sensation Tactile Decrease] : light touch was intact [3+] : Patella left 3+ [Past-pointing] : there was no past-pointing [Tremor] : no tremor present [FreeTextEntry1] : Wears AFO braces bilaterally.  Left foot drop.  Some strength on right but decreased ankle mobility. [FreeTextEntry8] : Slow stiff, wide gait using a walker for stability.  Wears bilateral AFO braces.

## 2020-12-29 NOTE — ASSESSMENT
[FreeTextEntry1] : The images and findings were reviewed and discussed with the patient.  The patient was provided with a prescription for physical therapy, which does give her some relief.  She was also referred to interventional radiologist, Dr. Alexis Contreras for consideration of a kyphoplasty.  The patient will return as needed after the kyphoplasty.

## 2020-12-29 NOTE — REASON FOR VISIT
[New Patient Visit] : a new patient visit [Referred By: _________] : Patient was referred by FARSHAD [Other: _____] : [unfilled] [FreeTextEntry1] : The patient c/o low back and right leg pain since falling on 11/22/2020.

## 2020-12-29 NOTE — RESULTS/DATA
[FreeTextEntry1] : There is an L2 right superior end plate fracture, a chronic L4 superior endplate compression fracture, and an acute, subacute oblique transverse fracture.

## 2021-01-06 ENCOUNTER — APPOINTMENT (OUTPATIENT)
Dept: PAIN MANAGEMENT | Facility: CLINIC | Age: 81
End: 2021-01-06

## 2021-01-08 ENCOUNTER — APPOINTMENT (OUTPATIENT)
Dept: PAIN MANAGEMENT | Facility: CLINIC | Age: 81
End: 2021-01-08

## 2021-01-11 ENCOUNTER — APPOINTMENT (OUTPATIENT)
Dept: INTERVENTIONAL RADIOLOGY/VASCULAR | Facility: CLINIC | Age: 81
End: 2021-01-11
Payer: MEDICARE

## 2021-01-11 ENCOUNTER — APPOINTMENT (OUTPATIENT)
Dept: PAIN MANAGEMENT | Facility: CLINIC | Age: 81
End: 2021-01-11
Payer: MEDICARE

## 2021-01-11 VITALS
HEIGHT: 61 IN | SYSTOLIC BLOOD PRESSURE: 145 MMHG | BODY MASS INDEX: 28.13 KG/M2 | OXYGEN SATURATION: 98 % | DIASTOLIC BLOOD PRESSURE: 81 MMHG | WEIGHT: 149 LBS | RESPIRATION RATE: 16 BRPM | HEART RATE: 66 BPM

## 2021-01-11 PROCEDURE — 99205 OFFICE O/P NEW HI 60 MIN: CPT | Mod: 95

## 2021-01-11 PROCEDURE — 99204 OFFICE O/P NEW MOD 45 MIN: CPT

## 2021-01-11 NOTE — PHYSICAL EXAM
[General Appearance - Alert] : alert [General Appearance - In No Acute Distress] : in no acute distress [Affect] : the affect was normal [Person] : oriented to person [Place] : oriented to place [Time] : oriented to time [Cranial Nerves Oculomotor (III)] : extraocular motion intact

## 2021-01-12 ENCOUNTER — APPOINTMENT (OUTPATIENT)
Dept: ORTHOPEDIC SURGERY | Facility: CLINIC | Age: 81
End: 2021-01-12
Payer: MEDICARE

## 2021-01-12 DIAGNOSIS — S52.502E: ICD-10-CM

## 2021-01-12 DIAGNOSIS — S52.602E: ICD-10-CM

## 2021-01-12 PROCEDURE — 99024 POSTOP FOLLOW-UP VISIT: CPT

## 2021-01-12 PROCEDURE — 73110 X-RAY EXAM OF WRIST: CPT | Mod: RT

## 2021-01-14 DIAGNOSIS — R13.10 DYSPHAGIA, UNSPECIFIED: ICD-10-CM

## 2021-01-15 PROBLEM — S52.502E: Status: ACTIVE | Noted: 2020-12-09

## 2021-01-18 NOTE — HISTORY OF PRESENT ILLNESS
[Home] : at home, [unfilled] , at the time of the visit. [Medical Office: ([facility])___] : at the medical office located in  [Family Member] : family member [FreeTextEntry1] : 80 female scheduled for kyphoplasty on Jan 27, 2020. \par \par Patient has a ho chronic back pain responsive to NATHANIEL. Nov 22, 2020 fell and fracture her right wrist. After leaving the hospital, she noted difficulty walking and fell on her back "legs gave out" one week later on December 3 rd. Since that time, the pain progressively worsened. MRI LS spine L5 fracture. The pain is like electricity in her lower back radiating posteriorly down the leg extending to bottom of foot.\par No focal weakness, bladder or bowel dysfunction. No sensory level.\par She recently has been dx with osteopenia.  \par \par \par \par

## 2021-01-18 NOTE — ASSESSMENT
[FreeTextEntry1] : Chronic Lumbosacral radiculopathy\par Chronic pain syndrome\par New L5 fracture pending augmentation January 28 2021\par In meantime, would not recommend an NATHANIEL until after procedure only if pain persists\par Will initiate Utracet 1/2 to 1 bid to tid. Advised of AEs.Patient to stop OTC tylenol\par \par Of note, since this was a TEB evaluation, I could not perform a detailed neurological examination in order to provide clearance. \par \par Spent 40 min

## 2021-01-20 ENCOUNTER — OUTPATIENT (OUTPATIENT)
Dept: OUTPATIENT SERVICES | Facility: HOSPITAL | Age: 81
LOS: 1 days | End: 2021-01-20

## 2021-01-20 ENCOUNTER — APPOINTMENT (OUTPATIENT)
Dept: SPEECH THERAPY | Facility: CLINIC | Age: 81
End: 2021-01-20

## 2021-01-20 VITALS
WEIGHT: 144.4 LBS | RESPIRATION RATE: 14 BRPM | SYSTOLIC BLOOD PRESSURE: 130 MMHG | DIASTOLIC BLOOD PRESSURE: 80 MMHG | OXYGEN SATURATION: 98 % | HEART RATE: 77 BPM | TEMPERATURE: 98 F

## 2021-01-20 DIAGNOSIS — S32.050A WEDGE COMPRESSION FRACTURE OF FIFTH LUMBAR VERTEBRA, INITIAL ENCOUNTER FOR CLOSED FRACTURE: ICD-10-CM

## 2021-01-20 DIAGNOSIS — S42.301A UNSPECIFIED FRACTURE OF SHAFT OF HUMERUS, RIGHT ARM, INITIAL ENCOUNTER FOR CLOSED FRACTURE: Chronic | ICD-10-CM

## 2021-01-20 DIAGNOSIS — M67.90 UNSPECIFIED DISORDER OF SYNOVIUM AND TENDON, UNSPECIFIED SITE: Chronic | ICD-10-CM

## 2021-01-20 LAB
ANION GAP SERPL CALC-SCNC: 13 MMOL/L — SIGNIFICANT CHANGE UP (ref 7–14)
BUN SERPL-MCNC: 12 MG/DL — SIGNIFICANT CHANGE UP (ref 7–23)
CALCIUM SERPL-MCNC: 9.4 MG/DL — SIGNIFICANT CHANGE UP (ref 8.4–10.5)
CHLORIDE SERPL-SCNC: 104 MMOL/L — SIGNIFICANT CHANGE UP (ref 98–107)
CO2 SERPL-SCNC: 24 MMOL/L — SIGNIFICANT CHANGE UP (ref 22–31)
CREAT SERPL-MCNC: 0.63 MG/DL — SIGNIFICANT CHANGE UP (ref 0.5–1.3)
GLUCOSE SERPL-MCNC: 100 MG/DL — HIGH (ref 70–99)
HCT VFR BLD CALC: 42.8 % — SIGNIFICANT CHANGE UP (ref 34.5–45)
HGB BLD-MCNC: 13.5 G/DL — SIGNIFICANT CHANGE UP (ref 11.5–15.5)
MCHC RBC-ENTMCNC: 29.9 PG — SIGNIFICANT CHANGE UP (ref 27–34)
MCHC RBC-ENTMCNC: 31.5 GM/DL — LOW (ref 32–36)
MCV RBC AUTO: 94.9 FL — SIGNIFICANT CHANGE UP (ref 80–100)
NRBC # BLD: 0 /100 WBCS — SIGNIFICANT CHANGE UP
NRBC # FLD: 0 K/UL — SIGNIFICANT CHANGE UP
PLATELET # BLD AUTO: 214 K/UL — SIGNIFICANT CHANGE UP (ref 150–400)
POTASSIUM SERPL-MCNC: 3.9 MMOL/L — SIGNIFICANT CHANGE UP (ref 3.5–5.3)
POTASSIUM SERPL-SCNC: 3.9 MMOL/L — SIGNIFICANT CHANGE UP (ref 3.5–5.3)
RBC # BLD: 4.51 M/UL — SIGNIFICANT CHANGE UP (ref 3.8–5.2)
RBC # FLD: 13.2 % — SIGNIFICANT CHANGE UP (ref 10.3–14.5)
SODIUM SERPL-SCNC: 141 MMOL/L — SIGNIFICANT CHANGE UP (ref 135–145)
WBC # BLD: 6.72 K/UL — SIGNIFICANT CHANGE UP (ref 3.8–10.5)
WBC # FLD AUTO: 6.72 K/UL — SIGNIFICANT CHANGE UP (ref 3.8–10.5)

## 2021-01-20 RX ORDER — DONEPEZIL HYDROCHLORIDE 10 MG/1
1 TABLET, FILM COATED ORAL
Qty: 0 | Refills: 0 | DISCHARGE

## 2021-01-20 RX ORDER — ESCITALOPRAM OXALATE 10 MG/1
1 TABLET, FILM COATED ORAL
Qty: 0 | Refills: 0 | DISCHARGE

## 2021-01-20 RX ORDER — FLUTICASONE PROPIONATE 50 MCG
1 SPRAY, SUSPENSION NASAL
Qty: 0 | Refills: 0 | DISCHARGE

## 2021-01-20 RX ORDER — DULOXETINE HYDROCHLORIDE 30 MG/1
1 CAPSULE, DELAYED RELEASE ORAL
Qty: 0 | Refills: 0 | DISCHARGE

## 2021-01-20 RX ORDER — OMEPRAZOLE 10 MG/1
1 CAPSULE, DELAYED RELEASE ORAL
Qty: 0 | Refills: 0 | DISCHARGE

## 2021-01-20 RX ORDER — CETIRIZINE HYDROCHLORIDE 10 MG/1
1 TABLET ORAL
Qty: 0 | Refills: 0 | DISCHARGE

## 2021-01-20 NOTE — H&P PST ADULT - NSICDXPASTMEDICALHX_GEN_ALL_CORE_FT
PAST MEDICAL HISTORY:  Contracture of hand joint, right     Dementia     Dementia, senile with depression     Dysphagia Chronic cough, on thickened liquids    GERD (gastroesophageal reflux disease)     Glaucoma     Hearing loss     Left foot drop     Muscular dystrophy     Osteoporosis     Pulmonary embolism s/p short-term anticoagulation 2013     PAST MEDICAL HISTORY:  Contracture of hand joint, right     Dementia     Dementia, senile with depression     Dysphagia Chronic cough, on thickened liquids    GERD (gastroesophageal reflux disease)     Glaucoma     Hearing loss     History of perforated ear drum right    Left foot drop     Muscular dystrophy     Osteoporosis     Pulmonary embolism s/p short-term anticoagulation 2013

## 2021-01-20 NOTE — H&P PST ADULT - ENMT COMMENTS
dysphagia (new 2020)  due to muscular dystrophy , uses thick with liquids dysphagia (new since 2020)  due to muscular dystrophy , uses thickener with liquids

## 2021-01-20 NOTE — DISCUSSION/SUMMARY
[FreeTextEntry1] : In summary, Ms. Enriquez is a pleasant 79 yo with past medical history of memory loss, depression, Charcot-pratik tooth disease, provoked PE in 2014 s/p 6 months of Eliquis, GERD presents [facility]. \par Today she is accompanied by her 2 daughters- Fátima and Analia. \par she reports complaints of intermittent wheezing and BL LE edema. \par Recommend - \par 1) Edema - Echocardiogram to evaluate wall motion and valvular function \par 2) Blood pressure - Was slightly elevated ~ 's \par     May be secondary to Mucinex - D, advised to take Mucinex and D component can elevate her BP . \par   Also advised to monitor BP logs \par 3) Routine blood work including cbc, cmp, tsh, a1c and lipid panel. \par \par Follow up in 6 weeks

## 2021-01-20 NOTE — H&P PST ADULT - NEGATIVE NEUROLOGICAL SYMPTOMS
no generalized seizures/no focal seizures/no syncope/no tremors/no loss of consciousness/no confusion

## 2021-01-20 NOTE — H&P PST ADULT - MUSCULOSKELETAL COMMENTS
slow and rigid movement of all extremities and changing positions right hand contracture PMH muscular dystrophy right hand contracture PMH muscular dystrophy, L5 oblique fracture

## 2021-01-20 NOTE — ADDENDUM
[FreeTextEntry1] : 1/20/21: patient was recently seen and had no overt evidence of acute heart failure, unstable angina or arrhythmias. TTE was done that revealed preserved LV function with no significant valvular pathology. ECG with no acute ischemic changes. Patient is at acceptable risk for the spine surgery

## 2021-01-20 NOTE — REASON FOR VISIT
[FreeTextEntry1] : Ms. Enriquez is a pleasant 79 yo presents to [facility]. She presents in accompanied by her 2 daughters- Fátima and Analia. \par She denies any significant family medial history but admits to personal history of memory loss, depression, Charcot-pratik tooth disease, provoked PE in 2014 s/p 6 months of Eliquis, GERD. According to her daughters they have noticed wheezes that occur with or without exertion, occ mucinex has helped  but not lately. Denies any CP, SOB, dizziness, LH, near syncope or syncope. \par No prior cardiac work up. \par \par She was evaluated by ENT and pulmonary and told WNL. Addl, her BP are slightly elevated liekly due to Mucinex -D .

## 2021-01-20 NOTE — H&P PST ADULT - MS GEN HX ROS MEA POS PC
arthralgia/myalgia/muscle weakness/stiffness/back pain arthralgia/joint pain/myalgia/muscle weakness/stiffness/back pain

## 2021-01-20 NOTE — H&P PST ADULT - ASSESSMENT
Problem: L5 compression fracture  Assessment and Plan: Patient scheduled for surgery on 1/27/2021  Patient provided with verbal and written presurgical instructions; verbalized understanding  with teach back.    Patient provided with famotidine for GI prophylaxis; verbalized understanding.    Patient provided with Chlorhexidine wash, verbal and written instructions reviewed. Patient demonstrated understanding with teach back.   Patient confirmed COVID appointment     OR booking notified of implants     EKG, Echo in chart     Cardiac evaluation requested by PST for low METs, muscular dystrophy, patient verbalized understanding, will obtain  Neuro evaluation in chart  Case discussed with Dr. Jackson    Problem: Dementia  Assessment and Plan: Patient instructed to take Donepezil as prescribed     Problem: Depression   Plan: Instructed to take duloxetine and escitalopram as usual, verbalized understanding

## 2021-01-20 NOTE — H&P PST ADULT - HISTORY OF PRESENT ILLNESS
80 year old female with PMH Dementia, depression, glaucoma, muscular dystrophy presents with history of a fall in November 2020 fracturing right arm s/p surgery, while patient was standing outside using walker patient fell landing on her buttocks, xrays revealed an acute L5 oblique transverse fracture, reports lower back radiating to bilateral hips right worse than left. Patient is reported to still be able to walk using walker, PMH of left foot drop 2/2 muscular dystrophy and uses bilateral AFO braces. Scheduled for L5 vertebral body augmentation

## 2021-01-24 ENCOUNTER — APPOINTMENT (OUTPATIENT)
Dept: DISASTER EMERGENCY | Facility: CLINIC | Age: 81
End: 2021-01-24

## 2021-01-26 PROBLEM — F03.90 UNSPECIFIED DEMENTIA WITHOUT BEHAVIORAL DISTURBANCE: Chronic | Status: ACTIVE | Noted: 2021-01-20

## 2021-01-26 PROBLEM — Z86.69 PERSONAL HISTORY OF OTHER DISEASES OF THE NERVOUS SYSTEM AND SENSE ORGANS: Chronic | Status: ACTIVE | Noted: 2021-01-20

## 2021-01-26 PROBLEM — M21.372 FOOT DROP, LEFT FOOT: Chronic | Status: ACTIVE | Noted: 2021-01-20

## 2021-01-26 PROBLEM — H40.9 UNSPECIFIED GLAUCOMA: Chronic | Status: ACTIVE | Noted: 2021-01-20

## 2021-01-26 PROBLEM — I26.99 OTHER PULMONARY EMBOLISM WITHOUT ACUTE COR PULMONALE: Chronic | Status: ACTIVE | Noted: 2020-11-22

## 2021-01-26 PROBLEM — M24.541 CONTRACTURE, RIGHT HAND: Chronic | Status: ACTIVE | Noted: 2021-01-20

## 2021-01-26 PROBLEM — H91.90 UNSPECIFIED HEARING LOSS, UNSPECIFIED EAR: Chronic | Status: ACTIVE | Noted: 2021-01-20

## 2021-01-27 ENCOUNTER — OUTPATIENT (OUTPATIENT)
Dept: OUTPATIENT SERVICES | Facility: HOSPITAL | Age: 81
LOS: 1 days | End: 2021-01-27
Payer: MEDICARE

## 2021-01-27 ENCOUNTER — RESULT REVIEW (OUTPATIENT)
Age: 81
End: 2021-01-27

## 2021-01-27 DIAGNOSIS — M67.90 UNSPECIFIED DISORDER OF SYNOVIUM AND TENDON, UNSPECIFIED SITE: Chronic | ICD-10-CM

## 2021-01-27 DIAGNOSIS — S42.301A UNSPECIFIED FRACTURE OF SHAFT OF HUMERUS, RIGHT ARM, INITIAL ENCOUNTER FOR CLOSED FRACTURE: Chronic | ICD-10-CM

## 2021-01-27 DIAGNOSIS — S32.050A WEDGE COMPRESSION FRACTURE OF FIFTH LUMBAR VERTEBRA, INITIAL ENCOUNTER FOR CLOSED FRACTURE: ICD-10-CM

## 2021-01-27 PROCEDURE — 88311 DECALCIFY TISSUE: CPT | Mod: 26

## 2021-01-27 PROCEDURE — 88305 TISSUE EXAM BY PATHOLOGIST: CPT | Mod: 26

## 2021-01-27 PROCEDURE — 22514 PERQ VERTEBRAL AUGMENTATION: CPT

## 2021-01-27 PROCEDURE — 88342 IMHCHEM/IMCYTCHM 1ST ANTB: CPT | Mod: 26

## 2021-02-02 ENCOUNTER — APPOINTMENT (OUTPATIENT)
Dept: PAIN MANAGEMENT | Facility: CLINIC | Age: 81
End: 2021-02-02

## 2021-02-03 ENCOUNTER — APPOINTMENT (OUTPATIENT)
Dept: SPEECH THERAPY | Facility: CLINIC | Age: 81
End: 2021-02-03

## 2021-02-03 ENCOUNTER — OUTPATIENT (OUTPATIENT)
Dept: OUTPATIENT SERVICES | Facility: HOSPITAL | Age: 81
LOS: 1 days | Discharge: HOME | End: 2021-02-03

## 2021-02-03 DIAGNOSIS — M54.5 LOW BACK PAIN: ICD-10-CM

## 2021-02-03 DIAGNOSIS — M80.88XA OTHER OSTEOPOROSIS WITH CURRENT PATHOLOGICAL FRACTURE, VERTEBRA(E), INITIAL ENCOUNTER FOR FRACTURE: ICD-10-CM

## 2021-02-03 DIAGNOSIS — M67.90 UNSPECIFIED DISORDER OF SYNOVIUM AND TENDON, UNSPECIFIED SITE: Chronic | ICD-10-CM

## 2021-02-03 DIAGNOSIS — S42.301A UNSPECIFIED FRACTURE OF SHAFT OF HUMERUS, RIGHT ARM, INITIAL ENCOUNTER FOR CLOSED FRACTURE: Chronic | ICD-10-CM

## 2021-02-12 LAB — SURGICAL PATHOLOGY STUDY: SIGNIFICANT CHANGE UP

## 2021-02-17 ENCOUNTER — APPOINTMENT (OUTPATIENT)
Dept: SPEECH THERAPY | Facility: CLINIC | Age: 81
End: 2021-02-17

## 2021-02-22 ENCOUNTER — APPOINTMENT (OUTPATIENT)
Dept: INTERVENTIONAL RADIOLOGY/VASCULAR | Facility: CLINIC | Age: 81
End: 2021-02-22
Payer: MEDICARE

## 2021-02-22 ENCOUNTER — APPOINTMENT (OUTPATIENT)
Dept: RADIOLOGY | Facility: HOSPITAL | Age: 81
End: 2021-02-22

## 2021-02-22 ENCOUNTER — OUTPATIENT (OUTPATIENT)
Dept: OUTPATIENT SERVICES | Facility: HOSPITAL | Age: 81
LOS: 1 days | End: 2021-02-22
Payer: MEDICARE

## 2021-02-22 VITALS
DIASTOLIC BLOOD PRESSURE: 71 MMHG | OXYGEN SATURATION: 98 % | HEIGHT: 61 IN | SYSTOLIC BLOOD PRESSURE: 123 MMHG | HEART RATE: 76 BPM | WEIGHT: 149 LBS | BODY MASS INDEX: 28.13 KG/M2 | RESPIRATION RATE: 17 BRPM

## 2021-02-22 DIAGNOSIS — M54.15 RADICULOPATHY, THORACOLUMBAR REGION: ICD-10-CM

## 2021-02-22 DIAGNOSIS — M67.90 UNSPECIFIED DISORDER OF SYNOVIUM AND TENDON, UNSPECIFIED SITE: Chronic | ICD-10-CM

## 2021-02-22 DIAGNOSIS — M54.5 LOW BACK PAIN: ICD-10-CM

## 2021-02-22 DIAGNOSIS — S32.050A WEDGE COMPRESSION FRACTURE OF FIFTH LUMBAR VERTEBRA, INITIAL ENCOUNTER FOR CLOSED FRACTURE: ICD-10-CM

## 2021-02-22 DIAGNOSIS — S42.301A UNSPECIFIED FRACTURE OF SHAFT OF HUMERUS, RIGHT ARM, INITIAL ENCOUNTER FOR CLOSED FRACTURE: Chronic | ICD-10-CM

## 2021-02-22 PROCEDURE — 72100 X-RAY EXAM L-S SPINE 2/3 VWS: CPT | Mod: 26

## 2021-02-22 PROCEDURE — 99214 OFFICE O/P EST MOD 30 MIN: CPT

## 2021-02-24 ENCOUNTER — APPOINTMENT (OUTPATIENT)
Dept: SPEECH THERAPY | Facility: CLINIC | Age: 81
End: 2021-02-24

## 2021-02-26 ENCOUNTER — APPOINTMENT (OUTPATIENT)
Dept: PAIN MANAGEMENT | Facility: CLINIC | Age: 81
End: 2021-02-26

## 2021-03-01 DIAGNOSIS — R13.12 DYSPHAGIA, OROPHARYNGEAL PHASE: ICD-10-CM

## 2021-03-03 ENCOUNTER — APPOINTMENT (OUTPATIENT)
Dept: SPEECH THERAPY | Facility: CLINIC | Age: 81
End: 2021-03-03

## 2021-03-10 ENCOUNTER — APPOINTMENT (OUTPATIENT)
Dept: SPEECH THERAPY | Facility: CLINIC | Age: 81
End: 2021-03-10

## 2021-03-17 ENCOUNTER — APPOINTMENT (OUTPATIENT)
Dept: SPEECH THERAPY | Facility: CLINIC | Age: 81
End: 2021-03-17

## 2021-03-24 ENCOUNTER — APPOINTMENT (OUTPATIENT)
Dept: SPEECH THERAPY | Facility: CLINIC | Age: 81
End: 2021-03-24

## 2021-03-31 ENCOUNTER — APPOINTMENT (OUTPATIENT)
Dept: SPEECH THERAPY | Facility: CLINIC | Age: 81
End: 2021-03-31

## 2021-05-05 NOTE — ED PROVIDER NOTE - CONSTITUTIONAL NEGATIVE STATEMENT, MLM
1st attempt (encounter to be closed by writer):  Spoke with patient and/or Parent/Guardian to reschedule virtual medication management appointment with Dr Jenae Merida MD for 5/5/2021   Rescheduled to 5/27/2021 at 830 am     Reason:   Provider out    Last completed appointment with provider:   3/29/2021 no fever and no chills.

## 2021-05-25 ENCOUNTER — APPOINTMENT (OUTPATIENT)
Dept: MRI IMAGING | Facility: CLINIC | Age: 81
End: 2021-05-25
Payer: MEDICARE

## 2021-05-25 ENCOUNTER — OUTPATIENT (OUTPATIENT)
Dept: OUTPATIENT SERVICES | Facility: HOSPITAL | Age: 81
LOS: 1 days | End: 2021-05-25
Payer: MEDICARE

## 2021-05-25 DIAGNOSIS — S42.301A UNSPECIFIED FRACTURE OF SHAFT OF HUMERUS, RIGHT ARM, INITIAL ENCOUNTER FOR CLOSED FRACTURE: Chronic | ICD-10-CM

## 2021-05-25 DIAGNOSIS — M67.90 UNSPECIFIED DISORDER OF SYNOVIUM AND TENDON, UNSPECIFIED SITE: Chronic | ICD-10-CM

## 2021-05-25 DIAGNOSIS — Z00.8 ENCOUNTER FOR OTHER GENERAL EXAMINATION: ICD-10-CM

## 2021-05-25 PROCEDURE — 72195 MRI PELVIS W/O DYE: CPT | Mod: 26,MH

## 2021-05-25 PROCEDURE — 72195 MRI PELVIS W/O DYE: CPT

## 2021-06-02 ENCOUNTER — NON-APPOINTMENT (OUTPATIENT)
Age: 81
End: 2021-06-02

## 2021-07-02 ENCOUNTER — RESULT REVIEW (OUTPATIENT)
Age: 81
End: 2021-07-02

## 2021-07-27 NOTE — ED ADULT NURSE NOTE - PAIN RATING/NUMBER SCALE (0-10): REST
6
Alert and oriented to person, place, time/situation. normal mood and affect. no apparent risk to self or others.

## 2021-08-27 RX ORDER — FUROSEMIDE 20 MG/1
20 TABLET ORAL DAILY
Qty: 5 | Refills: 0 | Status: ACTIVE | COMMUNITY
Start: 2021-08-27 | End: 1900-01-01

## 2021-08-30 ENCOUNTER — APPOINTMENT (OUTPATIENT)
Dept: GERIATRICS | Facility: CLINIC | Age: 81
End: 2021-08-30
Payer: MEDICARE

## 2021-08-30 ENCOUNTER — LABORATORY RESULT (OUTPATIENT)
Age: 81
End: 2021-08-30

## 2021-08-30 VITALS
WEIGHT: 158.38 LBS | DIASTOLIC BLOOD PRESSURE: 62 MMHG | BODY MASS INDEX: 29.9 KG/M2 | HEART RATE: 88 BPM | TEMPERATURE: 97.7 F | SYSTOLIC BLOOD PRESSURE: 122 MMHG | RESPIRATION RATE: 16 BRPM | OXYGEN SATURATION: 95 % | HEIGHT: 61 IN

## 2021-08-30 DIAGNOSIS — R41.3 OTHER AMNESIA: ICD-10-CM

## 2021-08-30 DIAGNOSIS — R60.0 LOCALIZED EDEMA: ICD-10-CM

## 2021-08-30 DIAGNOSIS — R73.01 IMPAIRED FASTING GLUCOSE: ICD-10-CM

## 2021-08-30 DIAGNOSIS — K21.9 GASTRO-ESOPHAGEAL REFLUX DISEASE W/OUT ESOPHAGITIS: ICD-10-CM

## 2021-08-30 PROCEDURE — 99214 OFFICE O/P EST MOD 30 MIN: CPT

## 2021-08-30 RX ORDER — TRAMADOL HYDROCHLORIDE AND ACETAMINOPHEN 37.5; 325 MG/1; MG/1
37.5-325 TABLET, FILM COATED ORAL 3 TIMES DAILY
Qty: 60 | Refills: 0 | Status: DISCONTINUED | COMMUNITY
Start: 2021-01-11 | End: 2021-08-30

## 2021-08-30 RX ORDER — BENZONATATE 200 MG/1
200 CAPSULE ORAL
Qty: 20 | Refills: 0 | Status: ACTIVE | COMMUNITY
Start: 2021-05-17

## 2021-08-30 RX ORDER — METHYLPREDNISOLONE 4 MG/1
4 TABLET ORAL
Qty: 21 | Refills: 0 | Status: DISCONTINUED | COMMUNITY
Start: 2021-05-17

## 2021-08-30 NOTE — ASSESSMENT
[Social Work Referral] : Social Work referral [Regular activities] : regular physical, social and mental activities [Daily physical exercise as tolerated] : Daily physical exercise as tolerated [FreeTextEntry1] : - f/u in October\par - ordered repeat blood work \par - referral given for PT

## 2021-08-30 NOTE — REASON FOR VISIT
[Family Member] : family member [Follow-Up] : a follow-up visit [FreeTextEntry1] : follow up visit due to increase lower extremity edema

## 2021-08-30 NOTE — PHYSICAL EXAM
[General Appearance - Alert] : alert [General Appearance - In No Acute Distress] : in no acute distress [General Appearance - Well Nourished] : well nourished [General Appearance - Well Developed] : well developed [General Appearance - Well-Appearing] : healthy appearing [Sclera] : the sclera and conjunctiva were normal [PERRL With Normal Accommodation] : pupils were equal in size, round, and reactive to light [Extraocular Movements] : extraocular movements were intact [Strabismus] : no strabismus was seen [Normal Oral Mucosa] : normal oral mucosa [No Oral Pallor] : no oral pallor [Both Tympanic Membranes Were Examined] : both tympanic membranes were normal [Outer Ear] : the ears and nose were normal in appearance [Neck Appearance] : the appearance of the neck was normal [Neck Cervical Mass (___cm)] : no neck mass was observed [Jugular Venous Distention Increased] : there was no jugular-venous distention [Respiration, Rhythm And Depth] : normal respiratory rhythm and effort [Exaggerated Use Of Accessory Muscles For Inspiration] : no accessory muscle use [Auscultation Breath Sounds / Voice Sounds] : lungs were clear to auscultation bilaterally [Apical Impulse] : the apical impulse was normal [Heart Rate And Rhythm] : heart rate was normal and rhythm regular [Heart Sounds] : normal S1 and S2 [Heart Sounds Gallop] : no gallops [Arterial Pulses Carotid] : carotid pulses were normal with no bruits [Bowel Sounds] : normal bowel sounds [Abdomen Soft] : soft [Abdomen Tenderness] : non-tender [No CVA Tenderness] : no ~M costovertebral angle tenderness [Skin Color & Pigmentation] : normal skin color and pigmentation [] : no rash [No Focal Deficits] : no focal deficits [___/1] : [unfilled]/1   [___/5] : [unfilled]/5 [Dementia] : Dementia [Oriented To Time, Place, And Person] : oriented to person, place, and time [FreeTextEntry1] : LE edema 2+  [TextBox_2] : Yes [TextBox_4] : 3rd Grade [TextBox_72] : Limited Test 3/8 [TWNoteComboBox1] : Negative: 3 recalled words

## 2021-08-30 NOTE — REVIEW OF SYSTEMS
[Recent Weight Gain (___ Lbs)] : recent [unfilled] ~Ulb weight gain [Loss Of Hearing] : hearing loss [Lower Ext Edema] : lower extremity edema [Cough] : cough [Constipation] : constipation [Confused] : confusion [Chills] : no chills [Feeling Poorly] : not feeling poorly [Feeling Tired] : not feeling tired [Eye Pain] : no eye pain [Red Eyes] : eyes not red [Eyesight Problems] : no eyesight problems [Discharge From Eyes] : no purulent discharge from the eyes [Earache] : no earache [Nosebleeds] : no nosebleeds [Nasal Discharge] : no nasal discharge [Heart Rate Is Slow] : the heart rate was not slow [Heart Rate Is Fast] : the heart rate was not fast [Chest Pain] : no chest pain [Palpitations] : no palpitations [Shortness Of Breath] : no shortness of breath [Wheezing] : no wheezing [SOB on Exertion] : no shortness of breath during exertion [Orthopnea] : no orthopnea [Abdominal Pain] : no abdominal pain [Vomiting] : no vomiting [Diarrhea] : no diarrhea [Dysuria] : no dysuria [Incontinence] : no incontinence [Pelvic Pain] : no pelvic pain [Dysmenorrhea] : no dysmenorrhea [Arthralgias] : no arthralgias [Joint Swelling] : no joint swelling [Joint Stiffness] : no joint stiffness [Skin Lesions] : no skin lesions [Skin Wound] : no skin wound [Itching] : no itching [Convulsions] : no convulsions [Dizziness] : no dizziness [Fainting] : no fainting

## 2021-08-30 NOTE — HISTORY OF PRESENT ILLNESS
[Moderate] : Stage: Moderate [Worse] : Status: Worse [Memory Lapses Or Loss] : worsened memory impairment [Patient Observed To Be Agitated] : worsened agitation [Hostility Toward Caregivers] : stable aggression [Sleep Disturbances] : stable sleep disturbances [Fixed Beliefs Contradicted By Reality (Delusions)] : worsened delusions [Difficulty Finding Desired Words] : stable difficulty finding desired words [Any fall with injury in past year] : Patient reported fall with injury in the past year [0] : 2) Feeling down, depressed, or hopeless: Not at all (0) [FreeTextEntry1] : Mrs. Enriquez is a 82 y/o F with Hx of Depression, Gait instability, Memory loss, who is seen for follow up visit due to worsening LE edema for the past 2 weeks. She reported this has also worsened her back pain. Her daughter Fátima assists in story over the phone. \par \par She continues to wear her daily compression stockings and has LE edema above her knee. She has prior hx of LE edema with negative DVT's. \par \par # Aspiration\par - Since last visit pts aspiration / cough / wheezing has improved \par - Was seen by S&S who recommended to start soft diet with nectar thick liquids with improvement of symptoms\par - Pt was seen by cardiology with Echocardiogram WNL \par - She also had PFT's done with pulmonologist, results normal with mildly reduced DLCO\par \par # Cognitive impairment: \par - On Donepezil 5mg at HS with associated vivid dreams but currently improved\par - Lives with daughter Analia and has HHA Samreen 5 days for 4 hrs\par \par # Depression:\par - Improved since Lexapro dose was increased \par - Currently taking Duloxetine 30mg and Lexapro 10mg daily \par \par # Chronic constipation. \par - Pt takes daily Milk of magnesium\par - Last BM 2 today \par \par # Charcot-Kassidy-Tooth disease\par - no falls reported since last visit\par - c/w fall precautions \par - PT / OT resumed after recent hospital admission \par \par # Hearing loss:\par - Seen by ENT pt has asymmetrical hearing loss\par - Possible MRI to rule out an acoustic neuroma, MRI in 2015 during admission WNL

## 2021-08-31 LAB
25(OH)D3 SERPL-MCNC: 28.2 NG/ML
ANION GAP SERPL CALC-SCNC: 10 MMOL/L
BASOPHILS # BLD AUTO: 0.03 K/UL
BASOPHILS NFR BLD AUTO: 0.7 %
BUN SERPL-MCNC: 10 MG/DL
CALCIUM SERPL-MCNC: 9.5 MG/DL
CHLORIDE SERPL-SCNC: 104 MMOL/L
CO2 SERPL-SCNC: 30 MMOL/L
CREAT SERPL-MCNC: 0.66 MG/DL
EOSINOPHIL # BLD AUTO: 0.07 K/UL
EOSINOPHIL NFR BLD AUTO: 1.6 %
ESTIMATED AVERAGE GLUCOSE: 123 MG/DL
FOLATE SERPL-MCNC: >20 NG/ML
GLUCOSE SERPL-MCNC: 124 MG/DL
HBA1C MFR BLD HPLC: 5.9 %
HCT VFR BLD CALC: 43.1 %
HGB BLD-MCNC: 13.6 G/DL
IMM GRANULOCYTES NFR BLD AUTO: 0.2 %
LYMPHOCYTES # BLD AUTO: 1.59 K/UL
LYMPHOCYTES NFR BLD AUTO: 37 %
MAN DIFF?: NORMAL
MCHC RBC-ENTMCNC: 31.5 PG
MCHC RBC-ENTMCNC: 31.6 GM/DL
MCV RBC AUTO: 99.8 FL
MONOCYTES # BLD AUTO: 0.36 K/UL
MONOCYTES NFR BLD AUTO: 8.4 %
NEUTROPHILS # BLD AUTO: 2.24 K/UL
NEUTROPHILS NFR BLD AUTO: 52.1 %
PLATELET # BLD AUTO: 197 K/UL
POTASSIUM SERPL-SCNC: 4.7 MMOL/L
RBC # BLD: 4.32 M/UL
RBC # FLD: 12.3 %
SODIUM SERPL-SCNC: 143 MMOL/L
TSH SERPL-ACNC: 4.4 UIU/ML
VIT B12 SERPL-MCNC: 650 PG/ML
WBC # FLD AUTO: 4.3 K/UL

## 2021-11-10 ENCOUNTER — RX RENEWAL (OUTPATIENT)
Age: 81
End: 2021-11-10

## 2021-11-18 ENCOUNTER — APPOINTMENT (OUTPATIENT)
Dept: PULMONOLOGY | Facility: CLINIC | Age: 81
End: 2021-11-18
Payer: MEDICARE

## 2021-11-18 VITALS
DIASTOLIC BLOOD PRESSURE: 74 MMHG | TEMPERATURE: 98 F | HEART RATE: 77 BPM | OXYGEN SATURATION: 96 % | HEIGHT: 61 IN | BODY MASS INDEX: 30.21 KG/M2 | WEIGHT: 160 LBS | RESPIRATION RATE: 16 BRPM | SYSTOLIC BLOOD PRESSURE: 124 MMHG

## 2021-11-18 DIAGNOSIS — R13.10 DYSPHAGIA, UNSPECIFIED: ICD-10-CM

## 2021-11-18 DIAGNOSIS — R05.9 COUGH, UNSPECIFIED: ICD-10-CM

## 2021-11-18 PROCEDURE — 99213 OFFICE O/P EST LOW 20 MIN: CPT

## 2021-11-24 ENCOUNTER — RX RENEWAL (OUTPATIENT)
Age: 81
End: 2021-11-24

## 2021-12-01 ENCOUNTER — RX RENEWAL (OUTPATIENT)
Age: 81
End: 2021-12-01

## 2021-12-01 RX ORDER — DULOXETINE HYDROCHLORIDE 30 MG/1
30 CAPSULE, DELAYED RELEASE PELLETS ORAL
Qty: 60 | Refills: 11 | Status: ACTIVE | COMMUNITY
Start: 2019-02-05 | End: 1900-01-01

## 2021-12-01 RX ORDER — ESCITALOPRAM OXALATE 10 MG/1
10 TABLET ORAL DAILY
Qty: 30 | Refills: 11 | Status: ACTIVE | COMMUNITY
Start: 2020-08-07 | End: 1900-01-01

## 2021-12-01 RX ORDER — OMEPRAZOLE 40 MG/1
40 CAPSULE, DELAYED RELEASE ORAL
Qty: 30 | Refills: 11 | Status: ACTIVE | COMMUNITY
Start: 2020-12-09 | End: 1900-01-01

## 2021-12-01 RX ORDER — DONEPEZIL HYDROCHLORIDE 5 MG/1
5 TABLET ORAL
Qty: 30 | Refills: 11 | Status: ACTIVE | COMMUNITY
Start: 2020-01-27 | End: 1900-01-01

## 2021-12-14 ENCOUNTER — RX RENEWAL (OUTPATIENT)
Age: 81
End: 2021-12-14

## 2021-12-27 ENCOUNTER — RX RENEWAL (OUTPATIENT)
Age: 81
End: 2021-12-27

## 2021-12-27 RX ORDER — FLUTICASONE PROPIONATE 50 UG/1
50 SPRAY, METERED NASAL TWICE DAILY
Qty: 1 | Refills: 0 | Status: ACTIVE | COMMUNITY
Start: 2021-12-27 | End: 1900-01-01

## 2022-01-04 PROBLEM — R13.10 DYSPHAGIA: Status: ACTIVE | Noted: 2022-01-04

## 2022-01-04 PROBLEM — R05.9 COUGH: Status: ACTIVE | Noted: 2020-04-22

## 2022-01-04 NOTE — PHYSICAL EXAM
[No Acute Distress] : no acute distress [Well Groomed] : well groomed [No Deformities] : no deformities [II] : Mallampati Class: II [Normal Appearance] : normal appearance [No Neck Mass] : no neck mass [Normal Rate/Rhythm] : normal rate/rhythm [Normal Pulses] : normal pulses [Normal S1, S2] : normal s1, s2 [No Murmurs] : no murmurs [No Resp Distress] : no resp distress [No Acc Muscle Use] : no acc muscle use [Normal Rhythm and Effort] : normal rhythm and effort [Clear to Auscultation Bilaterally] : clear to auscultation bilaterally [No Abnormalities] : no abnormalities [Benign] : benign [No Clubbing] : no clubbing [No Cyanosis] : no cyanosis [FROM] : FROM [Normal Color/ Pigmentation] : normal color/ pigmentation [Cranial Nerves Intact] : cranial nerves intact [Oriented x3] : oriented x3 [Normal Mood] : normal mood [Recent Memory Normal] : recent memory normal [Normal Insight/judgment] : normal insight/judgment [Normal Affect] : normal affect [Normal Oropharynx] : normal oropharynx [TextBox_80] : mild kyphosis [TextBox_99] : ambulates with left leg brace [TextBox_105] : +DIP and PIP joint changes bilaterally [TextBox_132] : left leg weakness

## 2022-01-04 NOTE — REVIEW OF SYSTEMS
[Ear Disturbance] : ear disturbance [Nasal Congestion] : nasal congestion [Postnasal Drip] : postnasal drip [Cough] : cough [Wheezing] : wheezing [Hay Fever] : hay fever [Seasonal Allergies] : seasonal allergies [Nasal Discharge] : nasal discharge [GERD] : gerd [Constipation] : constipation [Arthralgias] : arthralgias [Back Pain] : back pain [Chronic Pain] : chronic pain [Depression] : depression [Negative] : Endocrine [EDS] : no EDS [Dry Eyes] : no dry eyes [Epistaxis] : no epistaxis [Sore Throat] : no sore throat [Eye Irritation] : no eye irritation [Dry Mouth] : no dry mouth [Sinus Problems] : no sinus problems [Poor Dentition] : no poor dentition [Edentulous] : no edentulous [Hemoptysis] : no hemoptysis [Chest Tightness] : no chest tightness [Frequent URIs] : no frequent URIs [Sputum] : no sputum [Dyspnea] : no dyspnea [Pleuritic Pain] : no pleuritic pain [A.M. Dry Mouth] : no a.m. dry mouth [SOB on Exertion] : no sob on exertion [Watery Eyes] : no watery eyes [Itchy Eyes] : no itchy eyes [Hives] : no hives [Angioedema] : no angioedema [Immunocompromised] : not immunocompromised [Abdominal Pain] : no abdominal pain [Nausea] : no nausea [Vomiting] : no vomiting [Diarrhea] : no diarrhea [Food Intolerance] : no food intolerance [Hepatic Disease] : no hepatic disease [Trauma/ Injury] : no trauma/ injury [Anxiety] : no anxiety [TextBox_14] : per HPI [TextBox_122] : left foot weakness- uses brace

## 2022-01-04 NOTE — HISTORY OF PRESENT ILLNESS
[Never] : never [0  -  Nothing at all] : 0, nothing at all [Class I - No Symptoms and No Limitations] : I [TextBox_4] : 82 yo F presents for follow-up of cough.\par \par 9/2021: Initial evaluation of chronic cough. Referred by  and is accompanied by her two daughters Fátima and Analia. Lifelong nonsmoker, significant second hand smoke. Patient has a history of memory loss, depression, Charcot-pratik tooth disease, provoked PE in 2014 s/p 6 months of Eliquis, seasonal/environmental allergies with chronic nasal congestion, GERD who presents with a chronic nonproductive cough since March 2020. Associated with occasional wheeze - cough can occur at any time but more prominent when eating (especially liquids) and associated with audible wheezing. Denies dyspnea, denies chest pain or sputum production, weight loss. Was started on Symbicort and Claritin without improvement. \par Evaluated recently by Dr. Moreno- laryngoscopy showed e/o postnasal drip and she was started on Medrol Dosepack which she completed yesterday and Flonase, Zyrtec. Noted slight improvement with Medrol.\par +Acid reflux - in the past was on PPI, with improvement, now with recurrence of mild symptoms.  \par Denies cardiac history, no recent Echocardiography. \par Was tested for COVID 8/7/2020: negative nasal PCR and Igg antibody.\par Recent CXR performed at home was negative per report - do not have access to images.\par PFTs today: normal spirometry and lung volumes, mildly reduced DLCO 65\par \par Follow up OV 11/18/2021: \par Accompanied by her HHA.\par States she has occasional cough and wheeze - predominantly after eating, denies nocturnal symptoms. \par Seen by SLP 1 year ago - recommended soft diet with nectar thick liquids. States she is compliant with this.\par Also compliant with PPI for her GERD.\par Ambulates with walker - denies DANIELS, denies cough or wheezing when exerting herself.\par CT chest from 9/2020 was WNL. Denies pneumonia or significant respiratory illness over the past year. \par \par Quality metrics:\par Tobacco use- lifelong nonsmoker\par ESS- NA\par \par Vaccines: \par COVID: completed x3 - received booster today 11/18/21\par Influenza: has not received yet\par Pneumococcal: completed PCV 2016\par

## 2022-01-04 NOTE — CONSULT LETTER
[Dear  ___] : Dear  [unfilled], [Please see my note below.] : Please see my note below. [Consult Closing:] : Thank you very much for allowing me to participate in the care of this patient.  If you have any questions, please do not hesitate to contact me. [Sincerely,] : Sincerely, [Courtesy Letter:] : I had the pleasure of seeing your patient, [unfilled], in my office today. [FreeTextEntry2] : Dr. Carmen Amos [FreeTextEntry3] : Kim Mcdonough MD\par  \par Division of Pulmonary, Critical Care & Sleep Medicine\par [facility] [employer] [facility]\par \par \par

## 2022-01-04 NOTE — ASSESSMENT
[FreeTextEntry1] : 82 yo female with h/o dysphagia and chronic cough who presents for follow up.\par Chronic cough multifactorial, secondary to postnasal drip, GERD, dysphagia\par PFTs in the past with normal spirometry and lung volumes, mildly reduced DLCO. CT chest in Sept 2020 was within normal limits.\par \par Her physical examination is within normal limits and she has not had issues because of her cough such as infections or nocturnal symptoms- it is mostly with eating and she will continue to follow a dysphagia diet and maintain aspiration precautions.\par c/w Zyrtec and Flonase for upper airway cough syndrome\par GERD diet modifications and acid reducer as need (in the past was on PPI) \par Above discussed at length with the patient and her home health aid, all questions answered.

## 2022-01-24 ENCOUNTER — APPOINTMENT (OUTPATIENT)
Dept: CT IMAGING | Facility: HOSPITAL | Age: 82
End: 2022-01-24

## 2022-01-24 ENCOUNTER — OUTPATIENT (OUTPATIENT)
Dept: OUTPATIENT SERVICES | Facility: HOSPITAL | Age: 82
LOS: 1 days | End: 2022-01-24
Payer: MEDICARE

## 2022-01-24 DIAGNOSIS — J91.0 MALIGNANT PLEURAL EFFUSION: ICD-10-CM

## 2022-01-24 DIAGNOSIS — M67.90 UNSPECIFIED DISORDER OF SYNOVIUM AND TENDON, UNSPECIFIED SITE: Chronic | ICD-10-CM

## 2022-01-24 DIAGNOSIS — S42.301A UNSPECIFIED FRACTURE OF SHAFT OF HUMERUS, RIGHT ARM, INITIAL ENCOUNTER FOR CLOSED FRACTURE: Chronic | ICD-10-CM

## 2022-01-24 DIAGNOSIS — I26.99 OTHER PULMONARY EMBOLISM WITHOUT ACUTE COR PULMONALE: ICD-10-CM

## 2022-01-24 DIAGNOSIS — Z00.00 ENCOUNTER FOR GENERAL ADULT MEDICAL EXAMINATION WITHOUT ABNORMAL FINDINGS: ICD-10-CM

## 2022-01-24 PROCEDURE — 71275 CT ANGIOGRAPHY CHEST: CPT | Mod: MH

## 2022-01-24 PROCEDURE — 71275 CT ANGIOGRAPHY CHEST: CPT | Mod: 26,MH

## 2022-06-17 ENCOUNTER — EMERGENCY (EMERGENCY)
Facility: HOSPITAL | Age: 82
LOS: 1 days | Discharge: HOME | End: 2022-06-17
Attending: EMERGENCY MEDICINE
Payer: MEDICARE

## 2022-06-17 VITALS
OXYGEN SATURATION: 100 % | TEMPERATURE: 98 F | SYSTOLIC BLOOD PRESSURE: 122 MMHG | HEART RATE: 70 BPM | RESPIRATION RATE: 20 BRPM | DIASTOLIC BLOOD PRESSURE: 71 MMHG

## 2022-06-17 VITALS — HEIGHT: 63 IN

## 2022-06-17 DIAGNOSIS — S42.301A UNSPECIFIED FRACTURE OF SHAFT OF HUMERUS, RIGHT ARM, INITIAL ENCOUNTER FOR CLOSED FRACTURE: Chronic | ICD-10-CM

## 2022-06-17 DIAGNOSIS — M67.90 UNSPECIFIED DISORDER OF SYNOVIUM AND TENDON, UNSPECIFIED SITE: Chronic | ICD-10-CM

## 2022-06-17 LAB
ALBUMIN SERPL ELPH-MCNC: 3.9 G/DL — SIGNIFICANT CHANGE UP (ref 3.3–5)
ALP SERPL-CCNC: 188 U/L — HIGH (ref 40–120)
ALT FLD-CCNC: 30 U/L — SIGNIFICANT CHANGE UP (ref 10–45)
ANION GAP SERPL CALC-SCNC: 16 MMOL/L — SIGNIFICANT CHANGE UP (ref 5–17)
APPEARANCE UR: ABNORMAL
APTT BLD: 27.4 SEC — LOW (ref 27.5–35.5)
AST SERPL-CCNC: 34 U/L — SIGNIFICANT CHANGE UP (ref 10–40)
BACTERIA # UR AUTO: ABNORMAL
BASE EXCESS BLDV CALC-SCNC: 3.9 MMOL/L — HIGH (ref -2–2)
BASOPHILS # BLD AUTO: 0.07 K/UL — SIGNIFICANT CHANGE UP (ref 0–0.2)
BASOPHILS NFR BLD AUTO: 0.7 % — SIGNIFICANT CHANGE UP (ref 0–2)
BILIRUB SERPL-MCNC: 0.4 MG/DL — SIGNIFICANT CHANGE UP (ref 0.2–1.2)
BILIRUB UR-MCNC: NEGATIVE — SIGNIFICANT CHANGE UP
BLOOD GAS VENOUS - CREATININE: SIGNIFICANT CHANGE UP MG/DL (ref 0.5–1.3)
BUN SERPL-MCNC: 19 MG/DL — SIGNIFICANT CHANGE UP (ref 7–23)
CA-I SERPL-SCNC: 1.21 MMOL/L — SIGNIFICANT CHANGE UP (ref 1.15–1.33)
CALCIUM SERPL-MCNC: 9.3 MG/DL — SIGNIFICANT CHANGE UP (ref 8.4–10.5)
CHLORIDE BLDV-SCNC: 102 MMOL/L — SIGNIFICANT CHANGE UP (ref 96–108)
CHLORIDE SERPL-SCNC: 103 MMOL/L — SIGNIFICANT CHANGE UP (ref 96–108)
CO2 BLDV-SCNC: 33 MMOL/L — HIGH (ref 22–26)
CO2 SERPL-SCNC: 22 MMOL/L — SIGNIFICANT CHANGE UP (ref 22–31)
COLOR SPEC: YELLOW — SIGNIFICANT CHANGE UP
CREAT SERPL-MCNC: 0.54 MG/DL — SIGNIFICANT CHANGE UP (ref 0.5–1.3)
DIFF PNL FLD: NEGATIVE — SIGNIFICANT CHANGE UP
EGFR: 93 ML/MIN/1.73M2 — SIGNIFICANT CHANGE UP
EOSINOPHIL # BLD AUTO: 0.07 K/UL — SIGNIFICANT CHANGE UP (ref 0–0.5)
EOSINOPHIL NFR BLD AUTO: 0.7 % — SIGNIFICANT CHANGE UP (ref 0–6)
EPI CELLS # UR: SIGNIFICANT CHANGE UP
GAS PNL BLDV: 136 MMOL/L — SIGNIFICANT CHANGE UP (ref 136–145)
GAS PNL BLDV: SIGNIFICANT CHANGE UP
GAS PNL BLDV: SIGNIFICANT CHANGE UP
GLUCOSE BLDV-MCNC: 97 MG/DL — SIGNIFICANT CHANGE UP (ref 70–99)
GLUCOSE SERPL-MCNC: 73 MG/DL — SIGNIFICANT CHANGE UP (ref 70–99)
GLUCOSE UR QL: NEGATIVE — SIGNIFICANT CHANGE UP
HCO3 BLDV-SCNC: 32 MMOL/L — HIGH (ref 22–29)
HCT VFR BLD CALC: 44.6 % — SIGNIFICANT CHANGE UP (ref 34.5–45)
HCT VFR BLDA CALC: 43 % — SIGNIFICANT CHANGE UP (ref 34.5–46.5)
HGB BLD CALC-MCNC: 14.3 G/DL — SIGNIFICANT CHANGE UP (ref 11.7–16.1)
HGB BLD-MCNC: 13.9 G/DL — SIGNIFICANT CHANGE UP (ref 11.5–15.5)
HYALINE CASTS # UR AUTO: 0 /LPF — SIGNIFICANT CHANGE UP (ref 0–7)
IMM GRANULOCYTES NFR BLD AUTO: 0.4 % — SIGNIFICANT CHANGE UP (ref 0–1.5)
INR BLD: 0.99 RATIO — SIGNIFICANT CHANGE UP (ref 0.88–1.16)
KETONES UR-MCNC: NEGATIVE — SIGNIFICANT CHANGE UP
LACTATE BLDV-MCNC: 2.6 MMOL/L — HIGH (ref 0.7–2)
LEUKOCYTE ESTERASE UR-ACNC: ABNORMAL
LYMPHOCYTES # BLD AUTO: 2.93 K/UL — SIGNIFICANT CHANGE UP (ref 1–3.3)
LYMPHOCYTES # BLD AUTO: 29.8 % — SIGNIFICANT CHANGE UP (ref 13–44)
MCHC RBC-ENTMCNC: 31.2 GM/DL — LOW (ref 32–36)
MCHC RBC-ENTMCNC: 31.5 PG — SIGNIFICANT CHANGE UP (ref 27–34)
MCV RBC AUTO: 101.1 FL — HIGH (ref 80–100)
MONOCYTES # BLD AUTO: 0.7 K/UL — SIGNIFICANT CHANGE UP (ref 0–0.9)
MONOCYTES NFR BLD AUTO: 7.1 % — SIGNIFICANT CHANGE UP (ref 2–14)
NEUTROPHILS # BLD AUTO: 6.03 K/UL — SIGNIFICANT CHANGE UP (ref 1.8–7.4)
NEUTROPHILS NFR BLD AUTO: 61.3 % — SIGNIFICANT CHANGE UP (ref 43–77)
NITRITE UR-MCNC: NEGATIVE — SIGNIFICANT CHANGE UP
NRBC # BLD: 0 /100 WBCS — SIGNIFICANT CHANGE UP (ref 0–0)
PCO2 BLDV: 60 MMHG — HIGH (ref 39–42)
PH BLDV: 7.33 — SIGNIFICANT CHANGE UP (ref 7.32–7.43)
PH UR: 7 — SIGNIFICANT CHANGE UP (ref 5–8)
PLATELET # BLD AUTO: 227 K/UL — SIGNIFICANT CHANGE UP (ref 150–400)
PO2 BLDV: 30 MMHG — SIGNIFICANT CHANGE UP (ref 25–45)
POTASSIUM BLDV-SCNC: 4.2 MMOL/L — SIGNIFICANT CHANGE UP (ref 3.5–5.1)
POTASSIUM SERPL-MCNC: 3.8 MMOL/L — SIGNIFICANT CHANGE UP (ref 3.5–5.3)
POTASSIUM SERPL-SCNC: 3.8 MMOL/L — SIGNIFICANT CHANGE UP (ref 3.5–5.3)
PROT SERPL-MCNC: 6.7 G/DL — SIGNIFICANT CHANGE UP (ref 6–8.3)
PROT UR-MCNC: SIGNIFICANT CHANGE UP
PROTHROM AB SERPL-ACNC: 11.5 SEC — SIGNIFICANT CHANGE UP (ref 10.5–13.4)
RBC # BLD: 4.41 M/UL — SIGNIFICANT CHANGE UP (ref 3.8–5.2)
RBC # FLD: 13.6 % — SIGNIFICANT CHANGE UP (ref 10.3–14.5)
RBC CASTS # UR COMP ASSIST: 2 /HPF — SIGNIFICANT CHANGE UP (ref 0–4)
SAO2 % BLDV: 40.6 % — LOW (ref 67–88)
SARS-COV-2 RNA SPEC QL NAA+PROBE: SIGNIFICANT CHANGE UP
SODIUM SERPL-SCNC: 141 MMOL/L — SIGNIFICANT CHANGE UP (ref 135–145)
SP GR SPEC: 1.02 — SIGNIFICANT CHANGE UP (ref 1.01–1.02)
UROBILINOGEN FLD QL: NEGATIVE — SIGNIFICANT CHANGE UP
WBC # BLD: 9.84 K/UL — SIGNIFICANT CHANGE UP (ref 3.8–10.5)
WBC # FLD AUTO: 9.84 K/UL — SIGNIFICANT CHANGE UP (ref 3.8–10.5)
WBC UR QL: 1 /HPF — SIGNIFICANT CHANGE UP (ref 0–5)

## 2022-06-17 PROCEDURE — 99284 EMERGENCY DEPT VISIT MOD MDM: CPT | Mod: GC

## 2022-06-17 PROCEDURE — 70450 CT HEAD/BRAIN W/O DYE: CPT | Mod: 26,MA

## 2022-06-17 PROCEDURE — 93010 ELECTROCARDIOGRAM REPORT: CPT | Mod: GC

## 2022-06-17 PROCEDURE — 71045 X-RAY EXAM CHEST 1 VIEW: CPT | Mod: 26

## 2022-06-17 NOTE — ED PROVIDER NOTE - PATIENT PORTAL LINK FT
You can access the FollowMyHealth Patient Portal offered by [facility] by registering at the following website: [email or web address]. By joining [facility]’s FollowMyHealth portal, you will also be able to view your health information using other applications (apps) compatible with our system.

## 2022-06-17 NOTE — ED PROVIDER NOTE - PHYSICAL EXAMINATION
Gen: WDWN, NAD  HEENT: EOMI, no nasal discharge, mucous membranes moist  CV: RRR, +S1/S2, no M/R/G  Resp: CTAB, no W/R/R  GI: Abdomen soft non-distended, NTTP, no masses  MSK: No open wounds, no bruising, no LE edema  Neuro: CN2-12 grossly intact, A&Ox4, MS +5/5 in LUE and LLE BL, RUE minimal effort RLE 0/5, decreased sensation to R side   Psych: appropriate mood, affect for condition

## 2022-06-17 NOTE — ED ADULT NURSE NOTE - NSSEPSISNEWALTERMENTAL_ED_A_ED
From: Nikole Phipps  To: Jesse Shearer MD  Sent: 5/5/2020 10:12 AM CDT  Subject: Lab Test or Test Related Question    i finished my cefuroxime 250mg on May 1 , when should I go to the clinic for another test?    Nikole Phipps  
Patient completed her course of ceftin for her UTI. Symptoms have resolved.  She is wondering when/ if she should come in for a follow up UA.  
No

## 2022-06-17 NOTE — CONSULT NOTE ADULT - ASSESSMENT
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 Per family, pt was saying words in a random order and not making sense. No worsening of baseline weakness per patient and family, LUE drift is not new (2/2 dystrophy). Pt is on eliquis at home, for unknown reason, last dose this AM (unspecified time). Pt denies any recent HA, n/v, changes in vision/hearing, head trauma but has some neck pain. PT is not a TPA candidate as new symptoms have resolved. Cannot ambulate independently at baseline, needs help with activities.     NIHSS 7 (LUE drift (2/2 dystrophy), RUE some effort, LLE no drift, RLE no effort, moderate sensory loss in RUE/RLE)  preMRS 4    Neuro exam revealed as above.    Impression: new onset resolved dysarthria and aphasia possibly 2/2 TIA     Recommendations:  -Stroke work up can be done outpatient  -MRI brain w/o contrast, if no contraindications, can be done outpatient  -Neuro checks Q4  -Permissive HTN up to 220/110 for 24 hours from symptom onset, gradual normotension over 2-3 days  -TTE, can be done outpatient   -Continuous  telemetry to monitor for arrhythmia  -Stroke labwork: HgbA1C, fasting lipid panel, CBC, CMP, coag pannel, troponin  - Baseline EKG  -PT/OT/Dysphagia screen  -Speech and swallow eval if dysphagia screen fails  -NPO except meds until dysphagia screen passed  -Head of bed > 30 degrees for aspiration prevention and aspiration precautions ordered.  -STAT CT head non-contrast for change in neuro exam  -Fall precautions, aspiration precautions    Secondary prevention of stroke:  -PT can continue home eliquis, if no contraindications  -Atorvastatin 80 mg daily (long-term goal LDL < 70)  -Tight glucose control (long-term goal HgbA1c < 6%)    Prophylaxis: Lovenox 40 mg Sq daily unless contraindicated in which case SCDs     Case discussed with stroke fellow Dr. Saloni Lu under supervision of stroke attending Dr. 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 Per family, pt was saying words in a random order and not making sense. No worsening of baseline weakness per patient and family, LUE drift is not new (2/2 dystrophy). Pt is on eliquis at home, for unknown reason, last dose this AM (unspecified time). Pt denies any recent HA, n/v, changes in vision/hearing, head trauma but has some neck pain. PT is not a TPA candidate as new symptoms have resolved. Cannot ambulate independently at baseline, needs help with activities.     NIHSS 7 (LUE drift (2/2 dystrophy), RUE some effort, LLE no drift, RLE no effort, moderate sensory loss in RUE/RLE)  preMRS 4    Neuro exam revealed as above.    Impression: new onset resolved dysarthria and aphasia possibly 2/2 TIA     Recommendations:  -F/U CT head  -Stroke work up can be done outpatient if CT head negative for acute findings  -MRI brain w/o contrast, if no contraindications, can be done outpatient  -Neuro checks Q4  -Permissive HTN up to 220/110 for 24 hours from symptom onset, gradual normotension over 2-3 days  -TTE, can be done outpatient   -Continuous  telemetry to monitor for arrhythmia  -Stroke labwork: HgbA1C, fasting lipid panel, CBC, CMP, coag pannel, troponin  - Baseline EKG  -PT/OT/Dysphagia screen  -Speech and swallow eval if dysphagia screen fails  -NPO except meds until dysphagia screen passed  -Head of bed > 30 degrees for aspiration prevention and aspiration precautions ordered.  -STAT CT head non-contrast for change in neuro exam  -Fall precautions, aspiration precautions    Secondary prevention of stroke:  -PT can continue home eliquis, if no contraindications  -Atorvastatin 80 mg daily (long-term goal LDL < 70)  -Tight glucose control (long-term goal HgbA1c < 6%)    Prophylaxis: Lovenox 40 mg Sq daily unless contraindicated in which case SCDs     Case discussed with stroke fellow Dr. Saloni Lu under supervision of stroke attending Dr. 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 Per family, pt was saying words in a random order and not making sense. No worsening of baseline weakness per patient and family, LUE drift is not new (2/2 dystrophy). Pt is on eliquis at home, for unknown reason, last dose this AM (unspecified time). Pt denies any recent HA, n/v, changes in vision/hearing, head trauma but has some neck pain. PT is not a TPA candidate as new symptoms have resolved. Cannot ambulate independently at baseline, needs help with activities.     NIHSS 7 (LUE drift (2/2 dystrophy), RUE some effort, LLE no drift, RLE no effort, moderate sensory loss in RUE/RLE)  preMRS 4    Neuro exam revealed as above.    Impression: new onset resolved dysarthria and aphasia possibly 2/2 TIA     Recommendations:  -F/U CT head  -Stroke work up can be done outpatient if CT head negative for acute findings  -MRI brain w/o contrast, if no contraindications, can be done outpatient  -Neuro checks Q4  -Permissive HTN up to 220/110 for 24 hours from symptom onset, gradual normotension over 2-3 days  -TTE, can be done outpatient   -Continuous  telemetry to monitor for arrhythmia  -Stroke labwork: HgbA1C, fasting lipid panel, CBC, CMP, coag pannel, troponin  -toxic/metabolic/infectious w/u per primary team  - Baseline EKG  -PT/OT/Dysphagia screen  -Speech and swallow eval if dysphagia screen fails  -NPO except meds until dysphagia screen passed  -Head of bed > 30 degrees for aspiration prevention and aspiration precautions ordered.  -STAT CT head non-contrast for change in neuro exam  -Fall precautions, aspiration precautions    Secondary prevention of stroke:  -PT can continue home eliquis, if no contraindications  -Atorvastatin 80 mg daily (long-term goal LDL < 70)  -Tight glucose control (long-term goal HgbA1c < 6%)    Prophylaxis: Lovenox 40 mg Sq daily unless contraindicated in which case SCDs     Case discussed with stroke fellow Dr. Saloni Lu under supervision of stroke attending Dr. 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 Per family, pt was saying words in a random order and not making sense. No worsening of baseline weakness per patient and family, LUE drift is not new (2/2 dystrophy). Pt is on eliquis at home, for unknown reason, last dose this AM (unspecified time). Pt denies any recent HA, n/v, changes in vision/hearing, head trauma but has some neck pain. PT is not a TPA candidate as new symptoms have resolved. Cannot ambulate independently at baseline, needs help with activities.     NIHSS 7 (LUE drift (2/2 dystrophy), RUE some effort, LLE no drift, RLE no effort, moderate sensory loss in RUE/RLE)  preMRS 4    Neuro exam revealed as above.    Impression: new onset resolved dysarthria and aphasia possibly 2/2 TIA     Recommendations:  -F/U CT head  -Stroke work up can be done outpatient if CT head negative for acute findings  -MRI brain w/o contrast, if no contraindications, can be done outpatient  -Neuro checks Q4  -Permissive HTN up to 220/110 for 24 hours from symptom onset, gradual normotension over 2-3 days  -TTE, can be done outpatient   -Continuous  telemetry to monitor for arrhythmia  -Stroke labwork: HgbA1C, fasting lipid panel, CBC, CMP, coag pannel, troponin  -toxic/metabolic/infectious w/u per primary team  - Baseline EKG  -PT/OT/Dysphagia screen  -Speech and swallow eval if dysphagia screen fails  -NPO except meds until dysphagia screen passed  -Head of bed > 30 degrees for aspiration prevention and aspiration precautions ordered.  -STAT CT head non-contrast for change in neuro exam  -Fall precautions, aspiration precautions    Secondary prevention of stroke:  -PT can continue home eliquis, if no contraindications  -Atorvastatin 80 mg daily (long-term goal LDL < 70)  -Tight glucose control (long-term goal HgbA1c < 6%)    Prophylaxis: Lovenox 40 mg Sq daily unless contraindicated in which case SCDs     Patient can follow up with stroke  neurology at [address] [place] [address] after discharge. Please instruct the patient to call [phone] to schedule this appointment.     Case discussed with stroke fellow Dr. Saloni Lu under supervision of stroke attending Dr. Libman/Ana Rosa

## 2022-06-17 NOTE — ED PROVIDER NOTE - NSFOLLOWUPINSTRUCTIONS_ED_ALL_ED_FT
Please follow up with your primary care provider for further concerns you may have regarding your general health. Attached you will find your results from today's visit. Continue taking your medications as prescribed and keep your upcoming medical appointments. Please follow up with your primary care provider for further concerns you may have regarding your general health. Attached you will find your results from today's visit. Continue taking your medications as prescribed and keep your upcoming medical appointments.    Please follow up with a neurologist as well for an outpatient MRI of the brain.   [address] [place] [id number]  [phone]    -Please continue home eliquis  -If not already, please start the patient on Atorvastatin 80 milligrams once at night time (long-term goal LDL < 70).    Please return to the emergency department for any new symptoms and/or concerns.

## 2022-06-17 NOTE — ED PROVIDER NOTE - NSICDXPASTMEDICALHX_GEN_ALL_CORE_FT
PAST MEDICAL HISTORY:  Contracture of hand joint, right     Dementia     Dementia, senile with depression     Dysphagia Chronic cough, on thickened liquids    GERD (gastroesophageal reflux disease)     Glaucoma     Hearing loss     History of perforated ear drum right    Left foot drop     Muscular dystrophy     Osteoporosis     Pulmonary embolism s/p short-term anticoagulation 2013

## 2022-06-17 NOTE — ED ADULT NURSE NOTE - BOWEL SOUNDS LUQ
Attempted to contact the patient to inform her that her HPV co-testing was negative. No answer, unable to leave voicemail message for the patient to call the clinic back.   present

## 2022-06-17 NOTE — CONSULT NOTE ADULT - SUBJECTIVE AND OBJECTIVE BOX
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 Per family, pt was saying words in a random order and not making sense. No worsening of baseline weakness per patient and family, LUE drift is not new (2/2 dystrophy). Pt is on eliquis at home, for unknown reason, last dose this AM (unspecified time). Pt denies any recent HA, n/v, changes in vision/hearing, head trauma but has some neck pain. PT is not a TPA candidate as new symptoms have resolved. Cannot ambulate independently at baseline, needs help with activities.     NIHSS 7 (LUE drift (2/2 dystrophy), RUE some effort, LLE no drift, RLE no effort, moderate sensory loss in RUE/RLE)  preMRS 4    PAST MEDICAL & SURGICAL HISTORY:  Muscular dystrophy      Osteoporosis      GERD (gastroesophageal reflux disease)      Pulmonary embolism  s/p short-term anticoagulation 2013      Dysphagia  Chronic cough, on thickened liquids      Dementia, senile with depression      Dementia      Left foot drop      Glaucoma      Hearing loss      Contracture of hand joint, right      History of perforated ear drum  right      Tendon disorder  s/p tendon release in childhood      Right arm fracture        FAMILY HISTORY:  FH: throat cancer    FH: heart disease  father        SOCIAL HISTORY: SOCIAL HISTORY:     Marital Status: (  )   (x  ) Single  (  )   (  )      Occupation:      Lives: (  ) alone  (  ) with children   (  ) with spouse  (  ) with parents  (x  ) other:        MEDICATIONS  Home Medications:  Caltrate: 1 tab(s) orally once a day (20 Jan 2021 15:00)  donepezil 5 mg oral tablet: 1 tab(s) orally once a day (at bedtime) (20 Jan 2021 15:00)  DULoxetine 30 mg oral delayed release capsule: 1 cap(s) orally 2 times a day (20 Jan 2021 15:00)  escitalopram 10 mg oral tablet: 1 tab(s) orally once a day (20 Jan 2021 15:00)  esomeprazole 40 mg oral delayed release capsule: 1 cap(s) orally once a day (20 Jan 2021 15:00)  fluticasone 50 mcg/inh inhalation powder: 1 puff(s) inhaled 2 times a day (20 Jan 2021 15:00)  magnesium oxide: 2 tab(s) orally once a day (20 Jan 2021 15:00)  tramadol-acetaminophen 37.5 mg-325 mg oral tablet: 1 tab(s) orally 3 times a day (20 Jan 2021 15:00)  ZyrTEC 10 mg oral tablet: 1 tab(s) orally once a day (20 Jan 2021 15:00)      MEDICATIONS  (STANDING):    MEDICATIONS  (PRN):      ALLERGIES/INTOLERANCES:  Allergies  No Known Allergies    Intolerances      OBJECTIVE:  VITALS   Vital Signs Last 24 Hrs  T(C): --  T(F): --  HR: --  BP: --  BP(mean): --  RR: --  SpO2: --    PHYSICAL EXAM:  Neurological Exam:  MS: Awake, alert, oriented to self, location, month but not year. Normal affect. Follows all commands.    Language: Speech is clear, fluent with good repetition & comprehension (able to name objects)    CNs: PERRLA (R = 3mm, L = 3mm). VFF. EOMI no nystagmus, no diplopia. V1-3 intact to LT/pinprick, well developed masseter muscles b/l. No facial asymmetry b/l, Hearing grossly normal (rubbing fingers) b/l. Symmetric palate elevation in midline. Gag reflex deferred. Head turning & shoulder shrug intact b/l. Tongue midline, normal movements, no atrophy.    Motor: Normal muscle bulk & tone. No noticeable tremor or seizure. No pronator drift.  Drift in LUE, some effort in RUE, LLE no drift, RLE no effort against gravity    Sensation: Decreased to light touch in RUE and RLE.     Reflexes:              Biceps(C5)       BR(C6)     Triceps(C7)               Patellar(L4)    Achilles(S1)    Plantar Resp  R	2	          2	             2		        2		    2		Down   L	2	          2	             2		        2		    2		Down     Coordination: unable to test     Gait: Deferred    LABORATORY:  CBC                       13.9   9.84  )-----------( 227      ( 17 Jun 2022 11:37 )             44.6     Chem       LFTs   Coagulopathy   Lipid Panel   A1c   Cardiac enzymes     U/A   CSF  Immunological  Other    STUDIES & IMAGING:  Studies (EKG, EEG, EMG, etc):     Radiology (XR, CT, MR, U/S, TTE/AARON):

## 2022-06-17 NOTE — ED PROVIDER NOTE - PROGRESS NOTE DETAILS
Nita PGY3: no acute change on imaging, ua neg, pending rpt cmp will anticipate dc. Alonso Velázquez MD. w/u non actionable. pt back to baseline. explained results with pt and daughter at bedside. outpt MR per neuro Initial hx was per pt and aide John. Dtr arrived to ED and clarifies pt never had slurred or jumbled speech but was saying non-sensical things, like sayng things were on TV that were not there. Less concernign for stroke, more c/w delirium, added UA and CXR to r/o infection .

## 2022-06-17 NOTE — ED ADULT NURSE NOTE - NSIMPLEMENTINTERV_GEN_ALL_ED
Implemented All Fall with Harm Risk Interventions:  [place] to call system. Call bell, personal items and telephone within reach. Instruct patient to call for assistance. Room bathroom lighting operational. Non-slip footwear when patient is off stretcher. Physically safe environment: no spills, clutter or unnecessary equipment. Stretcher in lowest position, wheels locked, appropriate side rails in place. Provide visual cue, wrist band, yellow gown, etc. Monitor gait and stability. Monitor for mental status changes and reorient to person, place, and time. Review medications for side effects contributing to fall risk. Reinforce activity limits and safety measures with patient and family. Provide visual clues: red socks.

## 2022-06-17 NOTE — ED PROVIDER NOTE - ATTENDING CONTRIBUTION TO CARE
81F hx CVA w R sided weakness, Dementia, depression, glaucoma, muscular dystrophy, fracture right arm s/p surgery, lumbar compression fx, left foot drop 2/2 muscular dystrophy here from [facility] rehab for slurred speech, word finding difficulty onset yesterday Afternoon 1pm, no new motor or sensory findings. No HA, vision changes, NV. No CP or SOB. On exam pt is currently awake alert and at her neuro baseline w R sided UE and LE weakness> L. Some weakness to LUE and LLE ~4/5 w some tremulousness in LUE which aide states is chronic. No slurred speech or aphasia presently. Aide states sx were present until ~9AM today. Pt was activated as a code stroke in triage. LAbs, EKG, CTH, awaiting further neuro recs.

## 2022-06-17 NOTE — ED ADULT NURSE NOTE - OBJECTIVE STATEMENT
81 year old female presented to ED 81 year old female presented to ED via [facility] from [facility] rehab with c/o of slurred speech currently resolved, onset and last known normal yesterday 6/16/22 at 1300. pt currently at baseline, right arm and right leg weakness from previous CVA. pt denies CP, SOB, nausea/vomiting, numbness/tingling, fever, cough, chills, dizziness, headache, blurred vision, neuro intact. pt a&ox3, lung sounds clear, heart rate regular, abdomen soft nontender nondistended to palp. skin intact. IV in right hand 20G and patent placed by RN, IV in left hand 20G and patent placed by EMS. Will continue to monitor and assess while offering support and reassurance.

## 2022-06-17 NOTE — ED PROVIDER NOTE - OBJECTIVE STATEMENT
81F hx dementia, depression, glaucoma, muscular dystrophy, CVA in 11/2021 w/ residual R sided hemiparesis p/w reported slurred speech and aphasia since 1 am y/d, which resolved at 10 am tody. PT was not making sense with words randomly. No change in baseline weakness. On eliquis, last dose this am. No ha/vision change/n/v.

## 2022-06-17 NOTE — ED PROVIDER NOTE - CLINICAL SUMMARY MEDICAL DECISION MAKING FREE TEXT BOX
Nita PGY3: pt presenting as code stroke w/ reported dysarthria now resolved. Plan for stroke w/u, pt afebrile, infectious w/u, if neg anticipate dc.

## 2022-06-18 ENCOUNTER — INPATIENT (INPATIENT)
Facility: HOSPITAL | Age: 82
LOS: 1 days | Discharge: NURSING, REHAB OR LONG-TERM CARE | DRG: 690 | End: 2022-06-20
Attending: SPECIALIST | Admitting: SPECIALIST
Payer: MEDICARE

## 2022-06-18 VITALS
SYSTOLIC BLOOD PRESSURE: 116 MMHG | TEMPERATURE: 99 F | RESPIRATION RATE: 18 BRPM | OXYGEN SATURATION: 95 % | HEIGHT: 63 IN | HEART RATE: 86 BPM | DIASTOLIC BLOOD PRESSURE: 60 MMHG

## 2022-06-18 DIAGNOSIS — R47.01 APHASIA: ICD-10-CM

## 2022-06-18 DIAGNOSIS — M67.90 UNSPECIFIED DISORDER OF SYNOVIUM AND TENDON, UNSPECIFIED SITE: Chronic | ICD-10-CM

## 2022-06-18 DIAGNOSIS — S42.301A UNSPECIFIED FRACTURE OF SHAFT OF HUMERUS, RIGHT ARM, INITIAL ENCOUNTER FOR CLOSED FRACTURE: Chronic | ICD-10-CM

## 2022-06-18 PROCEDURE — 99285 EMERGENCY DEPT VISIT HI MDM: CPT | Mod: GC

## 2022-06-18 PROCEDURE — 70496 CT ANGIOGRAPHY HEAD: CPT | Mod: 26,MA

## 2022-06-18 PROCEDURE — 70498 CT ANGIOGRAPHY NECK: CPT | Mod: 26,MA

## 2022-06-18 RX ORDER — AZITHROMYCIN 500 MG/1
500 TABLET, FILM COATED ORAL ONCE
Refills: 0 | Status: COMPLETED | OUTPATIENT
Start: 2022-06-18 | End: 2022-06-18

## 2022-06-18 RX ORDER — ATORVASTATIN CALCIUM 80 MG/1
80 TABLET, FILM COATED ORAL AT BEDTIME
Refills: 0 | Status: DISCONTINUED | OUTPATIENT
Start: 2022-06-18 | End: 2022-06-20

## 2022-06-18 RX ADMIN — Medication 1 TABLET(S): at 19:13

## 2022-06-18 RX ADMIN — AZITHROMYCIN 500 MILLIGRAM(S): 500 TABLET, FILM COATED ORAL at 19:13

## 2022-06-18 NOTE — ED PROVIDER NOTE - OBJECTIVE STATEMENT
Nita PGY3: 81F hx dementia, depression, glaucoma, muscular dystrophy, CVA in 11/2021 w/ residual R hemiparesis sent to ER for slurred speech. Pt known to myself having been evaluated by myself yesterday, at that time pt's family reported slurred speech and aphasia that had lasted approx 1 day and resolved pta to ER y/d. No motor changes to baseline weakness. Pt on eliquis. Pt denied any HA/vision changes/n/v at that time. PT was evaluated as code stroke and w/ no acute findings on CTH was recommended for outpt stroke w/u. After pt dc Dr. Libman contacted ER and requested pt receive vessel imaging, and pt was returned to ER. Denies any new  symptoms or continued aphasia/new neurological sxs at this time.

## 2022-06-18 NOTE — ED POST DISCHARGE NOTE - RESULT SUMMARY
6/19/22- UCx 10-49 Proteus, pt came back and is currently admitted to neurology, spoke with resident and he will follow up the result with the stroke team.  Migdalia

## 2022-06-18 NOTE — ED PROVIDER NOTE - CROS ED NEURO POS
Patient Requesting a refill. Has scheduled a fu eunice t    Medication(s) for [employer] submitted for a refill request and is pending approval from the Provider. Caller has been advised that their call does not guarantee an immediate refill. This refill will be reviewed within 24-72 hours by a qualified provider who will determine whether he or she can refill the medication. Patient has contacted the pharmacy? No    Call Back Number: [phone]    Can a detailed message be left? Yes_No_---: Yes    Additional information:     Patient is completely out of medications    Patientâs preferred pharmacy has been noted and populated. [facility]/pharmacy #[id number] [facility], [address] [place] [address]  Phone: [phone] Fax: [phone]  . dysarthria

## 2022-06-18 NOTE — ED ADULT NURSE NOTE - CHIEF COMPLAINT QUOTE
slurred speech x3 days, seen here yesterday had CT and d/c'ed, called to come back by Dr. Libman for CTA or MRI  no current slurred speech

## 2022-06-18 NOTE — ED PROVIDER NOTE - PROGRESS NOTE DETAILS
rah pgy1: took patient in sign out @ 7pm. Still pending CTA for aphasia assessment as pt unable to do MRI in CDU. CTA not negative but inconclusive with 'granulation' on findings of unclear and ambiguous significance. Plan to admit to neuro. Discussed with patient, bedside aid and daughter Fátima on phone about plan - they are in agreement.

## 2022-06-18 NOTE — CONSULT NOTE ADULT - TIME BILLING
Main goal is to rule out any significant large artery occlusive disease.  Optimally would image extracranial and intracranial circulations.  I understand that MRI is not feasible.  In normal circumstances, CTA would be a reasonable option, but given IV contrast shortage, also reasonable to obtain carotid Doppler instead.  If there is any significant large artery stenosis, then plan would have to be reassessed.  If no significant large artery stenosis, then her "TIA" may have been due to cardioembolism, and she is already on an anticoagulant, so no changes in management and she would be cleared to return to nursing home.

## 2022-06-18 NOTE — ED PROVIDER NOTE - CLINICAL SUMMARY MEDICAL DECISION MAKING FREE TEXT BOX
Attending MD Flores : Patient sent here by neurologist after TIA yestreday concerning for possible vessel compromise leading to TIA. recommending CTA at this time. Will obtain CTA and neuro eval. Disposition pending the above.

## 2022-06-18 NOTE — ED ADULT TRIAGE NOTE - ADDITIONAL SAFETY/BANDS...
Additional Safety/Bands:
Normal vision: sees adequately in most situations; can see medication labels, newsprint

## 2022-06-18 NOTE — ED ADULT NURSE NOTE - OBJECTIVE STATEMENT
81 yr old female with h/o  dementia, depression, glaucoma, muscular dystrophy, CVA with  R hemiparesis sent to ER for slurred speech Pt states that she had  slurred speech and aphasia that had lasted approx 1 day. No slurred speech at this time.  Pt on Eliquis. Denies ha or blurry or change in vision . Pt awake alert and orientedx4 GCS 15. Responds approp to verbal and tactile stimuli. 81 yr old female , from [facility], with h/o  dementia, depression, glaucoma, muscular dystrophy, CVA with  R hemiparesis sent to ER for slurred speech Pt states that she had  slurred speech and aphasia that had lasted approx 1 day. No slurred speech at this time.  Pt on Eliquis. Denies ha or blurry or change in vision . Pt awake alert and orientedx4 GCS 15. Responds approp to verbal and tactile stimuli. Pt seen and recalled for MRI or CTA per Dr Libman.

## 2022-06-18 NOTE — ED POST DISCHARGE NOTE - ADDITIONAL DOCUMENTATION
received notice from Dr. Hopper that Stroke Attending Dr. Libman would like patient to return back to the hospital ASA for vessel imaging (either CTA vs MRA brain and neck) as this was not done while patient was here in hospital. pt's daughter contacted as patient has dementia and she was with patient while here in hospital. informed her, was ok with plan for mother to return back, however, she is at [facility] facility. [facility] called, spoke with pt's RN (Cristino) informed her of need for patient to be sent back here for further evaluation with either CTA vs MRA, RN understands and agrees to send patient back. received notice from Dr. Hopper that Stroke Attending Dr. Libman would like patient to return back to the hospital ASA for vessel imaging (either CTA vs MRA brain and neck) as this was not done while patient was here in hospital. pt's daughter contacted as patient has dementia and she was with patient while here in hospital. informed her, was ok with plan for mother to return back, however, she is at [facility]. [facility] called, spoke with pt's RN (Cristino) informed her of need for patient to be sent back here for further evaluation with either CTA vs MRA, RN understands and will speak with her attending and inform him/her that we are requesting pt to be  sent patient back.

## 2022-06-18 NOTE — CONSULT NOTE ADULT - SUBJECTIVE AND OBJECTIVE BOX
Neurology Consultation     HPI: Patient SL is a 82 yo F PMH of Dementia, depression, glaucoma, muscular dystrophy, CVA in 2021 with residual R sided hemiparesis from nursing facility who presented  as a code stroke for new slurred speech and aphasia since 1 pm day prior with symptom resolution. At that time, per family, pt was saying words in a random order and not making sense. No worsening of baseline weakness per patient and family, LUE drift is not new (2/2 dystrophy). Pt is on eliquis at home, for unknown reason. Pt denies any recent HA, n/v, changes in vision/hearing, head trauma but has some neck pain.  Cannot ambulate independently at baseline, needs help with activities. NIHSS 7 (LUE drift (2/2 dystrophy), RUE some effort, LLE no drift, RLE no effort, moderate sensory loss in RUE/RLE). PreMRS 4. The evaluation at that time was new onset resolved dysarthria and aphasia possibly 2/2 TIA. CT head then showed no acute intracranial hemorrhage. 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 Patient was sent out from the ED. Stroke neuro attending Dr Richard Libman requested patient come to ED to obtain circulation vessel imaging.     (Stroke only)  NIHSS:   preMRS:     PAST MEDICAL & SURGICAL HISTORY:  Muscular dystrophy    Osteoporosis    GERD (gastroesophageal reflux disease)    Pulmonary embolism  s/p short-term anticoagulation     Dysphagia  Chronic cough, on thickened liquids    Dementia, senile with depression    Dementia    Left foot drop    Glaucoma    Hearing loss    Contracture of hand joint, right    History of perforated ear drum  right    Tendon disorder  s/p tendon release in childhood    Right arm fracture    FAMILY HISTORY:  FH: throat cancer    FH: heart disease  father    SOCIAL HISTORY:     MEDICATIONS (HOME):  Home Medications:  Caltrate: 1 tab(s) orally once a day (2021 15:00)  donepezil 5 mg oral tablet: 1 tab(s) orally once a day (at bedtime) (2021 15:00)  DULoxetine 30 mg oral delayed release capsule: 1 cap(s) orally 2 times a day (2021 15:00)  escitalopram 10 mg oral tablet: 1 tab(s) orally once a day (2021 15:00)  esomeprazole 40 mg oral delayed release capsule: 1 cap(s) orally once a day (2021 15:00)  fluticasone 50 mcg/inh inhalation powder: 1 puff(s) inhaled 2 times a day (2021 15:00)  magnesium oxide: 2 tab(s) orally once a day (2021 15:00)  tramadol-acetaminophen 37.5 mg-325 mg oral tablet: 1 tab(s) orally 3 times a day (2021 15:00)  ZyrTEC 10 mg oral tablet: 1 tab(s) orally once a day (2021 15:00)    MEDICATIONS  (STANDING):    MEDICATIONS  (PRN):    ALLERGIES/INTOLERANCES:  Allergies  No Known Allergies    Intolerances    VITALS & EXAMINATION:  Vital Signs Last 24 Hrs  T(C): 37.1 (2022 17:14), Max: 37.1 (2022 17:14)  T(F): 98.7 (2022 17:14), Max: 98.7 (2022 17:14)  HR: 86 (2022 17:14) (86 - 86)  BP: 116/60 (2022 17:14) (116/60 - 116/60)  BP(mean): --  RR: 18 (2022 17:14) (18 - 18)  SpO2: 95% (2022 17:14) (95% - 95%)         LABORATORY:  CBC                       13.9   9.84  )-----------( 227      ( 2022 11:37 )             44.6     Chem -17    141  |  103  |  19  ----------------------------<  73  3.8   |  22  |  0.54    Ca    9.3      2022 14:24    TPro  6.7  /  Alb  3.9  /  TBili  0.4  /  DBili  x   /  AST  34  /  ALT  30  /  AlkPhos  188<H>  06-17    LFTs LIVER FUNCTIONS - ( 2022 14:24 )  Alb: 3.9 g/dL / Pro: 6.7 g/dL / ALK PHOS: 188 U/L / ALT: 30 U/L / AST: 34 U/L / GGT: x           Coagulopathy PT/INR - ( 2022 12:14 )   PT: 11.5 sec;   INR: 0.99 ratio      PTT - ( 2022 12:14 )  PTT:27.4 sec  Lipid Panel   A1c   Cardiac enzymes     U/A Urinalysis Basic - ( 2022 13:35 )    Color: Yellow / Appearance: Turbid / S.020 / pH: x  Gluc: x / Ketone: Negative  / Bili: Negative / Urobili: Negative   Blood: x / Protein: Trace / Nitrite: Negative   Leuk Esterase: Small / RBC: 2 /hpf / WBC 1 /HPF   Sq Epi: x / Non Sq Epi: Few / Bacteria: Many    CSF  Other    STUDIES & IMAGING: (EEG, CT, MR, U/S, TTE/AARON):    ACC: [id number] EXAM:  CT BRAIN STROKE PROTOCOL                          PROCEDURE DATE:  2022      INTERPRETATION:  .    CLINICAL INFORMATION: Code stroke.    TECHNIQUE: Multiple axial CT images of the head were obtained without   contrast. Sagittal and coronal reconstructed images were acquired from   the source data.    COMPARISON: Prior CT examination of the head dated 4/3/2016.    FINDINGS: A chronic appearing lacunar infarct is noted within the   posterior limb of the internal capsule which appears new when compared to   the prior CT exam. There is also associated wallerian degeneration of the   left corticospinal tract.    There is no acute intracranial hemorrhage, mass effect, shift of the   midline structures, or herniation.    Mild ventriculomegaly appears grossly unchanged.    There is diffuse cerebral volume loss with prominence of the sulci,   fissures, and cisternal spaces which is normal for the patient's age.   There is mild periventricular white matter hypoattenuation statistically   compatible with microvascular changes given calcific atherosclerotic   disease of the intracranial arteries.    The paranasal sinuses and mastoid air cells are clear. The calvarium is   intact. There has been interval right-sided cataract removal when   compared to the prior exam. The left orbit appears unremarkable.    IMPRESSION: No acute intracranial hemorrhage.    Chronic lacunar infarct within the posterior limb of the left external   capsule with associated wallerian degeneration of the left corticospinal   tract. Findings are new when compared with 4/3/2016.    --- End of Report ---    ROBLES GUZMAN MD; Attending Radiologist  This document has been electronically signed. 2022 11:59AM   Neurology Consultation     HPI: Patient SL is a 82 yo F PMH of Dementia, depression, glaucoma, muscular dystrophy, CVA in 2021 with residual R sided hemiparesis from nursing facility who presented  as a code stroke for new slurred speech and aphasia since 1 pm day prior with symptom resolution. At that time, per family, pt was saying words in a random order and not making sense. No worsening of baseline weakness per patient and family, LUE drift is not new (2/2 dystrophy). Pt is on eliquis at home, for unknown reason. Pt denies any recent HA, n/v, changes in vision/hearing, head trauma but has some neck pain.  Cannot ambulate independently at baseline, needs help with activities. NIHSS 7 (LUE drift (2/2 dystrophy), RUE some effort, LLE no drift, RLE no effort, moderate sensory loss in RUE/RLE). PreMRS 4. The evaluation at that time was new onset resolved dysarthria and aphasia possibly 2/2 TIA. CT head then showed no acute intracranial hemorrhage. 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 Patient was sent out from the ED. Stroke neuro attending Dr Richard Libman requested patient come to ED to obtain circulation vessel imaging.     (Stroke only)  NIHSS:   preMRS: 4    PAST MEDICAL & SURGICAL HISTORY:  Muscular dystrophy    Osteoporosis    GERD (gastroesophageal reflux disease)    Pulmonary embolism  s/p short-term anticoagulation     Dysphagia  Chronic cough, on thickened liquids    Dementia, senile with depression    Dementia    Left foot drop    Glaucoma    Hearing loss    Contracture of hand joint, right    History of perforated ear drum  right    Tendon disorder  s/p tendon release in childhood    Right arm fracture    FAMILY HISTORY:  FH: throat cancer    FH: heart disease  father    SOCIAL HISTORY:     MEDICATIONS (HOME):  Home Medications:  Caltrate: 1 tab(s) orally once a day (2021 15:00)  donepezil 5 mg oral tablet: 1 tab(s) orally once a day (at bedtime) (2021 15:00)  DULoxetine 30 mg oral delayed release capsule: 1 cap(s) orally 2 times a day (2021 15:00)  escitalopram 10 mg oral tablet: 1 tab(s) orally once a day (2021 15:00)  esomeprazole 40 mg oral delayed release capsule: 1 cap(s) orally once a day (2021 15:00)  fluticasone 50 mcg/inh inhalation powder: 1 puff(s) inhaled 2 times a day (2021 15:00)  magnesium oxide: 2 tab(s) orally once a day (2021 15:00)  tramadol-acetaminophen 37.5 mg-325 mg oral tablet: 1 tab(s) orally 3 times a day (2021 15:00)  ZyrTEC 10 mg oral tablet: 1 tab(s) orally once a day (2021 15:00)    MEDICATIONS  (STANDING):    MEDICATIONS  (PRN):    ALLERGIES/INTOLERANCES:  Allergies  No Known Allergies    Intolerances    VITALS & EXAMINATION:  Vital Signs Last 24 Hrs  T(C): 37.1 (2022 17:14), Max: 37.1 (2022 17:14)  T(F): 98.7 (2022 17:14), Max: 98.7 (2022 17:14)  HR: 86 (2022 17:14) (86 - 86)  BP: 116/60 (2022 17:14) (116/60 - 116/60)  BP(mean): --  RR: 18 (2022 17:14) (18 - 18)  SpO2: 95% (2022 17:14) (95% - 95%)     General: comfortable appearing, awake, alert    PHYSICAL EXAM:  Neurological Exam:  MS: Awake, alert, oriented to self, location, month but not year. Normal affect. Follows all commands.    Language: Speech is clear, fluent with good repetition & comprehension (able to name objects)    CNs: PERRLA (R = 3mm, L = 3mm). VFF. EOMI no nystagmus, no diplopia. V1-3 intact to LT/pinprick, well developed masseter muscles b/l. No facial asymmetry b/l, Hearing grossly normal (rubbing fingers) b/l. Symmetric palate elevation in midline. Gag reflex deferred. Head turning & shoulder shrug intact b/l. Tongue midline, normal movements, no atrophy.    Motor: Normal muscle bulk & tone. No noticeable tremor or seizure. No pronator drift.  Drift in LUE, some effort in RUE, LLE no drift, RLE no effort against gravity    Sensation: Decreased to light touch in RUE and RLE.     Reflexes:              Biceps(C5)       BR(C6)     Triceps(C7)               Patellar(L4)    Achilles(S1)    Plantar Resp  R	2	          2	             2		        2		    2		Down   L	2	          2	             2		        2		    2		Down     Coordination: unable to test     Gait: Deferred    LABORATORY:  CBC                       13.9   9.84  )-----------( 227      ( 2022 11:37 )             44.6     Chem     141  |  103  |  19  ----------------------------<  73  3.8   |  22  |  0.54    Ca    9.3      2022 14:24    TPro  6.7  /  Alb  3.9  /  TBili  0.4  /  DBili  x   /  AST  34  /  ALT  30  /  AlkPhos  188<H>      LFTs LIVER FUNCTIONS - ( 2022 14:24 )  Alb: 3.9 g/dL / Pro: 6.7 g/dL / ALK PHOS: 188 U/L / ALT: 30 U/L / AST: 34 U/L / GGT: x           Coagulopathy PT/INR - ( 2022 12:14 )   PT: 11.5 sec;   INR: 0.99 ratio      PTT - ( 2022 12:14 )  PTT:27.4 sec  Lipid Panel   A1c   Cardiac enzymes     U/A Urinalysis Basic - ( 2022 13:35 )    Color: Yellow / Appearance: Turbid / S.020 / pH: x  Gluc: x / Ketone: Negative  / Bili: Negative / Urobili: Negative   Blood: x / Protein: Trace / Nitrite: Negative   Leuk Esterase: Small / RBC: 2 /hpf / WBC 1 /HPF   Sq Epi: x / Non Sq Epi: Few / Bacteria: Many    CSF  Other    STUDIES & IMAGING: (EEG, CT, MR, U/S, TTE/AARON):    ACC: [id number] EXAM:  CT BRAIN STROKE PROTOCOL                          PROCEDURE DATE:  2022      INTERPRETATION:  .    CLINICAL INFORMATION: Code stroke.    TECHNIQUE: Multiple axial CT images of the head were obtained without   contrast. Sagittal and coronal reconstructed images were acquired from   the source data.    COMPARISON: Prior CT examination of the head dated 4/3/2016.    FINDINGS: A chronic appearing lacunar infarct is noted within the   posterior limb of the internal capsule which appears new when compared to   the prior CT exam. There is also associated wallerian degeneration of the   left corticospinal tract.    There is no acute intracranial hemorrhage, mass effect, shift of the   midline structures, or herniation.    Mild ventriculomegaly appears grossly unchanged.    There is diffuse cerebral volume loss with prominence of the sulci,   fissures, and cisternal spaces which is normal for the patient's age.   There is mild periventricular white matter hypoattenuation statistically   compatible with microvascular changes given calcific atherosclerotic   disease of the intracranial arteries.    The paranasal sinuses and mastoid air cells are clear. The calvarium is   intact. There has been interval right-sided cataract removal when   compared to the prior exam. The left orbit appears unremarkable.    IMPRESSION: No acute intracranial hemorrhage.    Chronic lacunar infarct within the posterior limb of the left external   capsule with associated wallerian degeneration of the left corticospinal   tract. Findings are new when compared with 4/3/2016.    --- End of Report ---    ROBLES GUZMAN MD; Attending Radiologist  This document has been electronically signed. 2022 11:59AM   Neurology Consultation   Spoke with  [id number]  HPI: Patient SL is a 82 yo F PMH of Dementia, depression, glaucoma, muscular dystrophy, CVA in 2021 with residual R sided hemiparesis from nursing facility who presented  as a code stroke for new slurred speech and aphasia since 1 pm day prior with symptom resolution. At that time, per family, pt was saying words in a random order and not making sense. No worsening of baseline weakness per patient and family, LUE drift is not new (2/2 dystrophy). Pt is on eliquis at home, for unknown reason. Pt denies any recent HA, n/v, changes in vision/hearing, head trauma but has some neck pain.  Cannot ambulate independently at baseline, needs help with activities. NIHSS 7 (LUE drift (2/2 dystrophy), RUE some effort, LLE no drift, RLE no effort, moderate sensory loss in RUE/RLE). PreMRS 4. The evaluation at that time was new onset resolved dysarthria and aphasia possibly 2/2 TIA. CT head then showed no acute intracranial hemorrhage. 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 Patient was sent out from the ED. Stroke neuro attending Dr Richard Libman requested patient come to ED to obtain circulation vessel imaging. Here, patient and daughter report no new worsening symptoms since before. Has been having L sided lower back pain limiting LLE movement.    (Stroke only)  NIHSS: 10  (LUE +1, RUE +3, LLE +1, RLE +4, sensory +1)  pain limited, incr weakness in RUE and LLE per NIHSS but pt/family state no new deficits  preMRS: 4    PAST MEDICAL & SURGICAL HISTORY:  Muscular dystrophy    Osteoporosis    GERD (gastroesophageal reflux disease)    Pulmonary embolism  s/p short-term anticoagulation     Dysphagia  Chronic cough, on thickened liquids    Dementia, senile with depression    Dementia    Left foot drop    Glaucoma    Hearing loss    Contracture of hand joint, right    History of perforated ear drum  right    Tendon disorder  s/p tendon release in childhood    Right arm fracture    FAMILY HISTORY:  FH: throat cancer    FH: heart disease  father    MEDICATIONS (HOME):  Home Medications:  Caltrate: 1 tab(s) orally once a day (2021 15:00)  donepezil 5 mg oral tablet: 1 tab(s) orally once a day (at bedtime) (2021 15:00)  DULoxetine 30 mg oral delayed release capsule: 1 cap(s) orally 2 times a day (2021 15:00)  escitalopram 10 mg oral tablet: 1 tab(s) orally once a day (2021 15:00)  esomeprazole 40 mg oral delayed release capsule: 1 cap(s) orally once a day (2021 15:00)  fluticasone 50 mcg/inh inhalation powder: 1 puff(s) inhaled 2 times a day (2021 15:00)  magnesium oxide: 2 tab(s) orally once a day (2021 15:00)  tramadol-acetaminophen 37.5 mg-325 mg oral tablet: 1 tab(s) orally 3 times a day (2021 15:00)  ZyrTEC 10 mg oral tablet: 1 tab(s) orally once a day (2021 15:00)    MEDICATIONS  (STANDING):    MEDICATIONS  (PRN):    ALLERGIES/INTOLERANCES:  Allergies  No Known Allergies    Intolerances    VITALS & EXAMINATION:  Vital Signs Last 24 Hrs  T(C): 37.1 (2022 17:14), Max: 37.1 (2022 17:14)  T(F): 98.7 (2022 17:14), Max: 98.7 (2022 17:14)  HR: 86 (2022 17:14) (86 - 86)  BP: 116/60 (2022 17:14) (116/60 - 116/60)  BP(mean): --  RR: 18 (2022 17:14) (18 - 18)  SpO2: 95% (2022 17:14) (95% - 95%)     General: comfortable appearing, awake, alert  Head: normocephalic  Skin: chronic changes  Resp: breathing comfortably    PHYSICAL EXAM:  Neurological Exam:  MS: Awake, alert, oriented to self, location, month but not year. Normal affect. Follows all commands.  Language: Speech is clear, fluent with good repetition & comprehension (able to name objects)    CNs: PERRLA (R = 3mm, L = 3mm). VFF. EOMI no nystagmus, no diplopia. V1-3 mildly focal decreased on R face due to mild swelling. No facial asymmetry b/l, Hearing grossly normal (rubbing fingers) b/l. Symmetric palate elevation in midline. Gag reflex deferred. Head turning & shoulder shrug intact b/l. Tongue midline, normal movements, no atrophy.    Motor: Normal muscle bulk & tone. No noticeable tremor or seizure.  LUE: 4+ shoulder flex, elbow flex 4 ext 4,  4  RUE: 0 shoulder flex, 0 elbow flex and ext,  2   RLE: 0 hip flex, 0 dorsi/plantar flex ankle  LLE: 3 hip flex, 4+ knee ext, dorsi/plantarflex 2 (limited due to L sided back pain w/ radiation)   Sensation: Decreased to light touch in RUE and RLE.   Coordination: unable to test   Gait: unable to test    LABORATORY:  CBC                       13.9   9.84  )-----------( 227      ( 2022 11:37 )             44.6     Chem     141  |  103  |  19  ----------------------------<  73  3.8   |  22  |  0.54    Ca    9.3      2022 14:24    TPro  6.7  /  Alb  3.9  /  TBili  0.4  /  DBili  x   /  AST  34  /  ALT  30  /  AlkPhos  188<H>  17    LFTs LIVER FUNCTIONS - ( 2022 14:24 )  Alb: 3.9 g/dL / Pro: 6.7 g/dL / ALK PHOS: 188 U/L / ALT: 30 U/L / AST: 34 U/L / GGT: x           Coagulopathy PT/INR - ( 2022 12:14 )   PT: 11.5 sec;   INR: 0.99 ratio      PTT - ( 2022 12:14 )  PTT:27.4 sec  Lipid Panel   A1c   Cardiac enzymes     U/A Urinalysis Basic - ( 2022 13:35 )    Color: Yellow / Appearance: Turbid / S.020 / pH: x  Gluc: x / Ketone: Negative  / Bili: Negative / Urobili: Negative   Blood: x / Protein: Trace / Nitrite: Negative   Leuk Esterase: Small / RBC: 2 /hpf / WBC 1 /HPF   Sq Epi: x / Non Sq Epi: Few / Bacteria: Many    CSF  Other    STUDIES & IMAGING: (EEG, CT, MR, U/S, TTE/AARON):    ACC: [id number] EXAM:  CT BRAIN STROKE PROTOCOL                          PROCEDURE DATE:  2022      INTERPRETATION:  .    CLINICAL INFORMATION: Code stroke.    TECHNIQUE: Multiple axial CT images of the head were obtained without   contrast. Sagittal and coronal reconstructed images were acquired from   the source data.    COMPARISON: Prior CT examination of the head dated 4/3/2016.    FINDINGS: A chronic appearing lacunar infarct is noted within the   posterior limb of the internal capsule which appears new when compared to   the prior CT exam. There is also associated wallerian degeneration of the   left corticospinal tract.    There is no acute intracranial hemorrhage, mass effect, shift of the   midline structures, or herniation.    Mild ventriculomegaly appears grossly unchanged.    There is diffuse cerebral volume loss with prominence of the sulci,   fissures, and cisternal spaces which is normal for the patient's age.   There is mild periventricular white matter hypoattenuation statistically   compatible with microvascular changes given calcific atherosclerotic   disease of the intracranial arteries.    The paranasal sinuses and mastoid air cells are clear. The calvarium is   intact. There has been interval right-sided cataract removal when   compared to the prior exam. The left orbit appears unremarkable.    IMPRESSION: No acute intracranial hemorrhage.    Chronic lacunar infarct within the posterior limb of the left external   capsule with associated wallerian degeneration of the left corticospinal   tract. Findings are new when compared with 4/3/2016.    --- End of Report ---    ROBLES GUZMAN MD; Attending Radiologist  This document has been electronically signed. 2022 11:59AM   Neurology Consultation   Spoke with  [id number]  HPI: Patient SL is a 82 yo F PMH of Dementia, depression, glaucoma, muscular dystrophy, CVA in 2021 with residual R sided hemiparesis from nursing facility who presented  as a code stroke for new slurred speech and aphasia since 1 pm day prior with symptom resolution. At that time, per family, pt was saying words in a random order and not making sense. No worsening of baseline weakness per patient and family, LUE drift is not new (2/2 dystrophy). Pt is on eliquis at home, for unknown reason. Pt denies any recent HA, n/v, changes in vision/hearing, head trauma but has some neck pain.  Cannot ambulate independently at baseline, needs help with activities. NIHSS 7 (LUE drift (2/2 dystrophy), RUE some effort, LLE no drift, RLE no effort, moderate sensory loss in RUE/RLE). PreMRS 4. The evaluation at that time was new onset resolved dysarthria and aphasia possibly 2/2 TIA. CT head then showed no acute intracranial hemorrhage. 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 Patient was sent out from the ED. Stroke neuro attending Dr Richard Libman requested patient come to ED to obtain circulation vessel imaging. Here, patient and aide report no new worsening symptoms since before. Has been having L sided lower back pain limiting LLE movement. Confirmed with daughter patient taking eliquis at facility. Unclear etiology of stroke.     (Stroke only)  NIHSS: 10  (LUE +1, RUE +3, LLE +1, RLE +4, sensory +1)  pain limited, incr weakness in RUE and LLE per NIHSS but pt/family state no new deficits  preMRS: 4    PAST MEDICAL & SURGICAL HISTORY:  Muscular dystrophy    Osteoporosis    GERD (gastroesophageal reflux disease)    Pulmonary embolism  s/p short-term anticoagulation     Dysphagia  Chronic cough, on thickened liquids    Dementia, senile with depression    Dementia    Left foot drop    Glaucoma    Hearing loss    Contracture of hand joint, right    History of perforated ear drum  right    Tendon disorder  s/p tendon release in childhood    Right arm fracture    FAMILY HISTORY:  FH: throat cancer    FH: heart disease  father    MEDICATIONS (HOME):  Home Medications:  Caltrate: 1 tab(s) orally once a day (2021 15:00)  donepezil 5 mg oral tablet: 1 tab(s) orally once a day (at bedtime) (2021 15:00)  DULoxetine 30 mg oral delayed release capsule: 1 cap(s) orally 2 times a day (2021 15:00)  escitalopram 10 mg oral tablet: 1 tab(s) orally once a day (2021 15:00)  esomeprazole 40 mg oral delayed release capsule: 1 cap(s) orally once a day (2021 15:00)  fluticasone 50 mcg/inh inhalation powder: 1 puff(s) inhaled 2 times a day (2021 15:00)  magnesium oxide: 2 tab(s) orally once a day (2021 15:00)  tramadol-acetaminophen 37.5 mg-325 mg oral tablet: 1 tab(s) orally 3 times a day (2021 15:00)  ZyrTEC 10 mg oral tablet: 1 tab(s) orally once a day (2021 15:00)    MEDICATIONS  (STANDING):    MEDICATIONS  (PRN):    ALLERGIES/INTOLERANCES:  Allergies  No Known Allergies    Intolerances    VITALS & EXAMINATION:  Vital Signs Last 24 Hrs  T(C): 37.1 (2022 17:14), Max: 37.1 (2022 17:14)  T(F): 98.7 (2022 17:14), Max: 98.7 (2022 17:14)  HR: 86 (2022 17:14) (86 - 86)  BP: 116/60 (2022 17:14) (116/60 - 116/60)  BP(mean): --  RR: 18 (2022 17:14) (18 - 18)  SpO2: 95% (2022 17:14) (95% - 95%)     General: comfortable appearing, awake, alert  Head: normocephalic  Skin: chronic changes  Resp: breathing comfortably    PHYSICAL EXAM:  Neurological Exam:  MS: Awake, alert, oriented to self, location, month but not year. Normal affect. Follows all commands.  Language: Speech is clear, fluent with good repetition & comprehension (able to name objects)    CNs: PERRLA (R = 3mm, L = 3mm). VFF. EOMI no nystagmus, no diplopia. V1-3 mildly focal decreased on R face due to mild swelling. No facial asymmetry b/l, Hearing grossly normal (rubbing fingers) b/l. Symmetric palate elevation in midline. Gag reflex deferred. Head turning & shoulder shrug intact b/l. Tongue midline, normal movements, no atrophy.    Motor: Normal muscle bulk & tone. No noticeable tremor or seizure.  LUE: 4+ shoulder flex, elbow flex 4 ext 4,  4  RUE: 0 shoulder flex, 0 elbow flex and ext,  2   RLE: 0 hip flex, 0 dorsi/plantar flex ankle  LLE: 3 hip flex, 4+ knee ext, dorsi/plantarflex 2 (limited due to L sided back pain w/ radiation)   Sensation: Decreased to light touch in RUE and RLE.   Coordination: unable to test   Gait: unable to test    LABORATORY:  CBC                       13.9   9.84  )-----------( 227      ( 2022 11:37 )             44.6     Chem     141  |  103  |  19  ----------------------------<  73  3.8   |  22  |  0.54    Ca    9.3      2022 14:24    TPro  6.7  /  Alb  3.9  /  TBili  0.4  /  DBili  x   /  AST  34  /  ALT  30  /  AlkPhos  188<H>      LFTs LIVER FUNCTIONS - ( 2022 14:24 )  Alb: 3.9 g/dL / Pro: 6.7 g/dL / ALK PHOS: 188 U/L / ALT: 30 U/L / AST: 34 U/L / GGT: x           Coagulopathy PT/INR - ( 2022 12:14 )   PT: 11.5 sec;   INR: 0.99 ratio      PTT - ( 2022 12:14 )  PTT:27.4 sec  Lipid Panel   A1c   Cardiac enzymes     U/A Urinalysis Basic - ( 2022 13:35 )    Color: Yellow / Appearance: Turbid / S.020 / pH: x  Gluc: x / Ketone: Negative  / Bili: Negative / Urobili: Negative   Blood: x / Protein: Trace / Nitrite: Negative   Leuk Esterase: Small / RBC: 2 /hpf / WBC 1 /HPF   Sq Epi: x / Non Sq Epi: Few / Bacteria: Many    CSF  Other    STUDIES & IMAGING: (EEG, CT, MR, U/S, TTE/AARON):    ACC: [id number] EXAM:  CT BRAIN STROKE PROTOCOL                          PROCEDURE DATE:  2022      INTERPRETATION:  .    CLINICAL INFORMATION: Code stroke.    TECHNIQUE: Multiple axial CT images of the head were obtained without   contrast. Sagittal and coronal reconstructed images were acquired from   the source data.    COMPARISON: Prior CT examination of the head dated 4/3/2016.    FINDINGS: A chronic appearing lacunar infarct is noted within the   posterior limb of the internal capsule which appears new when compared to   the prior CT exam. There is also associated wallerian degeneration of the   left corticospinal tract.    There is no acute intracranial hemorrhage, mass effect, shift of the   midline structures, or herniation.    Mild ventriculomegaly appears grossly unchanged.    There is diffuse cerebral volume loss with prominence of the sulci,   fissures, and cisternal spaces which is normal for the patient's age.   There is mild periventricular white matter hypoattenuation statistically   compatible with microvascular changes given calcific atherosclerotic   disease of the intracranial arteries.    The paranasal sinuses and mastoid air cells are clear. The calvarium is   intact. There has been interval right-sided cataract removal when   compared to the prior exam. The left orbit appears unremarkable.    IMPRESSION: No acute intracranial hemorrhage.    Chronic lacunar infarct within the posterior limb of the left external   capsule with associated wallerian degeneration of the left corticospinal   tract. Findings are new when compared with 4/3/2016.    --- End of Report ---    ROBLES GUZMAN MD; Attending Radiologist  This document has been electronically signed. 2022 11:59AM

## 2022-06-18 NOTE — ED PROVIDER NOTE - CARE PLAN
1 Principal Discharge DX:	Aphasia   Principal Discharge DX:	Dysarthria  Secondary Diagnosis:	Urinary tract infection

## 2022-06-18 NOTE — CONSULT NOTE ADULT - ASSESSMENT
Impression:      Recommendation:  [ ] Discussed case w/ stroke attending Dr Richard Libman and stroke fellow Dr Saloni Lu. 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 At that time, per family, pt was saying words in a random order and not making sense. No worsening of baseline weakness per patient and family, LUE drift is not new (2/2 dystrophy). Pt is on eliquis at home, for unknown reason. Pt denies any recent HA, n/v, changes in vision/hearing, head trauma but has some neck pain.  Cannot ambulate independently at baseline, needs help with activities. NIHSS 7 (LUE drift (2/2 dystrophy), RUE some effort, LLE no drift, RLE no effort, moderate sensory loss in RUE/RLE). PreMRS 4. The evaluation at that time was new onset resolved dysarthria and aphasia possibly 2/2 TIA. CT head then showed no acute intracranial hemorrhage. 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 Patient was sent out from the ED. Stroke neuro attending Dr Richard Libman requested patient come to ED to obtain circulation vessel imaging. Here, patient and daughter report no new worsening symptoms since before. Has been having L sided lower back pain limiting LLE movement.    Impression: Patient returns for vessel imaging     Recommendation:  [ ] Discussed case w/ stroke attending Dr Richard Libman and stroke fellow Dr Saloni Lu. As patient unable to obtain MRA in CDU, discussed options w/ ED and agreed upon CTA head/neck for expedited imaging. If unrevealing, will have patient follow up outpatient with stroke service.   Patient SL is a 80 yo F PMH of Dementia, depression, glaucoma, muscular dystrophy, CVA in 11/2021 with residual R sided hemiparesis from nursing facility who presented 6/17 as a code stroke for new slurred speech and aphasia since 1 pm day prior with symptom resolution. At that time, per family, pt was saying words in a random order and not making sense. No worsening of baseline weakness per patient and family, LUE drift is not new (2/2 dystrophy). Pt is on eliquis at home, for unknown reason. Pt denies any recent HA, n/v, changes in vision/hearing, head trauma but has some neck pain.  Cannot ambulate independently at baseline, needs help with activities. NIHSS 7 (LUE drift (2/2 dystrophy), RUE some effort, LLE no drift, RLE no effort, moderate sensory loss in RUE/RLE). PreMRS 4. The evaluation at that time was new onset resolved dysarthria and aphasia possibly 2/2 TIA. CT head then showed no acute intracranial hemorrhage. 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 Patient was sent out from the ED. Stroke neuro attending Dr Richard Libman requested patient come to ED to obtain circulation vessel imaging. Here, patient and aide report no new worsening symptoms since before. Has been having L sided lower back pain limiting LLE movement. Confirmed with daughter patient taking eliquis at facility. Unclear etiology of stroke.     (Stroke only)  NIHSS: 10  (LUE +1, RUE +3, LLE +1, RLE +4, sensory +1)  pain limited, incr weakness in RUE and LLE per NIHSS but pt/family state no new deficits  preMRS: 4    Impression: Patient returns for vessel imaging     Recommendation:  [ ] Discussed case w/ stroke attending Dr Richard Libman and stroke fellow Dr Saloni Lu. As patient unable to obtain MRA in CDU, discussed options w/ ED and agreed upon CTA head/neck for expedited imaging. If unrevealing, will have patient follow up outpatient with stroke service.

## 2022-06-18 NOTE — ED ADULT TRIAGE NOTE - CHIEF COMPLAINT QUOTE
slurred speech x3 days, seen here yesterday had CT and d/c'ed, called to come back by Dr. Libman for CTA or MRI slurred speech x3 days, seen here yesterday had CT and d/c'ed, called to come back by Dr. Libman for CTA or MRI  no current slurred speech

## 2022-06-19 LAB
-  AMIKACIN: SIGNIFICANT CHANGE UP
-  AMOXICILLIN/CLAVULANIC ACID: SIGNIFICANT CHANGE UP
-  AMPICILLIN/SULBACTAM: SIGNIFICANT CHANGE UP
-  AMPICILLIN: SIGNIFICANT CHANGE UP
-  AZTREONAM: SIGNIFICANT CHANGE UP
-  CEFAZOLIN: SIGNIFICANT CHANGE UP
-  CEFEPIME: SIGNIFICANT CHANGE UP
-  CEFOXITIN: SIGNIFICANT CHANGE UP
-  CEFTRIAXONE: SIGNIFICANT CHANGE UP
-  CIPROFLOXACIN: SIGNIFICANT CHANGE UP
-  ERTAPENEM: SIGNIFICANT CHANGE UP
-  GENTAMICIN: SIGNIFICANT CHANGE UP
-  LEVOFLOXACIN: SIGNIFICANT CHANGE UP
-  MEROPENEM: SIGNIFICANT CHANGE UP
-  NITROFURANTOIN: SIGNIFICANT CHANGE UP
-  PIPERACILLIN/TAZOBACTAM: SIGNIFICANT CHANGE UP
-  TOBRAMYCIN: SIGNIFICANT CHANGE UP
-  TRIMETHOPRIM/SULFAMETHOXAZOLE: SIGNIFICANT CHANGE UP
A1C WITH ESTIMATED AVERAGE GLUCOSE RESULT: 5.9 % — HIGH (ref 4–5.6)
ANION GAP SERPL CALC-SCNC: 10 MMOL/L — SIGNIFICANT CHANGE UP (ref 5–17)
BUN SERPL-MCNC: 15 MG/DL — SIGNIFICANT CHANGE UP (ref 7–23)
CALCIUM SERPL-MCNC: 9.1 MG/DL — SIGNIFICANT CHANGE UP (ref 8.4–10.5)
CHLORIDE SERPL-SCNC: 103 MMOL/L — SIGNIFICANT CHANGE UP (ref 96–108)
CHOLEST SERPL-MCNC: 154 MG/DL — SIGNIFICANT CHANGE UP
CO2 SERPL-SCNC: 25 MMOL/L — SIGNIFICANT CHANGE UP (ref 22–31)
CREAT SERPL-MCNC: 0.5 MG/DL — SIGNIFICANT CHANGE UP (ref 0.5–1.3)
CULTURE RESULTS: SIGNIFICANT CHANGE UP
EGFR: 95 ML/MIN/1.73M2 — SIGNIFICANT CHANGE UP
ESTIMATED AVERAGE GLUCOSE: 123 MG/DL — HIGH (ref 68–114)
GLUCOSE SERPL-MCNC: 100 MG/DL — HIGH (ref 70–99)
HCT VFR BLD CALC: 40.4 % — SIGNIFICANT CHANGE UP (ref 34.5–45)
HDLC SERPL-MCNC: 85 MG/DL — SIGNIFICANT CHANGE UP
HGB BLD-MCNC: 12.8 G/DL — SIGNIFICANT CHANGE UP (ref 11.5–15.5)
LIPID PNL WITH DIRECT LDL SERPL: 55 MG/DL — SIGNIFICANT CHANGE UP
MCHC RBC-ENTMCNC: 31 PG — SIGNIFICANT CHANGE UP (ref 27–34)
MCHC RBC-ENTMCNC: 31.7 GM/DL — LOW (ref 32–36)
MCV RBC AUTO: 97.8 FL — SIGNIFICANT CHANGE UP (ref 80–100)
METHOD TYPE: SIGNIFICANT CHANGE UP
NON HDL CHOLESTEROL: 69 MG/DL — SIGNIFICANT CHANGE UP
NRBC # BLD: 0 /100 WBCS — SIGNIFICANT CHANGE UP (ref 0–0)
ORGANISM # SPEC MICROSCOPIC CNT: SIGNIFICANT CHANGE UP
ORGANISM # SPEC MICROSCOPIC CNT: SIGNIFICANT CHANGE UP
PLATELET # BLD AUTO: 228 K/UL — SIGNIFICANT CHANGE UP (ref 150–400)
POTASSIUM SERPL-MCNC: 3.9 MMOL/L — SIGNIFICANT CHANGE UP (ref 3.5–5.3)
POTASSIUM SERPL-SCNC: 3.9 MMOL/L — SIGNIFICANT CHANGE UP (ref 3.5–5.3)
RBC # BLD: 4.13 M/UL — SIGNIFICANT CHANGE UP (ref 3.8–5.2)
RBC # FLD: 13 % — SIGNIFICANT CHANGE UP (ref 10.3–14.5)
SARS-COV-2 RNA SPEC QL NAA+PROBE: SIGNIFICANT CHANGE UP
SODIUM SERPL-SCNC: 138 MMOL/L — SIGNIFICANT CHANGE UP (ref 135–145)
SPECIMEN SOURCE: SIGNIFICANT CHANGE UP
TRIGL SERPL-MCNC: 72 MG/DL — SIGNIFICANT CHANGE UP
WBC # BLD: 9.2 K/UL — SIGNIFICANT CHANGE UP (ref 3.8–10.5)
WBC # FLD AUTO: 9.2 K/UL — SIGNIFICANT CHANGE UP (ref 3.8–10.5)

## 2022-06-19 PROCEDURE — 70545 MR ANGIOGRAPHY HEAD W/DYE: CPT | Mod: 26,59

## 2022-06-19 PROCEDURE — 93970 EXTREMITY STUDY: CPT | Mod: 26

## 2022-06-19 PROCEDURE — 71250 CT THORAX DX C-: CPT | Mod: 26

## 2022-06-19 PROCEDURE — 93306 TTE W/DOPPLER COMPLETE: CPT | Mod: 26

## 2022-06-19 PROCEDURE — 70553 MRI BRAIN STEM W/O & W/DYE: CPT | Mod: 26

## 2022-06-19 RX ORDER — ASPIRIN/CALCIUM CARB/MAGNESIUM 324 MG
81 TABLET ORAL DAILY
Refills: 0 | Status: DISCONTINUED | OUTPATIENT
Start: 2022-06-19 | End: 2022-06-20

## 2022-06-19 RX ORDER — ASPIRIN/CALCIUM CARB/MAGNESIUM 324 MG
300 TABLET ORAL DAILY
Refills: 0 | Status: DISCONTINUED | OUTPATIENT
Start: 2022-06-19 | End: 2022-06-19

## 2022-06-19 RX ORDER — CEFTRIAXONE 500 MG/1
INJECTION, POWDER, FOR SOLUTION INTRAMUSCULAR; INTRAVENOUS
Refills: 0 | Status: DISCONTINUED | OUTPATIENT
Start: 2022-06-19 | End: 2022-06-20

## 2022-06-19 RX ORDER — CEFTRIAXONE 500 MG/1
1000 INJECTION, POWDER, FOR SOLUTION INTRAMUSCULAR; INTRAVENOUS ONCE
Refills: 0 | Status: COMPLETED | OUTPATIENT
Start: 2022-06-19 | End: 2022-06-19

## 2022-06-19 RX ORDER — ESCITALOPRAM OXALATE 10 MG/1
1 TABLET, FILM COATED ORAL
Qty: 0 | Refills: 0 | DISCHARGE

## 2022-06-19 RX ORDER — FAMOTIDINE 10 MG/ML
20 INJECTION INTRAVENOUS AT BEDTIME
Refills: 0 | Status: DISCONTINUED | OUTPATIENT
Start: 2022-06-19 | End: 2022-06-20

## 2022-06-19 RX ORDER — CEFTRIAXONE 500 MG/1
1000 INJECTION, POWDER, FOR SOLUTION INTRAMUSCULAR; INTRAVENOUS EVERY 24 HOURS
Refills: 0 | Status: DISCONTINUED | OUTPATIENT
Start: 2022-06-20 | End: 2022-06-20

## 2022-06-19 RX ORDER — CETIRIZINE HYDROCHLORIDE 10 MG/1
1 TABLET ORAL
Qty: 0 | Refills: 0 | DISCHARGE

## 2022-06-19 RX ORDER — ENOXAPARIN SODIUM 100 MG/ML
40 INJECTION SUBCUTANEOUS EVERY 24 HOURS
Refills: 0 | Status: DISCONTINUED | OUTPATIENT
Start: 2022-06-19 | End: 2022-06-20

## 2022-06-19 RX ORDER — ESOMEPRAZOLE MAGNESIUM 40 MG/1
1 CAPSULE, DELAYED RELEASE ORAL
Qty: 0 | Refills: 0 | DISCHARGE

## 2022-06-19 RX ORDER — CYCLOBENZAPRINE HYDROCHLORIDE 10 MG/1
5 TABLET, FILM COATED ORAL
Refills: 0 | Status: DISCONTINUED | OUTPATIENT
Start: 2022-06-19 | End: 2022-06-20

## 2022-06-19 RX ORDER — ACETAMINOPHEN 500 MG
650 TABLET ORAL EVERY 6 HOURS
Refills: 0 | Status: DISCONTINUED | OUTPATIENT
Start: 2022-06-19 | End: 2022-06-20

## 2022-06-19 RX ORDER — DONEPEZIL HYDROCHLORIDE 10 MG/1
1 TABLET, FILM COATED ORAL
Qty: 0 | Refills: 0 | DISCHARGE

## 2022-06-19 RX ORDER — CALCIUM CARBONATE 500(1250)
1 TABLET ORAL
Qty: 0 | Refills: 0 | DISCHARGE

## 2022-06-19 RX ORDER — LANOLIN ALCOHOL/MO/W.PET/CERES
5 CREAM (GRAM) TOPICAL AT BEDTIME
Refills: 0 | Status: DISCONTINUED | OUTPATIENT
Start: 2022-06-19 | End: 2022-06-20

## 2022-06-19 RX ORDER — MAGNESIUM OXIDE 400 MG ORAL TABLET 241.3 MG
2 TABLET ORAL
Qty: 0 | Refills: 0 | DISCHARGE

## 2022-06-19 RX ORDER — DULOXETINE HYDROCHLORIDE 30 MG/1
60 CAPSULE, DELAYED RELEASE ORAL
Refills: 0 | Status: DISCONTINUED | OUTPATIENT
Start: 2022-06-19 | End: 2022-06-20

## 2022-06-19 RX ORDER — DULOXETINE HYDROCHLORIDE 30 MG/1
1 CAPSULE, DELAYED RELEASE ORAL
Qty: 0 | Refills: 0 | DISCHARGE

## 2022-06-19 RX ORDER — SENNA PLUS 8.6 MG/1
2 TABLET ORAL AT BEDTIME
Refills: 0 | Status: DISCONTINUED | OUTPATIENT
Start: 2022-06-19 | End: 2022-06-20

## 2022-06-19 RX ADMIN — CYCLOBENZAPRINE HYDROCHLORIDE 5 MILLIGRAM(S): 10 TABLET, FILM COATED ORAL at 17:05

## 2022-06-19 RX ADMIN — DULOXETINE HYDROCHLORIDE 60 MILLIGRAM(S): 30 CAPSULE, DELAYED RELEASE ORAL at 17:04

## 2022-06-19 RX ADMIN — Medication 650 MILLIGRAM(S): at 02:14

## 2022-06-19 RX ADMIN — Medication 1 DROP(S): at 17:05

## 2022-06-19 RX ADMIN — Medication 1 DROP(S): at 05:50

## 2022-06-19 RX ADMIN — Medication 650 MILLIGRAM(S): at 03:00

## 2022-06-19 RX ADMIN — CEFTRIAXONE 100 MILLIGRAM(S): 500 INJECTION, POWDER, FOR SOLUTION INTRAMUSCULAR; INTRAVENOUS at 15:53

## 2022-06-19 RX ADMIN — ENOXAPARIN SODIUM 40 MILLIGRAM(S): 100 INJECTION SUBCUTANEOUS at 05:50

## 2022-06-19 RX ADMIN — CYCLOBENZAPRINE HYDROCHLORIDE 5 MILLIGRAM(S): 10 TABLET, FILM COATED ORAL at 05:50

## 2022-06-19 RX ADMIN — ATORVASTATIN CALCIUM 80 MILLIGRAM(S): 80 TABLET, FILM COATED ORAL at 22:40

## 2022-06-19 RX ADMIN — FAMOTIDINE 20 MILLIGRAM(S): 10 INJECTION INTRAVENOUS at 22:40

## 2022-06-19 RX ADMIN — SENNA PLUS 2 TABLET(S): 8.6 TABLET ORAL at 22:40

## 2022-06-19 RX ADMIN — Medication 81 MILLIGRAM(S): at 15:49

## 2022-06-19 RX ADMIN — Medication 5 MILLIGRAM(S): at 22:40

## 2022-06-19 RX ADMIN — DULOXETINE HYDROCHLORIDE 60 MILLIGRAM(S): 30 CAPSULE, DELAYED RELEASE ORAL at 05:50

## 2022-06-19 NOTE — PHYSICAL THERAPY INITIAL EVALUATION ADULT - PERTINENT HX OF CURRENT PROBLEM, REHAB EVAL
80 yo F PMH of Dementia, depression, glaucoma, muscular dystrophy, CVA in 11/2021 with residual R sided hemiparesis from nursing facility who presented 6/17 as a code stroke for new slurred speech and aphasia since 1 pm day prior with symptom resolution. At that time, per family, pt was saying words in a random order and not making sense. No worsening of baseline weakness per patient and family, LUE drift is not new (2/2 dystrophy).

## 2022-06-19 NOTE — OCCUPATIONAL THERAPY INITIAL EVALUATION ADULT - PERTINENT HX OF CURRENT PROBLEM, REHAB EVAL
82 yo F PMH of Dementia, depression, glaucoma, muscular dystrophy, CVA in 11/2021 with residual R sided hemiparesis from nursing facility who presented 6/17 as a code stroke for new slurred speech and aphasia since 1 pm day prior with symptom resolution. At that time, per family, pt was saying words in a random order and not making sense. No worsening of baseline weakness per patient and family, LUE drift is not new (2/2 dystrophy).

## 2022-06-19 NOTE — SPEECH LANGUAGE PATHOLOGY EVALUATION - SLP PERTINENT HISTORY OF CURRENT PROBLEM
ASHLEY VIRK is an 82 yo F PMH of Dementia, depression, glaucoma, muscular dystrophy, CVA in 11/2021 with residual R sided hemiparesis from nursing facility who presented 6/17 as a code stroke for new slurred speech and aphasia since 1 pm day prior with symptom resolution. At that time, per family, pt was saying words in a random order and not making sense. No worsening of baseline weakness per patient and family, LUE drift is not new (2/2 dystrophy). The evaluation at that time was new onset resolved dysarthria and aphasia possibly 2/2 TIA. CT head then showed no acute intracranial hemorrhage. Chronic lacunar infarct within the posterior limb of the left external capsule with associated Wallerian degeneration of the left corticospinal tract. Patient was sent out from the ED.

## 2022-06-19 NOTE — PATIENT PROFILE ADULT - FALL HARM RISK - HARM RISK INTERVENTIONS

## 2022-06-19 NOTE — SWALLOW BEDSIDE ASSESSMENT ADULT - ASR SWALLOW LINGUAL MOBILITY
mild/impaired protrusion/impaired anterior elevation/impaired left lateral movement/impaired right lateral movement

## 2022-06-19 NOTE — PHYSICAL THERAPY INITIAL EVALUATION ADULT - MANUAL MUSCLE TESTING RESULTS, REHAB EVAL
3+/5 LUE and LLE at all major pivots; 1/5 R hip, 0/5 at knee and ankle; R Shoulder 1/5, R elbow 1/5, R hand 1/5

## 2022-06-19 NOTE — OCCUPATIONAL THERAPY INITIAL EVALUATION ADULT - MANUAL MUSCLE TESTING RESULTS, REHAB EVAL
LUE: shoulder 3-/5, elbow/wrist/hand 3+/5, RUE: shoulder 2-/5, elbow/wrist/hand 2+/5, RLE: 0/5 ankle, LLE: 3-/5

## 2022-06-19 NOTE — SWALLOW BEDSIDE ASSESSMENT ADULT - NS SPL SWALLOW CLINIC TRIAL FT
Pt complained of globus sensation, pointed to her throat, and denied esophageal component. HHA indicated pt has this complaint and was given a PPI. Pt repeatedly indicated location being pharyngeal and no esophageal throughout testing. Appreciated intermittent double swallows w/ trials. No vocal quality change. No change in breathing.

## 2022-06-19 NOTE — SPEECH LANGUAGE PATHOLOGY EVALUATION - COMMENTS
functional GOAL- Pt to be able to communicate wants/needs effectively during course to be an active participant in her care. Auditory comprehension skills p/w deficits in presence of baseline dementia and suspected language component/barrier.   -Inconsistent ability to follow 1-2 step commands such as 'point to the ceiling'.   -Reduced ability to ID objects and pictures w/ 70% accuracy, HHA indicating pt was previously 'quicker' with her responses   -More complex yes/no questions such as 'does paper burn in fire' pt with discrepancies. HHA reporting pt would be able to answer those questions prior to this admission. Verbal expression skills p/w deficits in presence of baseline dementia and suspected language component/barrier.   -HHA reporting pt much quicker w/ response time PTA  -PAUCITY w/ responses Inconsistent ability to follow directions to complete task DNT      unable to hold pen Cognitive linguistic skills p/w deficits in presence of baseline dementia and suspected language component/barrier.   -Inconsistent ability to answer organization, reasoning and problem solving questions. DNT · Comments	Continued history: Stroke neuro attending Dr Richard Libman requested patient come to ED to obtain circulation vessel imaging. Here, patient and aide report no new worsening symptoms since before. Has been having L sided lower back pain limiting LLE movement.     -6/17 X-ray Chest : Rounded right apical opacity may represent pneumonia in the appropriate clinical setting. CT is suggested for further evaluation.  -6/18-IMPRESSION:  -CT HEAD: No acute intracranial bleeding. Chronic infarction of the posterior limb of left internal capsule with Wallerian degeneration into the cerebral peduncle of the midbrain.  -CTA BRAIN: Patent central intracranial circulation. No flow-limiting stenosis or occlusion.Focal filling defects in the superior sagittal sinus and right transverse sinus favored to reflect atypical appearance of arachnoid granulation rather than nonocclusive thrombi. No associated parenchymal abnormality is noted. MRI brain with contrast may be helpful for further clarification.  -CTA NECK: Patent cervical vasculature. No flow limiting stenosis or occlusion.    **SWALLOW SCREEN completed 6/18 1930; PASSED; in presence of KNOWN DYSPHAGIA AND MODIFIED DIET PTA**    Patient known to this service from 2013 and 2020 admission, see swallow evaluation for details.

## 2022-06-19 NOTE — OCCUPATIONAL THERAPY INITIAL EVALUATION ADULT - TRANSFER TRAINING, PT EVAL
Pt will perform all functional transfers using least restrictive AD with mod assist x1 within 4 weeks.

## 2022-06-19 NOTE — H&P ADULT - ATTENDING COMMENTS
VASCULAR NEUROLOGY ATTENDING  The patient is seen and examined the history and imaging are reviewed. I agree with the resident note unless otherwise noted. Patient appears at her neurologic baseline. Overall low suspicion for CVT. No objection to MRV with geetha to clarify. Plan for early hospital discharge. Agree with ASA for now.

## 2022-06-19 NOTE — SWALLOW BEDSIDE ASSESSMENT ADULT - SWALLOW EVAL: DIAGNOSIS
Pt presented 6/17 as a code stroke for new slurred speech and aphasia; Patient returns for vessel imaging and found with atypical focal filling defects in superior sagittal sinus and R transverse sinus likely arachnoid granulations. Pt p/w an oropharyngeal dysphagia is presence of known deficits.

## 2022-06-19 NOTE — PHYSICAL THERAPY INITIAL EVALUATION ADULT - ADDITIONAL COMMENTS
Daughter present at bedside - Daughter advised PTA pt lived at [facility] as a long term resident and discharge plan is to return to [facility] at discharge. Daughter also advised pt has 2( 12 hr HHA) private pay in place at discharge. Pt had begun receiving physical therapy over last 2 weeks

## 2022-06-19 NOTE — OCCUPATIONAL THERAPY INITIAL EVALUATION ADULT - ADL RETRAINING, OT EVAL
Pt will be perform self feeding with setup and supervision within 4 weeks. Pt will perform UB Dressing with min assist within 4 weeks.

## 2022-06-19 NOTE — SPEECH LANGUAGE PATHOLOGY EVALUATION - SLP DIAGNOSIS
Pt presented 6/17 as a code stroke for new slurred speech and aphasia; Patient returns for vessel imaging and found with atypical focal filling defects in superior sagittal sinus and R transverse sinus likely arachnoid granulations. Appreciated deficits w/ regards to speech/language skills in presence of baseline dementia and suspected language barrier.

## 2022-06-19 NOTE — PHYSICAL THERAPY INITIAL EVALUATION ADULT - PRECAUTIONS/LIMITATIONS, REHAB EVAL
Cannot ambulate independently at baseline, needs help with activities. NIHSS 7 (LUE drift (2/2 dystrophy), RUE some effort, LLE no drift, RLE no effort, moderate sensory loss in RUE/RLE). The evaluation at that time was new onset resolved dysarthria and aphasia possibly 2/2 TIA. CT head then showed no acute intracranial hemorrhage. 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 Patient was sent out from the ED. Stroke neuro attending Dr Richard Libman requested patient come to ED to obtain circulation vessel imaging. Here, patient and aide report no new worsening symptoms since before. Has been having L sided lower back pain limiting LLE movement. Cannot ambulate independently at baseline, needs help with activities. NIHSS 7 (LUE drift (2/2 dystrophy), RUE some effort, LLE no drift, RLE no effort, moderate sensory loss in RUE/RLE). The evaluation at that time was new onset resolved dysarthria and aphasia possibly 2/2 TIA. CT head then showed no acute intracranial hemorrhage. 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 Patient was sent out from the ED. Stroke neuro attending Dr Richard Libman requested patient come to ED to obtain circulation vessel imaging. Here, patient and aide report no new worsening symptoms since before. Has been having L sided lower back pain limiting LLE movement./fall precautions

## 2022-06-19 NOTE — PHYSICAL THERAPY INITIAL EVALUATION ADULT - ACTIVE RANGE OF MOTION EXAMINATION, REHAB EVAL
RLE AROM absent/PROM WNL/Left UE Active ROM was WFL (within functional limits)/Left LE Active ROM was WFL (within functional limits)

## 2022-06-19 NOTE — OCCUPATIONAL THERAPY INITIAL EVALUATION ADULT - RANGE OF MOTION EXAMINATION, LOWER EXTREMITY
Left LE Active Assistive ROM was WFL (within functional limits)/Right LE Active Assistive ROM was WFL  (within functional limits)

## 2022-06-19 NOTE — OCCUPATIONAL THERAPY INITIAL EVALUATION ADULT - ADDITIONAL COMMENTS
As per chart review, pt lived at [facility] as a long term resident and discharge plan is to return to [facility]. Daughter also advised pt has 2( 12 hr HHA) private pay in place at discharge. Pt had begun receiving physical therapy over last 2 weeks.

## 2022-06-19 NOTE — SWALLOW BEDSIDE ASSESSMENT ADULT - SLP GENERAL OBSERVATIONS
Encountered in bed, awake/alert. RA+ HHA+ Communicating well. Pt requesting to have this evaluation in English.

## 2022-06-19 NOTE — SWALLOW BEDSIDE ASSESSMENT ADULT - SWALLOW EVAL: RECOMMENDED DIET
Mildly thick liquids and soft/bite sized solids - strongly encourage the pursuit of a MBS during course or at next level of care Mildly thick liquids and soft/bite sized solids - SINGLE SIPS ONLY-strongly encourage the pursuit of a MBS during course or at next level of care

## 2022-06-19 NOTE — SWALLOW BEDSIDE ASSESSMENT ADULT - ADDITIONAL RECOMMENDATIONS
GOAL- Pt to adhere to safe swallowing practices   GOAL- Pt to tolerate recommended textures during course w/ no s/sx of aspiration

## 2022-06-19 NOTE — SWALLOW BEDSIDE ASSESSMENT ADULT - COMMENTS
Continued history: Stroke neuro attending Dr Richard Libman requested patient come to ED to obtain circulation vessel imaging. Here, patient and aide report no new worsening symptoms since before. Has been having L sided lower back pain limiting LLE movement.     -6/17 X-ray Chest : Rounded right apical opacity may represent pneumonia in the appropriate clinical setting. CT is suggested for further evaluation.  -6/18-IMPRESSION:  -CT HEAD: No acute intracranial bleeding. Chronic infarction of the posterior limb of left internal capsule with Wallerian degeneration into the cerebral peduncle of the midbrain.  -CTA BRAIN: Patent central intracranial circulation. No flow-limiting stenosis or occlusion.Focal filling defects in the superior sagittal sinus and right transverse sinus favored to reflect atypical appearance of arachnoid granulation rather than nonocclusive thrombi. No associated parenchymal abnormality is noted. MRI brain with contrast may be helpful for further clarification.  -CTA NECK: Patent cervical vasculature. No flow limiting stenosis or occlusion.    **SWALLOW SCREEN completed 6/18 1930; PASSED; in presence of KNOWN DYSPHAGIA AND MODIFIED DIET PTA**    Swallow history: Pt is known to this service from PREVIOUS admissions:   -2013 CSE completed with MBS: Regular texture diet  -2020 CSE completed with MBS: Dysphagia III with nectar thick liquids VIA SMALL SINGLE SIPS -->SEE ADDENDUM FOR DETAILS  Appreciated MBS in PACS from 2021, suspected from [facility], as no report in SCM. Note; "There was aspiration with large volume thin liquids. However, there is no aspiration with small volume thin liquids or nectar thick liquids."   AYDEE Dejesus reported that pt eats/drinks mildly thick liquids and chopped solids at baseline, she noted pt with 'this cough" and was concerned about it yesterday. Pt self presents meals with help as needed given physical limitations. CALEB provided pt with Jello and applesauce yesterday and 'did ok'.

## 2022-06-19 NOTE — SWALLOW BEDSIDE ASSESSMENT ADULT - SWALLOW EVAL: SECRETION MANAGEMENT
inconsistent, dry, appreciated x1 prior to trials and  3-4 minutes following trials -- HHA indicated pt has had this cough at baseline/baseline cough

## 2022-06-19 NOTE — SWALLOW BEDSIDE ASSESSMENT ADULT - ORAL PHASE
prolonged however eventually effective mastication and manipulation of bolus/Delayed oral transit time

## 2022-06-19 NOTE — H&P ADULT - ASSESSMENT
80 yo F PMH of Dementia, depression, glaucoma, muscular dystrophy, CVA in 11/2021 with residual R sided hemiparesis from nursing facility who presented 6/17 as a code stroke for new slurred speech and aphasia since 1 pm day prior with symptom resolution. At that time, per family, pt was saying words in a random order and not making sense. No worsening of baseline weakness per patient and family, LUE drift is not new (2/2 dystrophy). Pt is on eliquis at home, for unknown reason. Pt denies any recent HA, n/v, changes in vision/hearing, head trauma but has some neck pain.  Cannot ambulate independently at baseline, needs help with activities. NIHSS 7 (LUE drift (2/2 dystrophy), RUE some effort, LLE no drift, RLE no effort, moderate sensory loss in RUE/RLE). PreMRS 4. The evaluation at that time was new onset resolved dysarthria and aphasia possibly 2/2 TIA. CT head then showed no acute intracranial hemorrhage. 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 Patient was sent out from the ED. Stroke neuro attending Dr Richard Libman requested patient come to ED to obtain circulation vessel imaging. Here, patient and aide report no new worsening symptoms since before. Has been having L sided lower back pain limiting LLE movement. Confirmed with daughter patient taking eliquis at facility. Called [facility] for medication reconciliation and appears eliquis nor even aspirin is listed. Unclear etiology of stroke or if patient is taking eliquis. CTA showed patent intracranial arteries however focal filling defects in the superior sagittal sinus and right transverse sinus favored to reflect atypical appearance of arachnoid granulation rather than nonocclusive thrombi. No associated parenchymal abnormality   is noted. Admitted for MRI.    (Stroke only)  NIHSS: 10  (LUE +1, RUE +3, LLE +1, RLE +4, sensory +1)  pain limited, incr weakness in RUE and LLE per NIHSS but pt/family state no new deficits  preMRS: 4    Impression: Patient returns for vessel imaging and found with atypical focal filling defects in superior sagittal sinus and R transverse sinus likely arachnoid granulations. Also of note, unclear if patient is on eliquis for stroke prevention.     Recommendation:    [] Aspirin 81mg daily (or 300mg per rectum) until further workup and clarification if patient actually takes eliquis  [] Atorvastatin 40-80 mg daily titrated per LDL < 70  [] HgbA1C, fasting lipid panel, CBC, CMP, coag panel, troponin  [] MRI brain w/o con, MRV w/ con  [] TTE w/ bubble study if found with acute stroke  [] telemetry to check for arrhythmia, EKG     - Tight glucose control (long-term goal HgbA1c < 6%)  - Stroke education and counseling  - Neuro-checks and VS q4h   - Dysphagia screen. If fails, speech/swallow eval  - aspiration, fall precautions  - STAT CT head non-contrast for change in neuro exam.   - PT/ OT / DVT ppx     Discussed with stroke fellow Dr Saloni Lu and under supervision of attending Dr Richard Libman regarding plan for admission. To be staffed by stroke attending in AM.

## 2022-06-19 NOTE — H&P ADULT - NSHPPHYSICALEXAM_GEN_ALL_CORE
Vital Signs Last 24 Hrs  T(C): 36.7 (18 Jun 2022 21:34), Max: 37.1 (18 Jun 2022 17:14)  T(F): 98.1 (18 Jun 2022 21:34), Max: 98.7 (18 Jun 2022 17:14)  HR: 74 (18 Jun 2022 21:29) (74 - 86)  BP: 116/64 (18 Jun 2022 21:29) (116/60 - 116/64)  BP(mean): --  RR: 20 (18 Jun 2022 21:29) (18 - 20)  SpO2: 100% (18 Jun 2022 21:29) (95% - 100%)      General: comfortable appearing, awake, alert  Head: normocephalic  Skin: chronic changes  Resp: breathing comfortably    PHYSICAL EXAM:  Neurological Exam:  MS: Awake, alert, oriented to self, location, month but not year. Normal affect. Follows all commands.  Language: Speech is clear, fluent with good repetition & comprehension (able to name objects)    CNs: PERRLA (R = 3mm, L = 3mm). VFF. EOMI no nystagmus, no diplopia. V1-3 mildly focal decreased on R face due to mild swelling. No facial asymmetry b/l, Hearing grossly normal (rubbing fingers) b/l. Symmetric palate elevation in midline. Gag reflex deferred. Head turning & shoulder shrug intact b/l. Tongue midline, normal movements, no atrophy.    Motor: Normal muscle bulk & tone. No noticeable tremor or seizure.  LUE: 4+ shoulder flex, elbow flex 4 ext 4,  4  RUE: 0 shoulder flex, 0 elbow flex and ext,  2   RLE: 0 hip flex, 0 dorsi/plantar flex ankle  LLE: 3 hip flex, 4+ knee ext, dorsi/plantarflex 2 (limited due to L sided back pain w/ radiation)   Sensation: Decreased to light touch in RUE and RLE.   Coordination: unable to test   Gait: unable to test

## 2022-06-19 NOTE — SWALLOW BEDSIDE ASSESSMENT ADULT - ORAL PREPARATORY PHASE
able to bite through solid, small piece, inconsistent ability to take small sips leading to anterior leakage trace via cup single sips, improved w/ small sips

## 2022-06-19 NOTE — H&P ADULT - HISTORY OF PRESENT ILLNESS
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 At that time, per family, pt was saying words in a random order and not making sense. No worsening of baseline weakness per patient and family, LUE drift is not new (2/2 dystrophy). Pt is on eliquis at home, for unknown reason. Pt denies any recent HA, n/v, changes in vision/hearing, head trauma but has some neck pain.  Cannot ambulate independently at baseline, needs help with activities. NIHSS 7 (LUE drift (2/2 dystrophy), RUE some effort, LLE no drift, RLE no effort, moderate sensory loss in RUE/RLE). PreMRS 4. The evaluation at that time was new onset resolved dysarthria and aphasia possibly 2/2 TIA. CT head then showed no acute intracranial hemorrhage. 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 Patient was sent out from the ED. Stroke neuro attending Dr Richard Libman requested patient come to ED to obtain circulation vessel imaging. Here, patient and aide report no new worsening symptoms since before. Has been having L sided lower back pain limiting LLE movement. Confirmed with daughter patient taking eliquis at facility. Unclear etiology of stroke. *Update, called [facility] for med rec and appears patient may not be on eliquis or aspirin.     (Stroke only)  NIHSS: 10  (LUE +1, RUE +3, LLE +1, RLE +4, sensory +1)  pain limited, incr weakness in RUE and LLE per NIHSS but pt/family state no new deficits  preMRS: 4

## 2022-06-19 NOTE — PATIENT PROFILE ADULT - FUNCTIONAL ASSESSMENT - DAILY ACTIVITY 3.
INR today was a 3.4  Per verbal from Nicole Whitlock,   Patient to hold x 1 night then resume usual coumadin dose  Check in one week. Called person and advised of instructions. Patient verbalized understanding.
1 = Total assistance

## 2022-06-20 ENCOUNTER — TRANSCRIPTION ENCOUNTER (OUTPATIENT)
Age: 82
End: 2022-06-20

## 2022-06-20 VITALS
HEART RATE: 89 BPM | RESPIRATION RATE: 18 BRPM | DIASTOLIC BLOOD PRESSURE: 73 MMHG | TEMPERATURE: 97 F | OXYGEN SATURATION: 97 % | SYSTOLIC BLOOD PRESSURE: 123 MMHG

## 2022-06-20 PROCEDURE — 70450 CT HEAD/BRAIN W/O DYE: CPT | Mod: MA

## 2022-06-20 PROCEDURE — 82803 BLOOD GASES ANY COMBINATION: CPT

## 2022-06-20 PROCEDURE — 87186 SC STD MICRODIL/AGAR DIL: CPT

## 2022-06-20 PROCEDURE — 70498 CT ANGIOGRAPHY NECK: CPT | Mod: MA

## 2022-06-20 PROCEDURE — 93970 EXTREMITY STUDY: CPT

## 2022-06-20 PROCEDURE — 85025 COMPLETE CBC W/AUTO DIFF WBC: CPT

## 2022-06-20 PROCEDURE — 70545 MR ANGIOGRAPHY HEAD W/DYE: CPT

## 2022-06-20 PROCEDURE — 87077 CULTURE AEROBIC IDENTIFY: CPT

## 2022-06-20 PROCEDURE — 99222 1ST HOSP IP/OBS MODERATE 55: CPT | Mod: GC

## 2022-06-20 PROCEDURE — 80061 LIPID PANEL: CPT

## 2022-06-20 PROCEDURE — 92610 EVALUATE SWALLOWING FUNCTION: CPT

## 2022-06-20 PROCEDURE — 87086 URINE CULTURE/COLONY COUNT: CPT

## 2022-06-20 PROCEDURE — 97166 OT EVAL MOD COMPLEX 45 MIN: CPT

## 2022-06-20 PROCEDURE — 93306 TTE W/DOPPLER COMPLETE: CPT

## 2022-06-20 PROCEDURE — 85610 PROTHROMBIN TIME: CPT

## 2022-06-20 PROCEDURE — 82947 ASSAY GLUCOSE BLOOD QUANT: CPT

## 2022-06-20 PROCEDURE — 82435 ASSAY OF BLOOD CHLORIDE: CPT

## 2022-06-20 PROCEDURE — 81001 URINALYSIS AUTO W/SCOPE: CPT

## 2022-06-20 PROCEDURE — 80048 BASIC METABOLIC PNL TOTAL CA: CPT

## 2022-06-20 PROCEDURE — 70496 CT ANGIOGRAPHY HEAD: CPT | Mod: MA

## 2022-06-20 PROCEDURE — 71045 X-RAY EXAM CHEST 1 VIEW: CPT

## 2022-06-20 PROCEDURE — 92523 SPEECH SOUND LANG COMPREHEN: CPT

## 2022-06-20 PROCEDURE — 93005 ELECTROCARDIOGRAM TRACING: CPT

## 2022-06-20 PROCEDURE — 87635 SARS-COV-2 COVID-19 AMP PRB: CPT

## 2022-06-20 PROCEDURE — 70553 MRI BRAIN STEM W/O & W/DYE: CPT

## 2022-06-20 PROCEDURE — 85014 HEMATOCRIT: CPT

## 2022-06-20 PROCEDURE — 99285 EMERGENCY DEPT VISIT HI MDM: CPT

## 2022-06-20 PROCEDURE — 83036 HEMOGLOBIN GLYCOSYLATED A1C: CPT

## 2022-06-20 PROCEDURE — 71250 CT THORAX DX C-: CPT

## 2022-06-20 PROCEDURE — 80053 COMPREHEN METABOLIC PANEL: CPT

## 2022-06-20 PROCEDURE — 84295 ASSAY OF SERUM SODIUM: CPT

## 2022-06-20 PROCEDURE — 85018 HEMOGLOBIN: CPT

## 2022-06-20 PROCEDURE — 84132 ASSAY OF SERUM POTASSIUM: CPT

## 2022-06-20 PROCEDURE — 83605 ASSAY OF LACTIC ACID: CPT

## 2022-06-20 PROCEDURE — 82565 ASSAY OF CREATININE: CPT

## 2022-06-20 PROCEDURE — 85027 COMPLETE CBC AUTOMATED: CPT

## 2022-06-20 PROCEDURE — 82962 GLUCOSE BLOOD TEST: CPT

## 2022-06-20 PROCEDURE — 97161 PT EVAL LOW COMPLEX 20 MIN: CPT

## 2022-06-20 PROCEDURE — A9585: CPT

## 2022-06-20 PROCEDURE — 85730 THROMBOPLASTIN TIME PARTIAL: CPT

## 2022-06-20 PROCEDURE — 82330 ASSAY OF CALCIUM: CPT

## 2022-06-20 RX ORDER — SENNA PLUS 8.6 MG/1
2 TABLET ORAL
Qty: 0 | Refills: 0 | DISCHARGE

## 2022-06-20 RX ORDER — ENOXAPARIN SODIUM 100 MG/ML
40 INJECTION SUBCUTANEOUS
Qty: 0 | Refills: 0 | DISCHARGE
Start: 2022-06-20

## 2022-06-20 RX ORDER — ACETAMINOPHEN 500 MG
2 TABLET ORAL
Qty: 0 | Refills: 0 | DISCHARGE
Start: 2022-06-20

## 2022-06-20 RX ORDER — BNT162B2 0.23 MG/2.25ML
0.3 INJECTION, SUSPENSION INTRAMUSCULAR ONCE
Refills: 0 | Status: COMPLETED | OUTPATIENT
Start: 2022-06-20 | End: 2022-06-20

## 2022-06-20 RX ORDER — ASPIRIN/CALCIUM CARB/MAGNESIUM 324 MG
1 TABLET ORAL
Qty: 0 | Refills: 0 | DISCHARGE
Start: 2022-06-20

## 2022-06-20 RX ORDER — FAMOTIDINE 10 MG/ML
1 INJECTION INTRAVENOUS
Qty: 0 | Refills: 0 | DISCHARGE
Start: 2022-06-20

## 2022-06-20 RX ORDER — DULOXETINE HYDROCHLORIDE 30 MG/1
1 CAPSULE, DELAYED RELEASE ORAL
Qty: 0 | Refills: 0 | DISCHARGE

## 2022-06-20 RX ORDER — FLUTICASONE PROPIONATE 220 MCG
1 AEROSOL WITH ADAPTER (GRAM) INHALATION
Qty: 0 | Refills: 0 | DISCHARGE

## 2022-06-20 RX ORDER — SENNA PLUS 8.6 MG/1
2 TABLET ORAL
Qty: 0 | Refills: 0 | DISCHARGE
Start: 2022-06-20

## 2022-06-20 RX ORDER — LANOLIN ALCOHOL/MO/W.PET/CERES
2 CREAM (GRAM) TOPICAL
Qty: 0 | Refills: 0 | DISCHARGE

## 2022-06-20 RX ORDER — CYCLOBENZAPRINE HYDROCHLORIDE 10 MG/1
1 TABLET, FILM COATED ORAL
Qty: 0 | Refills: 0 | DISCHARGE
Start: 2022-06-20

## 2022-06-20 RX ORDER — DULOXETINE HYDROCHLORIDE 30 MG/1
1 CAPSULE, DELAYED RELEASE ORAL
Qty: 0 | Refills: 0 | DISCHARGE
Start: 2022-06-20

## 2022-06-20 RX ORDER — FAMOTIDINE 10 MG/ML
1 INJECTION INTRAVENOUS
Qty: 0 | Refills: 0 | DISCHARGE

## 2022-06-20 RX ORDER — CYCLOBENZAPRINE HYDROCHLORIDE 10 MG/1
1 TABLET, FILM COATED ORAL
Qty: 0 | Refills: 0 | DISCHARGE

## 2022-06-20 RX ORDER — LANOLIN ALCOHOL/MO/W.PET/CERES
1 CREAM (GRAM) TOPICAL
Qty: 0 | Refills: 0 | DISCHARGE
Start: 2022-06-20

## 2022-06-20 RX ORDER — ATORVASTATIN CALCIUM 80 MG/1
1 TABLET, FILM COATED ORAL
Qty: 0 | Refills: 0 | DISCHARGE
Start: 2022-06-20

## 2022-06-20 RX ORDER — BROMPHENIRAMINE MALEATE, PSEUDOEPHEDRINE HYDROCHLORIDE, AND DEXTROMETHORPHAN HYDROBROMIDE 2; 10; 30 MG/5ML; MG/5ML; MG/5ML
10 SOLUTION ORAL
Qty: 0 | Refills: 0 | DISCHARGE

## 2022-06-20 RX ADMIN — CYCLOBENZAPRINE HYDROCHLORIDE 5 MILLIGRAM(S): 10 TABLET, FILM COATED ORAL at 17:40

## 2022-06-20 RX ADMIN — Medication 81 MILLIGRAM(S): at 12:12

## 2022-06-20 RX ADMIN — CYCLOBENZAPRINE HYDROCHLORIDE 5 MILLIGRAM(S): 10 TABLET, FILM COATED ORAL at 05:54

## 2022-06-20 RX ADMIN — ENOXAPARIN SODIUM 40 MILLIGRAM(S): 100 INJECTION SUBCUTANEOUS at 05:54

## 2022-06-20 RX ADMIN — BNT162B2 0.3 MILLILITER(S): 0.23 INJECTION, SUSPENSION INTRAMUSCULAR at 15:06

## 2022-06-20 RX ADMIN — DULOXETINE HYDROCHLORIDE 60 MILLIGRAM(S): 30 CAPSULE, DELAYED RELEASE ORAL at 17:40

## 2022-06-20 RX ADMIN — Medication 1 DROP(S): at 06:11

## 2022-06-20 RX ADMIN — DULOXETINE HYDROCHLORIDE 60 MILLIGRAM(S): 30 CAPSULE, DELAYED RELEASE ORAL at 05:54

## 2022-06-20 NOTE — CONSULT NOTE ADULT - ATTENDING COMMENTS
Seen and examined with resident. Agree with note.   81yFemale with debility.  Patient will need subacute rehabilitation when stable.

## 2022-06-20 NOTE — DISCHARGE NOTE PROVIDER - NSDCMRMEDTOKEN_GEN_ALL_CORE_FT
acetaminophen 325 mg oral tablet: 2 tab(s) orally every 6 hours, As needed, Temp greater or equal to 38C (100.4F), Mild Pain (1 - 3), Moderate Pain (4 - 6), Severe Pain (7 - 10)  aspirin 81 mg oral tablet, chewable: 1 tab(s) orally once a day  atorvastatin 80 mg oral tablet: 1 tab(s) orally once a day (at bedtime)  cyclobenzaprine 5 mg oral tablet: 1 tab(s) orally 2 times a day  DULoxetine 60 mg oral delayed release capsule: 1 cap(s) orally 2 times a day  enoxaparin: 40 milligram(s) subcutaneous once a day for DVT ppx  famotidine 20 mg oral tablet: 1 tab(s) orally once a day (at bedtime)  fluticasone 50 mcg/inh inhalation powder: 1 puff(s) inhaled 2 times a day  melatonin 5 mg oral tablet: 1 tab(s) orally once a day (at bedtime)  ocular lubricant ophthalmic solution: 1 drop(s) to each affected eye 2 times a day  Robitussin Allergy and Cough oral syrup: 10 milliliter(s) orally once a day, As Needed  senna oral tablet: 2 tab(s) orally once a day (at bedtime)   acetaminophen 325 mg oral tablet: 2 tab(s) orally every 6 hours, As needed, Temp greater or equal to 38C (100.4F), Mild Pain (1 - 3), Moderate Pain (4 - 6), Severe Pain (7 - 10)  aspirin 81 mg oral tablet, chewable: 1 tab(s) orally once a day  atorvastatin 80 mg oral tablet: 1 tab(s) orally once a day (at bedtime)  cyclobenzaprine 5 mg oral tablet: 1 tab(s) orally 2 times a day  DULoxetine 60 mg oral delayed release capsule: 1 cap(s) orally 2 times a day  enoxaparin: 40 milligram(s) subcutaneous once a day for DVT ppx  famotidine 20 mg oral tablet: 1 tab(s) orally once a day (at bedtime)  melatonin 5 mg oral tablet: 1 tab(s) orally once a day (at bedtime)  ocular lubricant ophthalmic solution: 1 drop(s) to each affected eye 2 times a day  senna oral tablet: 2 tab(s) orally once a day (at bedtime)  sulfamethoxazole-trimethoprim 800 mg-160 mg oral tablet: 1 tab(s) orally once a day starting 6/21. To be continued daily until 6/25

## 2022-06-20 NOTE — DISCHARGE NOTE PROVIDER - HOSPITAL COURSE
80 yo F PMH of Dementia, depression, glaucoma, muscular dystrophy, CVA in 11/2021 with residual R hemiparesis from [facility] who presented 6/18 as a code stroke for slurred speech and aphasia since 13:00 hr on 6/16. Had presented to [facility] ED on 6/17 for similar symptom and they had resolved by the end of the visit. Underwent CT brain w/o only which showed no acute intracranial hemorrhage. Had chronic lacunar infarct within posterior limb of the L external capsule with associated wallerian degeneration of the L corticospinal tract. Initially discharged on 6/17 but returned on 6/18 for vessel imaging. When pt returned on 6/18, NIHSS: 10  (LUE +1, RUE +3, LLE +1, RLE +4, sensory +1)  pain limited, incr weakness in RUE and LLE per NIHSS but pt/family state no new deficits. preMRS: 4. Admitted to stroke unit for further work up and management.     Of note, pt had prior hospitalization in [place] in 11/2021 for right hemiparesis, which caused hemorrhagic conversion. Since then, had residual R hemiparesis. Was not placed on any anticoagulation after the hosptialization.    During this hospitalization, pt underwent following imaging:  CT HEAD (06.18.22 @ 20:33): No acute intracranial bleeding. Chronic infarction of the   posterior limb of left internal capsule with Wallerian degeneration into   the cerebral peduncle of the midbrain.    CTA BRAIN (06.18.22 @ 20:33): Patent central intracranial circulation. No flow-limiting   stenosis or occlusion. Focal filling defects in the superior sagittal sinus and right transverse   sinus favored to reflect atypical appearance of arachnoid granulation   rather than nonocclusive thrombi. No associated parenchymal abnormality   is noted. MRI brain with contrast may be helpful for further   clarification.    CTA NECK (06.18.22 @ 20:33): Patent cervical vasculature. No flow limiting stenosis or   occlusion.    MR Head w/wo IV Cont (06.19.22 @ 15:32):  1)  Evidence of an old hemorrhage in the left the posterior basal ganglia   and retrolenticular region with gliosis and associated wallerian   degeneration in the left brainstem.  2)  otherwise mild chronic ischemic changes and volume loss. No acute   abnormality suggested. No diffusion restriction or MR evidence of acute   ischemia. See above.    MR Venogram Head w/ IV Cont (06.19.22 @ 14:29): No evidence of venous sinus thrombosis.    Pt also found to have urinary tract infection with Proteus mirabilis and started on Rocephin 1g on 6/19.     Impression is that pt had resolved slurring of speech of unclear etiology. Localization could be diffuse brain function. Etiology suspected to be UTI vs TIA given resolved sx and negative imaging.     Pt is now medically and neurologically stable for discharge to [facility].     80 yo F PMH of Dementia, depression, glaucoma, muscular dystrophy, CVA in 11/2021 with residual R hemiparesis from [facility] who presented 6/18 as a code stroke for slurred speech and aphasia since 13:00 hr on 6/16. Had presented to [facility] ED on 6/17 for similar symptom and they had resolved by the end of the visit. Underwent CT brain w/o only which showed no acute intracranial hemorrhage. Had chronic lacunar infarct within posterior limb of the L external capsule with associated wallerian degeneration of the L corticospinal tract. Initially discharged on 6/17 but returned on 6/18 for vessel imaging. When pt returned on 6/18, NIHSS: 10  (LUE +1, RUE +3, LLE +1, RLE +4, sensory +1)  pain limited, incr weakness in RUE and LLE per NIHSS but pt/family state no new deficits. preMRS: 4. Admitted to stroke unit for further work up and management.     Of note, pt had prior hospitalization in [place] in 11/2021 for right hemiparesis, which caused hemorrhagic conversion. Since then, had residual R hemiparesis. Was not placed on any anticoagulation after the hosptialization.    During this hospitalization, pt underwent following imaging:  CT HEAD (06.18.22 @ 20:33): No acute intracranial bleeding. Chronic infarction of the   posterior limb of left internal capsule with Wallerian degeneration into   the cerebral peduncle of the midbrain.    CTA BRAIN (06.18.22 @ 20:33): Patent central intracranial circulation. No flow-limiting   stenosis or occlusion. Focal filling defects in the superior sagittal sinus and right transverse   sinus favored to reflect atypical appearance of arachnoid granulation   rather than nonocclusive thrombi. No associated parenchymal abnormality   is noted. MRI brain with contrast may be helpful for further   clarification.    CTA NECK (06.18.22 @ 20:33): Patent cervical vasculature. No flow limiting stenosis or   occlusion.    MR Head w/wo IV Cont (06.19.22 @ 15:32):  1)  Evidence of an old hemorrhage in the left the posterior basal ganglia   and retrolenticular region with gliosis and associated wallerian   degeneration in the left brainstem.  2)  otherwise mild chronic ischemic changes and volume loss. No acute   abnormality suggested. No diffusion restriction or MR evidence of acute   ischemia. See above.    MR Venogram Head w/ IV Cont (06.19.22 @ 14:29): No evidence of venous sinus thrombosis.    Pt also found to have urinary tract infection with Proteus mirabilis and started on Rocephin 1g on 6/19 and 6/20. Plan to transition to bactrim DS 1 tab qd starting 6/21, to be continued to 6/25 to complete 7 day course.     Impression is that pt had resolved slurring of speech of unclear etiology. Localization could be diffuse brain function. Etiology suspected to be UTI vs TIA given resolved sx and negative imaging. Given prior stroke history, pt will complete work up for arrythmia with outpatient cardiologist.    Pt is now medically and neurologically stable for discharge to [facility].     82 yo F PMH of Dementia, depression, glaucoma, muscular dystrophy, CVA in 11/2021 with residual R hemiparesis from [facility] who presented 6/18 as a code stroke for slurred speech and aphasia since 13:00 hr on 6/16. Had presented to [facility] ED on 6/17 for similar symptom and they had resolved by the end of the visit. Underwent CT brain w/o only which showed no acute intracranial hemorrhage. Had chronic lacunar infarct within posterior limb of the L external capsule with associated wallerian degeneration of the L corticospinal tract. Initially discharged on 6/17 but returned on 6/18 for vessel imaging. When pt returned on 6/18, NIHSS: 10  (LUE +1, RUE +3, LLE +1, RLE +4, sensory +1)  pain limited, incr weakness in RUE and LLE per NIHSS but pt/family state no new deficits. preMRS: 4. Admitted to stroke unit for further work up and management.     Of note, pt had prior hospitalization in [place] in 11/2021 for right hemiparesis, which caused hemorrhagic conversion. Since then, had residual R hemiparesis. Was not placed on any anticoagulation after the hosptialization.    During this hospitalization, pt underwent following imaging:  CT HEAD (06.18.22 @ 20:33): No acute intracranial bleeding. Chronic infarction of the   posterior limb of left internal capsule with Wallerian degeneration into   the cerebral peduncle of the midbrain.    CTA BRAIN (06.18.22 @ 20:33): Patent central intracranial circulation. No flow-limiting   stenosis or occlusion. Focal filling defects in the superior sagittal sinus and right transverse   sinus favored to reflect atypical appearance of arachnoid granulation   rather than nonocclusive thrombi. No associated parenchymal abnormality   is noted. MRI brain with contrast may be helpful for further   clarification.    CTA NECK (06.18.22 @ 20:33): Patent cervical vasculature. No flow limiting stenosis or   occlusion.    MR Head w/wo IV Cont (06.19.22 @ 15:32):  1)  Evidence of an old hemorrhage in the left the posterior basal ganglia   and retrolenticular region with gliosis and associated wallerian   degeneration in the left brainstem.  2)  otherwise mild chronic ischemic changes and volume loss. No acute   abnormality suggested. No diffusion restriction or MR evidence of acute   ischemia. See above.    MR Venogram Head w/ IV Cont (06.19.22 @ 14:29): No evidence of venous sinus thrombosis.    Pt also found to have urinary tract infection with Proteus mirabilis and started on Rocephin 1g on 6/19 and 6/20. Plan to transition to bactrim DS 1 tab qd starting 6/21, to be continued to 6/25 to complete 7 day course.     Impression is that pt had resolved slurring of speech of unclear etiology. Localization could be diffuse brain function. Etiology suspected to be UTI vs TIA given resolved sx and negative imaging. Given prior stroke history, pt will complete work up for arrythmia with outpatient cardiologist. Received 4th dose of COVID 19 Pfizer booster prior to discharge.     Pt is now medically and neurologically stable for discharge to [facility].     82 yo F PMH of Dementia, depression, glaucoma, muscular dystrophy, CVA in 11/2021 with residual R hemiparesis from [facility] who presented 6/18 as a code stroke for slurred speech and aphasia since 13:00 hr on 6/16. Had presented to [facility] ED on 6/17 for similar symptom and they had resolved by the end of the visit. Underwent CT brain w/o only which showed no acute intracranial hemorrhage. Had chronic lacunar infarct within posterior limb of the L external capsule with associated wallerian degeneration of the L corticospinal tract. Initially discharged on 6/17 but returned on 6/18 for vessel imaging. When pt returned on 6/18, NIHSS: 10  (LUE +1, RUE +3, LLE +1, RLE +4, sensory +1)  pain limited, incr weakness in RUE and LLE per NIHSS but pt/family state no new deficits. preMRS: 4. Admitted to stroke unit for further work up and management.     Of note, pt had prior hospitalization in [place] in 11/2021 for right hemiparesis, which caused hemorrhagic conversion. Since then, had residual R hemiparesis. Was not placed on any anticoagulation after the hosptialization.    During this hospitalization, pt underwent following imaging:  CT HEAD (06.18.22 @ 20:33): No acute intracranial bleeding. Chronic infarction of the   posterior limb of left internal capsule with Wallerian degeneration into   the cerebral peduncle of the midbrain.    CTA BRAIN (06.18.22 @ 20:33): Patent central intracranial circulation. No flow-limiting   stenosis or occlusion. Focal filling defects in the superior sagittal sinus and right transverse   sinus favored to reflect atypical appearance of arachnoid granulation   rather than nonocclusive thrombi. No associated parenchymal abnormality   is noted. MRI brain with contrast may be helpful for further   clarification.    CTA NECK (06.18.22 @ 20:33): Patent cervical vasculature. No flow limiting stenosis or   occlusion.    MR Head w/wo IV Cont (06.19.22 @ 15:32):  1)  Evidence of an old hemorrhage in the left the posterior basal ganglia   and retrolenticular region with gliosis and associated wallerian   degeneration in the left brainstem.  2)  otherwise mild chronic ischemic changes and volume loss. No acute   abnormality suggested. No diffusion restriction or MR evidence of acute   ischemia. See above.    MR Venogram Head w/ IV Cont (06.19.22 @ 14:29): No evidence of venous sinus thrombosis.    Pt also found to have urinary tract infection with Proteus mirabilis and started on Rocephin 1g on 6/19 and 6/20. Plan to transition to bactrim DS 1 tab qd starting 6/21, to be continued to 6/25 to complete 7 day course.     Impression is that pt had resolved slurring of speech of unclear etiology. Localization could be diffuse brain function vs perhaps L hemispheric dysfunction. Etiology suspected to be UTI vs TIA given resolved sx and negative imaging. Given prior stroke history, pt will complete work up for arrythmia with outpatient cardiologist. Received 4th dose of COVID 19 Pfizer booster prior to discharge.     Pt is now medically and neurologically stable for discharge to [facility].

## 2022-06-20 NOTE — DISCHARGE NOTE NURSING/CASE MANAGEMENT/SOCIAL WORK - NSDCPEFALRISK_GEN_ALL_CORE
For information on Fall & Injury Prevention, visit: [email or web address] OR  [email or web address] OR  https://www.cdc.gov/steadi/patient.html

## 2022-06-20 NOTE — CONSULT NOTE ADULT - SUBJECTIVE AND OBJECTIVE BOX
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 At that time, per family, pt was saying words in a random order and not making sense. No worsening of baseline weakness per patient and family, LUE drift is not new (2/2 dystrophy). Pt is on eliquis at home, for unknown reason. Pt denies any recent HA, n/v, changes in vision/hearing, head trauma but has some neck pain.  Cannot ambulate independently at baseline, needs help with activities. NIHSS 7 (LUE drift (2/2 dystrophy), RUE some effort, LLE no drift, RLE no effort, moderate sensory loss in RUE/RLE). PreMRS 4. The evaluation at that time was new onset resolved dysarthria and aphasia possibly 2/2 TIA. CT head then showed no acute intracranial hemorrhage. 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 Patient was sent out from the ED. Stroke neuro attending Dr Richard Libman requested patient come to ED to obtain circulation vessel imaging. Here, patient and aide report no new worsening symptoms since before. Has been having L sided lower back pain limiting LLE movement. Confirmed with daughter patient taking eliquis at facility. Unclear etiology of stroke. *Update, called [facility] rehab for med rec and appears patient may not be on eliquis or aspirin.     (Stroke only)  NIHSS: 10  (LUE +1, RUE +3, LLE +1, RLE +4, sensory +1)  pain limited, incr weakness in RUE and LLE per NIHSS but pt/family state no new deficits  preMRS: 4   (19 Jun 2022 00:12)    Interval History:  CTA showed patent intracranial arteries  and neck vaculature however focal filling defects in the superior sagittal sinus and right transverse sinus favored to reflect atypical appearance of arachnoid granulation rather than nonocclusive thrombi. No acute findings on MRI.       Imaging showed (reviewed):    (06.18.22 @ 20:33)   CT HEAD: No acute intracranial bleeding. Chronic infarction of the   posterior limb of left internal capsule with Wallerian degeneration into   the cerebral peduncle of the midbrain.    CTA BRAIN: Patent central intracranial circulation.No flow-limiting   stenosis or occlusion.    Focal filling defects in the superior sagittal sinus and right transverse   sinus favored to reflect atypical appearance of arachnoid granulation   rather than nonocclusive thrombi. No associated parenchymal abnormality   is noted. MRI brain with contrast may be helpful for further   clarification.    CTA NECK: Patent cervical vasculature. No flow limiting stenosis or   occlusion.     CT Brain Stroke Protocol (06.17.22 @ 11:45)    No acute intracranial hemorrhage.    Chronic lacunar infarct within the posterior limb of the left external   capsule with associated wallerian degeneration of the left corticospinal   tract. Findings are new when compared with 4/3/2016.    < from: MR Head w/wo IV Cont (06.19.22 @ 15:32) >    IMPRESSION:    1)  Evidence of an old hemorrhage in the left the posterior basal ganglia   and retrolenticular region with gliosis and associated wallerian   degeneration in the left brainstem.  2)  otherwise mild chronic ischemic changes and volume loss. No acute   abnormality suggested. No diffusion restriction or MR evidence of acute   ischemia. See above.    --- End of Report ---    < end of copied text >    < from: MR Venogram Head w/ IV Cont (06.19.22 @ 14:29) >    IMPRESSION:  No evidence of venous sinus thrombosis.    --- End of Report ---    < end of copied text >        REVIEW OF SYSTEMS  Constitutional - No fever, No weight loss, No fatigue  HEENT - No eye pain, No visual disturbances, No difficulty hearing, No tinnitus, No vertigo, No neck pain  Respiratory - No cough, No wheezing, No shortness of breath  Cardiovascular - No chest pain, No palpitations  Gastrointestinal - No abdominal pain, No nausea, No vomiting, No diarrhea, No constipation  Genitourinary - No dysuria, No frequency, No hematuria, No incontinence  Neurological - No headaches, No memory loss, No loss of strength, No numbness, No tremors  Skin - No itching, No rashes, No lesions   Endocrine - No temperature intolerance  Musculoskeletal - No joint pain, No joint swelling, No muscle pain  Psychiatric - No depression, No anxiety    VITALS  T(C): 36.7 (06-20-22 @ 05:25), Max: 36.8 (06-19-22 @ 21:49)  HR: 91 (06-20-22 @ 05:25) (65 - 91)  BP: 109/65 (06-20-22 @ 05:25) (109/65 - 140/78)  RR: 18 (06-20-22 @ 05:25) (18 - 18)  SpO2: 94% (06-20-22 @ 05:25) (94% - 97%)  Wt(kg): --    PAST MEDICAL & SURGICAL HISTORY  Muscular dystrophy    Osteoporosis    GERD (gastroesophageal reflux disease)    Pulmonary embolism    Dysphagia    Dementia, senile with depression    Dementia    Left foot drop    Glaucoma    Hearing loss    Contracture of hand joint, right    History of perforated ear drum    NO SIGNIFICANT PAST SURGICAL HISTORY    No significant past surgical history    Tendon disorder    Right arm fracture        SOCIAL HISTORY  Smoking - Denied  EtOH - Denied   Drugs - Denied    FUNCTIONAL HISTORY   PTA pt lived at [facility] as a long term resident and discharge plan is to return to [facility] at discharge. Daughter also advised pt has 2( 12 hr HHA) private pay in place at discharge. Pt had begun receiving physical therapy over last 2 weeks  Needs assistance with ambulation and ADLs PTA     CURRENT FUNCTIONAL STATUS  PT 6/19  BM- Max A  Trans- Max A   Gait- Max A, side step to HOB     OT Pending       FAMILY HISTORY   FH: throat cancer    FH: heart disease        RECENT LABS/IMAGING  CBC Full  -  ( 19 Jun 2022 05:45 )  WBC Count : 9.20 K/uL  RBC Count : 4.13 M/uL  Hemoglobin : 12.8 g/dL  Hematocrit : 40.4 %  Platelet Count - Automated : 228 K/uL  Mean Cell Volume : 97.8 fl  Mean Cell Hemoglobin : 31.0 pg  Mean Cell Hemoglobin Concentration : 31.7 gm/dL  Auto Neutrophil # : x  Auto Lymphocyte # : x  Auto Monocyte # : x  Auto Eosinophil # : x  Auto Basophil # : x  Auto Neutrophil % : x  Auto Lymphocyte % : x  Auto Monocyte % : x  Auto Eosinophil % : x  Auto Basophil % : x    06-19    138  |  103  |  15  ----------------------------<  100<H>  3.9   |  25  |  0.50    Ca    9.1      19 Jun 2022 05:45          ALLERGIES  No Known Allergies      MEDICATIONS  (STANDING):  artificial  tears Solution 1 Drop(s) Both EYES two times a day  aspirin  chewable 81 milliGRAM(s) Oral daily  atorvastatin 80 milliGRAM(s) Oral at bedtime  cefTRIAXone   IVPB      cefTRIAXone   IVPB 1000 milliGRAM(s) IV Intermittent every 24 hours  cyclobenzaprine 5 milliGRAM(s) Oral two times a day  DULoxetine 60 milliGRAM(s) Oral two times a day  enoxaparin Injectable 40 milliGRAM(s) SubCutaneous every 24 hours  famotidine    Tablet 20 milliGRAM(s) Oral at bedtime  melatonin 5 milliGRAM(s) Oral at bedtime  senna 2 Tablet(s) Oral at bedtime    MEDICATIONS  (PRN):  acetaminophen     Tablet .. 650 milliGRAM(s) Oral every 6 hours PRN Temp greater or equal to 38C (100.4F), Mild Pain (1 - 3), Moderate Pain (4 - 6), Severe Pain (7 - 10)        ----------------------------------------------------------------------------------------  PHYSICAL EXAM  Constitutional - NAD, Comfortable  HEENT - NCAT, EOMI  Neck - Supple, No limited ROM  Chest - Breathing comfortably, No wheezing  Cardiovascular - S1S2   Abdomen - Soft   Extremities - No C/C/E, No calf tenderness   Neurologic Exam -                    Cognitive - Awake, Alert, AAO to self, place, date, year, situation     Communication - Fluent, No dysarthria     Cranial Nerves - CN 2-12 intact     Motor - No focal deficits                    LEFT    UE - ShAB 5/5, EF 5/5, EE 5/5, WE 5/5,  5/5                    RIGHT UE - ShAB 5/5, EF 5/5, EE 5/5, WE 5/5,  5/5                    LEFT    LE - HF 5/5, KE 5/5, DF 5/5, PF 5/5                    RIGHT LE - HF 5/5, KE 5/5, DF 5/5, PF 5/5        Sensory - Intact to LT     Reflexes - DTR Intact, No primitive reflexive     Coordination - FTN intact     OculoVestibular - No saccades, No nystagmus, VOR         Balance - WNL Static  Psychiatric - Mood stable, Affect WNL  ----------------------------------------------------------------------------------------  ASSESSMENT/PLAN  81yFemale with functional deficits after aphasia with prior CVA   Aphasia with prior CVA- Images negative for acute stroke, ASA, statin   UTI- Ceftriaxone   Dysphagia diet- Puree  Pain - Tylenol PRN, cymbalta, flexeril   GI ppx- famotidine   DVT PPX - Lovenox   Therapy - continue PT for transfers, gait, OT for ADLs, SLP for speech and swallow   Rehab - Will continue to follow for ongoing rehab needs and recommendations.    Recommend ACUTE inpatient rehabilitation for the functional deficits consisting of 3 hours of therapy/day & 24 hour RN/daily PMR physician for comorbid medical management. Patient will be able to tolerate 3 hours a day.   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 At that time, per family, pt was saying words in a random order and not making sense. No worsening of baseline weakness per patient and family, LUE drift is not new (2/2 dystrophy). Pt is on eliquis at home, for unknown reason. Pt denies any recent HA, n/v, changes in vision/hearing, head trauma but has some neck pain.  Cannot ambulate independently at baseline, needs help with activities. NIHSS 7 (LUE drift (2/2 dystrophy), RUE some effort, LLE no drift, RLE no effort, moderate sensory loss in RUE/RLE). PreMRS 4. The evaluation at that time was new onset resolved dysarthria and aphasia possibly 2/2 TIA. CT head then showed no acute intracranial hemorrhage. 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 Patient was sent out from the ED. Stroke neuro attending Dr Richard Libman requested patient come to ED to obtain circulation vessel imaging. Here, patient and aide report no new worsening symptoms since before. Has been having L sided lower back pain limiting LLE movement. Confirmed with daughter patient taking eliquis at facility. Unclear etiology of stroke. *Update, called [facility] rehab for med rec and appears patient may not be on eliquis or aspirin.     (Stroke only)  NIHSS: 10  (LUE +1, RUE +3, LLE +1, RLE +4, sensory +1)  pain limited, incr weakness in RUE and LLE per NIHSS but pt/family state no new deficits  preMRS: 4   (19 Jun 2022 00:12)    Interval History:  CTA showed patent intracranial arteries  and neck vaculature however focal filling defects in the superior sagittal sinus and right transverse sinus favored to reflect atypical appearance of arachnoid granulation rather than nonocclusive thrombi. No acute findings on MRI.     Patient seen at the bedside, doing well. As per HHA at bedside, patient is pretty much back to baseline. She has residual right sided weakness from prior CVA. At baseline she is forgetful.       Imaging showed (reviewed):    (06.18.22 @ 20:33)   CT HEAD: No acute intracranial bleeding. Chronic infarction of the   posterior limb of left internal capsule with Wallerian degeneration into   the cerebral peduncle of the midbrain.    CTA BRAIN: Patent central intracranial circulation.No flow-limiting   stenosis or occlusion.    Focal filling defects in the superior sagittal sinus and right transverse   sinus favored to reflect atypical appearance of arachnoid granulation   rather than nonocclusive thrombi. No associated parenchymal abnormality   is noted. MRI brain with contrast may be helpful for further   clarification.    CTA NECK: Patent cervical vasculature. No flow limiting stenosis or   occlusion.     CT Brain Stroke Protocol (06.17.22 @ 11:45)    No acute intracranial hemorrhage.    Chronic lacunar infarct within the posterior limb of the left external   capsule with associated wallerian degeneration of the left corticospinal   tract. Findings are new when compared with 4/3/2016.    < from: MR Head w/wo IV Cont (06.19.22 @ 15:32) >    IMPRESSION:    1)  Evidence of an old hemorrhage in the left the posterior basal ganglia   and retrolenticular region with gliosis and associated wallerian   degeneration in the left brainstem.  2)  otherwise mild chronic ischemic changes and volume loss. No acute   abnormality suggested. No diffusion restriction or MR evidence of acute   ischemia. See above.    --- End of Report ---    < end of copied text >    < from: MR Venogram Head w/ IV Cont (06.19.22 @ 14:29) >    IMPRESSION:  No evidence of venous sinus thrombosis.    --- End of Report ---    < end of copied text >        REVIEW OF SYSTEMS  Constitutional - No fever, No weight loss, +fatigue   HEENT - No eye pain, No visual disturbances, No difficulty hearing, No tinnitus, No vertigo, No neck pain  Respiratory - No cough, No wheezing, No shortness of breath  Cardiovascular - No chest pain, No palpitations  Gastrointestinal - No abdominal pain, No nausea, No vomiting, No diarrhea, No constipation  Genitourinary - No dysuria, No frequency, No hematuria, No incontinence  Neurological - +chronic right sided weakness, +memory issues   Skin - No itching, No rashes, No lesions   Endocrine - No temperature intolerance  Musculoskeletal - No joint pain, No joint swelling, No muscle pain  Psychiatric - No depression, No anxiety    VITALS  T(C): 36.7 (06-20-22 @ 05:25), Max: 36.8 (06-19-22 @ 21:49)  HR: 91 (06-20-22 @ 05:25) (65 - 91)  BP: 109/65 (06-20-22 @ 05:25) (109/65 - 140/78)  RR: 18 (06-20-22 @ 05:25) (18 - 18)  SpO2: 94% (06-20-22 @ 05:25) (94% - 97%)  Wt(kg): --    PAST MEDICAL & SURGICAL HISTORY  Muscular dystrophy    Osteoporosis    GERD (gastroesophageal reflux disease)    Pulmonary embolism    Dysphagia    Dementia, senile with depression    Dementia    Left foot drop    Glaucoma    Hearing loss    Contracture of hand joint, right    History of perforated ear drum    NO SIGNIFICANT PAST SURGICAL HISTORY    No significant past surgical history    Tendon disorder    Right arm fracture        SOCIAL HISTORY  Smoking - Denied  EtOH - Denied   Drugs - Denied    FUNCTIONAL HISTORY   PTA pt lived at [facility] as a long term resident and discharge plan is to return to [facility] at discharge. Daughter also advised pt has 2( 12 hr HHA) private pay in place at discharge. Pt had begun receiving physical therapy over last 2 weeks  Needs assistance with ambulation and ADLs PTA     CURRENT FUNCTIONAL STATUS  PT 6/19  BM- Max A  Trans- Max A   Gait- Max A, side step to HOB     OT Pending       FAMILY HISTORY   FH: throat cancer    FH: heart disease        RECENT LABS/IMAGING  CBC Full  -  ( 19 Jun 2022 05:45 )  WBC Count : 9.20 K/uL  RBC Count : 4.13 M/uL  Hemoglobin : 12.8 g/dL  Hematocrit : 40.4 %  Platelet Count - Automated : 228 K/uL  Mean Cell Volume : 97.8 fl  Mean Cell Hemoglobin : 31.0 pg  Mean Cell Hemoglobin Concentration : 31.7 gm/dL  Auto Neutrophil # : x  Auto Lymphocyte # : x  Auto Monocyte # : x  Auto Eosinophil # : x  Auto Basophil # : x  Auto Neutrophil % : x  Auto Lymphocyte % : x  Auto Monocyte % : x  Auto Eosinophil % : x  Auto Basophil % : x    06-19    138  |  103  |  15  ----------------------------<  100<H>  3.9   |  25  |  0.50    Ca    9.1      19 Jun 2022 05:45          ALLERGIES  No Known Allergies      MEDICATIONS  (STANDING):  artificial  tears Solution 1 Drop(s) Both EYES two times a day  aspirin  chewable 81 milliGRAM(s) Oral daily  atorvastatin 80 milliGRAM(s) Oral at bedtime  cefTRIAXone   IVPB      cefTRIAXone   IVPB 1000 milliGRAM(s) IV Intermittent every 24 hours  cyclobenzaprine 5 milliGRAM(s) Oral two times a day  DULoxetine 60 milliGRAM(s) Oral two times a day  enoxaparin Injectable 40 milliGRAM(s) SubCutaneous every 24 hours  famotidine    Tablet 20 milliGRAM(s) Oral at bedtime  melatonin 5 milliGRAM(s) Oral at bedtime  senna 2 Tablet(s) Oral at bedtime    MEDICATIONS  (PRN):  acetaminophen     Tablet .. 650 milliGRAM(s) Oral every 6 hours PRN Temp greater or equal to 38C (100.4F), Mild Pain (1 - 3), Moderate Pain (4 - 6), Severe Pain (7 - 10)        ----------------------------------------------------------------------------------------  PHYSICAL EXAM  Constitutional - NAD, Comfortable  HEENT - NCAT   Neck - Supple, No limited ROM  Chest - Breathing comfortably, No wheezing  Cardiovascular - S1S2   Abdomen - Soft   Extremities - No C/C/E, No calf tenderness   Neurologic Exam -                    Cognitive - Awake alert oriented to self, place, year      Communication - No dysarthria, hypophonic delayed processing      Cranial Nerves - No noticeable facial droop      Motor - No focal deficits                    LEFT    UE - antigravity                     RIGHT UE - 2/5                     LEFT    LE - antigravity                     RIGHT LE - 2/5      Sensory - Intact to LT  Psychiatric - Mood stable, Affect WNL  ----------------------------------------------------------------------------------------  ASSESSMENT/PLAN  81yFemale with functional deficits after aphasia with prior CVA     Aphasia with prior CVA- Images negative for acute stroke, ASA, statin   UTI- Ceftriaxone   Dysphagia diet- Puree  Pain - Tylenol PRN, cymbalta, flexeril   GI ppx- famotidine   DVT PPX - Lovenox   Therapy - continue PT for transfers, gait, OT for ADLs, SLP for speech and swallow   Rehab - Will continue to follow for ongoing rehab needs and recommendations.   Recommend to return back to [facility], after she is medically stable for discharge from the hospital.       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 At that time, per family, pt was saying words in a random order and not making sense. No worsening of baseline weakness per patient and family, LUE drift is not new (2/2 dystrophy). Pt is on eliquis at home, for unknown reason. Pt denies any recent HA, n/v, changes in vision/hearing, head trauma but has some neck pain.  Cannot ambulate independently at baseline, needs help with activities. NIHSS 7 (LUE drift (2/2 dystrophy), RUE some effort, LLE no drift, RLE no effort, moderate sensory loss in RUE/RLE). PreMRS 4. The evaluation at that time was new onset resolved dysarthria and aphasia possibly 2/2 TIA. CT head then showed no acute intracranial hemorrhage. 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 Patient was sent out from the ED. Stroke neuro attending Dr Richard Libman requested patient come to ED to obtain circulation vessel imaging. Here, patient and aide report no new worsening symptoms since before. Has been having L sided lower back pain limiting LLE movement. Confirmed with daughter patient taking eliquis at facility. Unclear etiology of stroke. *Update, called [facility] rehab for med rec and appears patient may not be on eliquis or aspirin.     (Stroke only)  NIHSS: 10  (LUE +1, RUE +3, LLE +1, RLE +4, sensory +1)  pain limited, incr weakness in RUE and LLE per NIHSS but pt/family state no new deficits  preMRS: 4   (19 Jun 2022 00:12)    Interval History:  CTA showed patent intracranial arteries  and neck vaculature however focal filling defects in the superior sagittal sinus and right transverse sinus favored to reflect atypical appearance of arachnoid granulation rather than nonocclusive thrombi. No acute findings on MRI.     Patient seen at the bedside, doing well. As per HHA at bedside, patient is pretty much back to baseline. She has residual right sided weakness from prior CVA. At baseline she is forgetful.       Imaging showed (reviewed):    (06.18.22 @ 20:33)   CT HEAD: No acute intracranial bleeding. Chronic infarction of the   posterior limb of left internal capsule with Wallerian degeneration into   the cerebral peduncle of the midbrain.    CTA BRAIN: Patent central intracranial circulation.No flow-limiting   stenosis or occlusion.    Focal filling defects in the superior sagittal sinus and right transverse   sinus favored to reflect atypical appearance of arachnoid granulation   rather than nonocclusive thrombi. No associated parenchymal abnormality   is noted. MRI brain with contrast may be helpful for further   clarification.    CTA NECK: Patent cervical vasculature. No flow limiting stenosis or   occlusion.     CT Brain Stroke Protocol (06.17.22 @ 11:45)    No acute intracranial hemorrhage.    Chronic lacunar infarct within the posterior limb of the left external   capsule with associated wallerian degeneration of the left corticospinal   tract. Findings are new when compared with 4/3/2016.    < from: MR Head w/wo IV Cont (06.19.22 @ 15:32) >    IMPRESSION:    1)  Evidence of an old hemorrhage in the left the posterior basal ganglia   and retrolenticular region with gliosis and associated wallerian   degeneration in the left brainstem.  2)  otherwise mild chronic ischemic changes and volume loss. No acute   abnormality suggested. No diffusion restriction or MR evidence of acute   ischemia. See above.    --- End of Report ---    < end of copied text >    < from: MR Venogram Head w/ IV Cont (06.19.22 @ 14:29) >    IMPRESSION:  No evidence of venous sinus thrombosis.    --- End of Report ---    < end of copied text >        REVIEW OF SYSTEMS  Constitutional - No fever, No weight loss, +fatigue   HEENT - No eye pain, No visual disturbances, No difficulty hearing, No tinnitus, No vertigo, No neck pain  Respiratory - No cough, No wheezing, No shortness of breath  Cardiovascular - No chest pain, No palpitations  Gastrointestinal - No abdominal pain, No nausea, No vomiting, No diarrhea, No constipation  Genitourinary - No dysuria, No frequency, No hematuria, No incontinence  Neurological - +chronic right sided weakness, +memory issues   Skin - No itching, No rashes, No lesions   Endocrine - No temperature intolerance  Musculoskeletal - No joint pain, No joint swelling, No muscle pain  Psychiatric - No depression, No anxiety    VITALS  T(C): 36.7 (06-20-22 @ 05:25), Max: 36.8 (06-19-22 @ 21:49)  HR: 91 (06-20-22 @ 05:25) (65 - 91)  BP: 109/65 (06-20-22 @ 05:25) (109/65 - 140/78)  RR: 18 (06-20-22 @ 05:25) (18 - 18)  SpO2: 94% (06-20-22 @ 05:25) (94% - 97%)  Wt(kg): --    PAST MEDICAL & SURGICAL HISTORY  Muscular dystrophy    Osteoporosis    GERD (gastroesophageal reflux disease)    Pulmonary embolism    Dysphagia    Dementia, senile with depression    Dementia    Left foot drop    Glaucoma    Hearing loss    Contracture of hand joint, right    History of perforated ear drum    NO SIGNIFICANT PAST SURGICAL HISTORY    No significant past surgical history    Tendon disorder    Right arm fracture        SOCIAL HISTORY  Smoking - Denied  EtOH - Denied   Drugs - Denied    FUNCTIONAL HISTORY   PTA pt lived at [facility] as a long term resident and discharge plan is to return to [facility] at discharge. Daughter also advised pt has 2( 12 hr HHA) private pay in place at discharge. Pt had begun receiving physical therapy over last 2 weeks  Needs assistance with ambulation and ADLs PTA     CURRENT FUNCTIONAL STATUS  PT 6/19  BM- Max A  Trans- Max A   Gait- Max A, side step to HOB     OT Pending       FAMILY HISTORY   FH: throat cancer    FH: heart disease        RECENT LABS/IMAGING  CBC Full  -  ( 19 Jun 2022 05:45 )  WBC Count : 9.20 K/uL  RBC Count : 4.13 M/uL  Hemoglobin : 12.8 g/dL  Hematocrit : 40.4 %  Platelet Count - Automated : 228 K/uL  Mean Cell Volume : 97.8 fl  Mean Cell Hemoglobin : 31.0 pg  Mean Cell Hemoglobin Concentration : 31.7 gm/dL  Auto Neutrophil # : x  Auto Lymphocyte # : x  Auto Monocyte # : x  Auto Eosinophil # : x  Auto Basophil # : x  Auto Neutrophil % : x  Auto Lymphocyte % : x  Auto Monocyte % : x  Auto Eosinophil % : x  Auto Basophil % : x    06-19    138  |  103  |  15  ----------------------------<  100<H>  3.9   |  25  |  0.50    Ca    9.1      19 Jun 2022 05:45          ALLERGIES  No Known Allergies      MEDICATIONS  (STANDING):  artificial  tears Solution 1 Drop(s) Both EYES two times a day  aspirin  chewable 81 milliGRAM(s) Oral daily  atorvastatin 80 milliGRAM(s) Oral at bedtime  cefTRIAXone   IVPB      cefTRIAXone   IVPB 1000 milliGRAM(s) IV Intermittent every 24 hours  cyclobenzaprine 5 milliGRAM(s) Oral two times a day  DULoxetine 60 milliGRAM(s) Oral two times a day  enoxaparin Injectable 40 milliGRAM(s) SubCutaneous every 24 hours  famotidine    Tablet 20 milliGRAM(s) Oral at bedtime  melatonin 5 milliGRAM(s) Oral at bedtime  senna 2 Tablet(s) Oral at bedtime    MEDICATIONS  (PRN):  acetaminophen     Tablet .. 650 milliGRAM(s) Oral every 6 hours PRN Temp greater or equal to 38C (100.4F), Mild Pain (1 - 3), Moderate Pain (4 - 6), Severe Pain (7 - 10)        ----------------------------------------------------------------------------------------  PHYSICAL EXAM  Constitutional - NAD, Comfortable  HEENT - NCAT   Neck - Supple, No limited ROM  Chest - Breathing comfortably, No wheezing  Cardiovascular - S1S2   Abdomen - Soft   Extremities - No C/C/E, No calf tenderness   Neurologic Exam -                    Cognitive - Awake alert oriented to self, place, year      Communication - No dysarthria, hypophonic delayed processing      Cranial Nerves - No noticeable facial droop      Motor - No focal deficits                    LEFT    UE - antigravity                     RIGHT UE - 2/5                     LEFT    LE - antigravity                     RIGHT LE - 2/5      Sensory - Intact to LT  Psychiatric - Mood stable, Affect WNL  ----------------------------------------------------------------------------------------  ASSESSMENT/PLAN  81yFemale with functional deficits after aphasia with prior CVA     Aphasia with prior CVA- Images negative for acute stroke, ASA, statin   UTI- Ceftriaxone   Dysphagia diet- Puree  Pain - Tylenol PRN, cymbalta, flexeril   GI ppx- famotidine   DVT PPX - Lovenox   Therapy - continue PT for transfers, gait, OT for ADLs, SLP for speech and swallow   Rehab - Will continue to follow for ongoing rehab needs and recommendations.   Recommend to return back to [facility] rehab with [facility], after she is medically stable for discharge from the hospital.

## 2022-06-20 NOTE — DISCHARGE NOTE PROVIDER - PROVIDER TOKENS
PROVIDER:[TOKEN:[[id number]]] PROVIDER:[TOKEN:[[id number]]],PROVIDER:[TOKEN:[[id number]]]

## 2022-06-20 NOTE — DISCHARGE NOTE NURSING/CASE MANAGEMENT/SOCIAL WORK - PATIENT PORTAL LINK FT
You can access the FollowMyHealth Patient Portal offered by [facility] by registering at the following website: [email or web address]. By joining [employer]’s FollowMyHealth portal, you will also be able to view your health information using other applications (apps) compatible with our system.

## 2022-06-20 NOTE — DISCHARGE NOTE PROVIDER - NSDCCPCAREPLAN_GEN_ALL_CORE_FT
PRINCIPAL DISCHARGE DIAGNOSIS  Diagnosis: Aphasia  Assessment and Plan of Treatment: You were noted to have trouble with speech production as well as slurring of speech. Symptoms resolved when you were being evaluated at the emergency room. You underwent CT imaging of the brain and MRI brain as well as MR angiogram for the vessels of your brain. It showed the old hemorrhage in the left posterior basal ganglia with association of wallerian degeneration into the left brainstem. This MRI result is less likely cause of the transient speech disturbance. You were noted to have urinary tract infection as well which could have contributed to your symptoms. Given prior stroke, please continue to take aspirin and atorvastatin as prescribed. Please follow up with your outpatient neurologist to further manage risk factors and prevent future strokes. If you have any new one sided weakness, facial droop, difficulty with speech production or slurring of speech, or gait imbalance, please return to the emergency room for urgent work up and evaluation.      SECONDARY DISCHARGE DIAGNOSES  Diagnosis: Urinary tract infection  Assessment and Plan of Treatment: You were found to have mild urinary tract infection with Proteus mirabilis. You were given antibiotics. This infection could have contributed to symptoms of presentation. Please continue your antibiotics as prescribed.

## 2022-06-20 NOTE — DISCHARGE NOTE NURSING/CASE MANAGEMENT/SOCIAL WORK - NSDCVIVACCINE_GEN_ALL_CORE_FT
COVID-19, mRNA, LNP-S, PF, 30 mcg/0.3 mL dose, hesham-sucrose (Pfizer); 20-Jun-2022 15:06; Tanisha Young (RN); [employer]; [id number] (Exp. Date: 05-Jul-2022); IntraMuscular; Deltoid Left.; 0.3 milliLiter(s);   Tdap; 22-Nov-2020 17:17; Lorenza Avalos (RN); [employer]; [id number] (Exp. Date: 21-Jul-2022); IntraMuscular; Deltoid Left.; 0.5 milliLiter(s); VIS (VIS Published: 09-May-2013, VIS Presented: 22-Nov-2020);

## 2022-06-20 NOTE — DISCHARGE NOTE PROVIDER - CARE PROVIDER_API CALL
Marisol Cowart (NP; RN)  NP in Family Health  [address], [address]  [place] [address]  Phone: [phone]  Fax: [phone]  Follow Up Time:    Marisol Cowart (NP; RN)  NP in Family Health  [address], [address]  [place] [address]  Phone: [phone]  Fax: [phone]  Follow Up Time:     Chip Arrieta (DO)  Cardiology; Internal Medicine  [address], [address]  [place] [address]  Phone: [phone]  Fax: [phone]  Follow Up Time:

## 2022-06-20 NOTE — PROGRESS NOTE ADULT - SUBJECTIVE AND OBJECTIVE BOX
THE PATIENT WAS SEEN AND EXAMINED BY ME WITH THE HOUSESTAFF AND STROKE TEAM DURING MORNING ROUNDS.   HPI:     HPI: Patient SL is a 80 yo F PMH of Dementia, depression, glaucoma, muscular dystrophy, CVA in 11/2021 with residual R sided hemiparesis from nursing facility who presented 6/17 as a code stroke for new slurred speech and aphasia since 1 pm day prior with symptom resolution. At that time, per family, pt was saying words in a random order and not making sense. No worsening of baseline weakness per patient and family, LUE drift is not new (2/2 dystrophy). Pt was noted to be on eliquis at home, for unknown reason. Pt denied any recent HA, n/v, changes in vision/hearing, head trauma but has some neck pain.  Cannot ambulate independently at baseline, needs help with activities. NIHSS 7 (LUE drift (2/2 dystrophy), RUE some effort, LLE no drift, RLE no effort, moderate sensory loss in RUE/RLE). PreMRS 4. The evaluation at that time was new onset resolved dysarthria and aphasia possibly 2/2 TIA. CT head then showed no acute intracranial hemorrhage. 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 Patient was sent out from the ED. Stroke neuro attending Dr Richard Libman requested patient come to ED to obtain circulation vessel imaging. Here, patient and aide report no new worsening symptoms since before. Has been having L sided lower back pain limiting LLE movement. Confirmed with daughter patient taking eliquis at facility. Unclear etiology of stroke. *Update, called [facility] rehab for med rec and appears patient may not be on eliquis or aspirin.   NIHSS: 10  (LUE +1, RUE +3, LLE +1, RLE +4, sensory +1)  pain limited, incr weakness in RUE and LLE per NIHSS but pt/family state no new deficits  preMRS: 4    SUBJECTIVE: No events overnight.  No new neurologic complaints.  ROS reported negative unless otherwise noted.    acetaminophen     Tablet .. 650 milliGRAM(s) Oral every 6 hours PRN  artificial  tears Solution 1 Drop(s) Both EYES two times a day  aspirin  chewable 81 milliGRAM(s) Oral daily  atorvastatin 80 milliGRAM(s) Oral at bedtime  cefTRIAXone   IVPB      cefTRIAXone   IVPB 1000 milliGRAM(s) IV Intermittent every 24 hours  cyclobenzaprine 5 milliGRAM(s) Oral two times a day  DULoxetine 60 milliGRAM(s) Oral two times a day  enoxaparin Injectable 40 milliGRAM(s) SubCutaneous every 24 hours  famotidine    Tablet 20 milliGRAM(s) Oral at bedtime  melatonin 5 milliGRAM(s) Oral at bedtime  senna 2 Tablet(s) Oral at bedtime      PHYSICAL EXAM:   Vital Signs Last 24 Hrs  T(C): 36.7 (20 Jun 2022 05:25), Max: 36.8 (19 Jun 2022 21:49)  T(F): 98.1 (20 Jun 2022 05:25), Max: 98.2 (19 Jun 2022 21:49)  HR: 91 (20 Jun 2022 05:25) (65 - 91)  BP: 109/65 (20 Jun 2022 05:25) (109/65 - 140/78)  BP(mean): --  RR: 18 (20 Jun 2022 05:25) (18 - 18)  SpO2: 94% (20 Jun 2022 05:25) (94% - 97%)    General: No acute distress  HEENT: EOM intact, visual fields full  Abdomen: Soft, nontender, nondistended   Extremities: No edema    NEUROLOGICAL EXAM:  Mental status: Awake, alert, oriented x3, no aphasia, no neglect, normal memory   Cranial Nerves: No facial asymmetry, no nystagmus, no dysarthria,  tongue midline  Motor exam: Normal tone, no drift, 5/5 RUE, 5/5 RLE, 5/5 LUE, 5/5 LLE, normal fine finger movements.  Sensation: Intact to light touch   Coordination/ Gait: No dysmetria, YUKO intact and symmetric bilaterally    LABS:                        12.8   9.20  )-----------( 228      ( 19 Jun 2022 05:45 )             40.4    06-19    138  |  103  |  15  ----------------------------<  100<H>  3.9   |  25  |  0.50    Ca    9.1      19 Jun 2022 05:45          IMAGING: Reviewed by me.   (06.18.22 @ 20:33)   CT HEAD: No acute intracranial bleeding. Chronic infarction of the   posterior limb of left internal capsule with Wallerian degeneration into   the cerebral peduncle of the midbrain.    CTA BRAIN: Patent central intracranial circulation.No flow-limiting   stenosis or occlusion.    Focal filling defects in the superior sagittal sinus and right transverse   sinus favored to reflect atypical appearance of arachnoid granulation   rather than nonocclusive thrombi. No associated parenchymal abnormality   is noted. MRI brain with contrast may be helpful for further   clarification.    CTA NECK: Patent cervical vasculature. No flow limiting stenosis or   occlusion.     CT Brain Stroke Protocol (06.17.22 @ 11:45)    No acute intracranial hemorrhage.    Chronic lacunar infarct within the posterior limb of the left external   capsule with associated wallerian degeneration of the left corticospinal   tract. Findings are new when compared with 4/3/2016.      THE PATIENT WAS SEEN AND EXAMINED BY ME WITH THE HOUSESTAFF AND STROKE TEAM DURING MORNING ROUNDS.      HPI: Patient SL is a 80 yo F PMH of Dementia, depression, glaucoma, muscular dystrophy, CVA in 11/2021 with residual R sided hemiparesis from nursing facility who presented 6/17 as a code stroke for new slurred speech and aphasia since 1 pm day prior with symptom resolution. At that time, per family, pt was saying words in a random order and not making sense. No worsening of baseline weakness per patient and family, LUE drift is not new (2/2 dystrophy). Pt was noted to be on eliquis at home, for unknown reason. Pt denied any recent HA, n/v, changes in vision/hearing, head trauma but has some neck pain.  Cannot ambulate independently at baseline, needs help with activities. NIHSS 7 (LUE drift (2/2 dystrophy), RUE some effort, LLE no drift, RLE no effort, moderate sensory loss in RUE/RLE). PreMRS 4. The evaluation at that time was new onset resolved dysarthria and aphasia possibly 2/2 TIA. CT head then showed no acute intracranial hemorrhage. 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 Patient was sent out from the ED. Stroke neuro attending Dr Richard Libman requested patient come to ED to obtain circulation vessel imaging. Here, patient and aide report no new worsening symptoms since before. Has been having L sided lower back pain limiting LLE movement. Confirmed with daughter patient taking eliquis at facility. Unclear etiology of stroke. *Update, called [facility] rehab for med rec and appears patient may not be on eliquis or aspirin.   NIHSS: 10  (LUE +1, RUE +3, LLE +1, RLE +4, sensory +1)  pain limited, incr weakness in RUE and LLE per NIHSS but pt/family state no new deficits  preMRS: 4    SUBJECTIVE: No events overnight Feeling better, is eating and drinking.  Still has right arm weakness.  No new neurologic complaints.  ROS reported negative unless otherwise noted.  English is preferred language per patient.     acetaminophen     Tablet .. 650 milliGRAM(s) Oral every 6 hours PRN  artificial  tears Solution 1 Drop(s) Both EYES two times a day  aspirin  chewable 81 milliGRAM(s) Oral daily  atorvastatin 80 milliGRAM(s) Oral at bedtime  cefTRIAXone   IVPB      cefTRIAXone   IVPB 1000 milliGRAM(s) IV Intermittent every 24 hours  cyclobenzaprine 5 milliGRAM(s) Oral two times a day  DULoxetine 60 milliGRAM(s) Oral two times a day  enoxaparin Injectable 40 milliGRAM(s) SubCutaneous every 24 hours  famotidine    Tablet 20 milliGRAM(s) Oral at bedtime  melatonin 5 milliGRAM(s) Oral at bedtime  senna 2 Tablet(s) Oral at bedtime      PHYSICAL EXAM:   Vital Signs Last 24 Hrs  T(C): 36.7 (20 Jun 2022 05:25), Max: 36.8 (19 Jun 2022 21:49)  T(F): 98.1 (20 Jun 2022 05:25), Max: 98.2 (19 Jun 2022 21:49)  HR: 91 (20 Jun 2022 05:25) (65 - 91)  BP: 109/65 (20 Jun 2022 05:25) (109/65 - 140/78)  BP(mean): --  RR: 18 (20 Jun 2022 05:25) (18 - 18)  SpO2: 94% (20 Jun 2022 05:25) (94% - 97%)    General: No acute distress  HEENT: EOM intact, visual fields full  Abdomen: Soft, nontender, nondistended   Extremities: No edema    NEUROLOGICAL EXAM:  Mental status: Awake, alert, oriented x self and hospital, states year is 2001 and she is 89 years old,   no neglect, following simple commands   Cranial Nerves: subtle right nasolabial fold flattening no nystagmus, no dysarthria,  tongue midline  Motor exam: right hemiparesis: RUE 3/5 with drift, RLE trace movement with 0/5 in anterior tibialis, left side moves well against gravity   Sensation: Intact to light touch   Coordination/ Gait: gait not assessed     LABS:                        12.8   9.20  )-----------( 228      ( 19 Jun 2022 05:45 )             40.4    06-19    138  |  103  |  15  ----------------------------<  100<H>  3.9   |  25  |  0.50    Ca    9.1      19 Jun 2022 05:45          IMAGING: Reviewed by me.     MR Head w/wo IV Cont (06.19.22 @ 15:32)   1)  Evidence of an old hemorrhage in the left the posterior basal ganglia   and retrolenticular region with gliosis and associated wallerian   degeneration in the left brainstem.  2)  otherwise mild chronic ischemic changes and volume loss. No acute   abnormality suggested. No diffusion restriction or MR evidence of acute   ischemia. See above.     MR Venogram Head w/ IV Cont (06.19.22 @ 14:29)   No evidence of venous sinus thrombosis.       (06.18.22 @ 20:33)   CT HEAD: No acute intracranial bleeding. Chronic infarction of the   posterior limb of left internal capsule with Wallerian degeneration into   the cerebral peduncle of the midbrain.    CTA BRAIN: Patent central intracranial circulation.No flow-limiting   stenosis or occlusion.    Focal filling defects in the superior sagittal sinus and right transverse   sinus favored to reflect atypical appearance of arachnoid granulation   rather than nonocclusive thrombi. No associated parenchymal abnormality   is noted. MRI brain with contrast may be helpful for further   clarification.    CTA NECK: Patent cervical vasculature. No flow limiting stenosis or   occlusion.     CT Brain Stroke Protocol (06.17.22 @ 11:45)    No acute intracranial hemorrhage.    Chronic lacunar infarct within the posterior limb of the left external   capsule with associated wallerian degeneration of the left corticospinal   tract. Findings are new when compared with 4/3/2016.      THE PATIENT WAS SEEN AND EXAMINED BY ME WITH THE HOUSESTAFF AND STROKE TEAM DURING MORNING ROUNDS.      HPI: Patient SL is a 80 yo F PMH of Dementia, depression, glaucoma, muscular dystrophy, CVA in 11/2021 with residual R sided hemiparesis from nursing facility who presented 6/17 as a code stroke for new slurred speech and aphasia since 1 pm day prior with symptom resolution. At that time, per family, pt was saying words in a random order and not making sense. No worsening of baseline weakness per patient and family, LUE drift is not new (2/2 dystrophy). Pt was noted to be on eliquis at home, for unknown reason. Pt denied any recent HA, n/v, changes in vision/hearing, head trauma but has some neck pain.  Cannot ambulate independently at baseline, needs help with activities. NIHSS 7 (LUE drift (2/2 dystrophy), RUE some effort, LLE no drift, RLE no effort, moderate sensory loss in RUE/RLE). PreMRS 4. The evaluation at that time was new onset resolved dysarthria and aphasia possibly 2/2 TIA. CT head then showed no acute intracranial hemorrhage. 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 Patient was sent out from the ED. Stroke neuro attending Dr Richard Libman requested patient come to ED to obtain circulation vessel imaging. Here, patient and aide report no new worsening symptoms since before. Has been having L sided lower back pain limiting LLE movement. Confirmed with daughter patient taking eliquis at facility. Unclear etiology of stroke. *Update, called [facility] rehab for med rec and appears patient may not be on eliquis or aspirin.   NIHSS: 10  (LUE +1, RUE +3, LLE +1, RLE +4, sensory +1)  pain limited, incr weakness in RUE and LLE per NIHSS but pt/family state no new deficits  preMRS: 4    SUBJECTIVE: No events overnight Feeling better, is eating and drinking.  Still has right arm weakness.  No new neurologic complaints.  ROS reported negative unless otherwise noted.  English is preferred language per patient.     acetaminophen     Tablet .. 650 milliGRAM(s) Oral every 6 hours PRN  artificial  tears Solution 1 Drop(s) Both EYES two times a day  aspirin  chewable 81 milliGRAM(s) Oral daily  atorvastatin 80 milliGRAM(s) Oral at bedtime  cefTRIAXone   IVPB      cefTRIAXone   IVPB 1000 milliGRAM(s) IV Intermittent every 24 hours  cyclobenzaprine 5 milliGRAM(s) Oral two times a day  DULoxetine 60 milliGRAM(s) Oral two times a day  enoxaparin Injectable 40 milliGRAM(s) SubCutaneous every 24 hours  famotidine    Tablet 20 milliGRAM(s) Oral at bedtime  melatonin 5 milliGRAM(s) Oral at bedtime  senna 2 Tablet(s) Oral at bedtime      PHYSICAL EXAM:   Vital Signs Last 24 Hrs  T(C): 36.7 (20 Jun 2022 05:25), Max: 36.8 (19 Jun 2022 21:49)  T(F): 98.1 (20 Jun 2022 05:25), Max: 98.2 (19 Jun 2022 21:49)  HR: 91 (20 Jun 2022 05:25) (65 - 91)  BP: 109/65 (20 Jun 2022 05:25) (109/65 - 140/78)  BP(mean): --  RR: 18 (20 Jun 2022 05:25) (18 - 18)  SpO2: 94% (20 Jun 2022 05:25) (94% - 97%)    General: No acute distress  HEENT: EOM intact, visual fields full  Abdomen: Soft, nontender, nondistended   Extremities: No edema    NEUROLOGICAL EXAM:  Mental status: Awake, alert, oriented x self and hospital, states year is 2001 and she is 89 years old,   no neglect, following simple commands   Cranial Nerves: subtle right nasolabial fold flattening no nystagmus, no dysarthria,  tongue midline  Motor exam: right hemiparesis: RUE deltoid 0/5; WE 3/5;, RLE proximally  trace movement with 0/5 in anterior tibialis, left side moves well against gravity   Sensation: Intact to light touch   Coordination/ Gait: gait not assessed     LABS:                        12.8   9.20  )-----------( 228      ( 19 Jun 2022 05:45 )             40.4    06-19    138  |  103  |  15  ----------------------------<  100<H>  3.9   |  25  |  0.50    Ca    9.1      19 Jun 2022 05:45          IMAGING: Reviewed by me.     MR Head w/wo IV Cont (06.19.22 @ 15:32)   1)  Evidence of an old hemorrhage in the left the posterior basal ganglia   and retrolenticular region with gliosis and associated wallerian   degeneration in the left brainstem.  2)  otherwise mild chronic ischemic changes and volume loss. No acute   abnormality suggested. No diffusion restriction or MR evidence of acute   ischemia. See above.     MR Venogram Head w/ IV Cont (06.19.22 @ 14:29)   No evidence of venous sinus thrombosis.       (06.18.22 @ 20:33)   CT HEAD: No acute intracranial bleeding. Chronic infarction of the   posterior limb of left internal capsule with Wallerian degeneration into   the cerebral peduncle of the midbrain.    CTA BRAIN: Patent central intracranial circulation.No flow-limiting   stenosis or occlusion.    Focal filling defects in the superior sagittal sinus and right transverse   sinus favored to reflect atypical appearance of arachnoid granulation   rather than nonocclusive thrombi. No associated parenchymal abnormality   is noted. MRI brain with contrast may be helpful for further   clarification.    CTA NECK: Patent cervical vasculature. No flow limiting stenosis or   occlusion.     CT Brain Stroke Protocol (06.17.22 @ 11:45)    No acute intracranial hemorrhage.    Chronic lacunar infarct within the posterior limb of the left external   capsule with associated wallerian degeneration of the left corticospinal   tract. Findings are new when compared with 4/3/2016.

## 2022-06-20 NOTE — CHART NOTE - NSCHARTNOTEFT_GEN_A_CORE
Spoke to daughter Ms. Godfrey, updated with hospital stay. Prior to discharge back to [facility], daughter agreed for 4th booster of COVID 19 vaccine. Vaccine ordered.    Danielle Velez  Neurology PGY2

## 2022-06-20 NOTE — PROGRESS NOTE ADULT - ASSESSMENT
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 At that time, per family, pt was saying words in a random order and not making sense. No worsening of baseline weakness per patient and family, LUE drift is not new (2/2 dystrophy). Pt reported to be on eliquis at home, for unknown reason.   The evaluation at that time was new onset resolved dysarthria and aphasia possibly 2/2 TIA. CT head then showed no acute intracranial hemorrhage. 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  CTA showed patent intracranial arteries  and neck vaculature however focal filling defects in the superior sagittal sinus and right transverse sinus favored to reflect atypical appearance of arachnoid granulation rather than nonocclusive thrombi. .    NEURO: neurologically without acute change, continue close monitoring for neurologic deterioration, normotension as tolerated , home statin regimen if applicable,  MRI Brain w/o, MRA Head w/o and Neck w/contrast. Physical therapy/OT: [facility]     ANTITHROMBOTIC THERAPY: ASA     PULMONARY: CT chest:  1.  Scant bilateral pleural effusions and bilateral linear atelectasis. 2.  The abnormality described on the radiograph is likely secondary to  summation of shadows., protecting airway, saturating well, encourage mobility and incentive spirometry as tolerated     CARDIOVASCULAR:  TTE: EF 70%, mild AR , cardiac monitoring                              SBP goal: normotension as tolerated     GASTROINTESTINAL:  dysphagia screen passed, tolerating diet       Diet: puree     RENAL: BUN/Cr within range, maintain adequate hydration , good urine output      Na Goal: Greater than 135     Hopper: n     HEMATOLOGY: H/H without acute change, no active bleeding, Platelets 228, she should have all age and risk appropriate malignancy screenings      DVT ppx: Heparin s.c [] LMWH []     ID: afebrile, no leukocytosis , on ceftriaxone: concern for UTI given UA turbid with many bacteria, Ucx: proteus, sensitivities appreciated     OTHER:     DISPOSITION: [facility]       CORE MEASURES:        Admission NIHSS: 10     TPA: [] YES [x] NO      LDL/HDL: 55/69     Depression Screen: p     Statin Therapy:  y      Dysphagia Screen: [x] PASS [] FAIL     Smoking [] YES [x] NO      Afib [] YES [x] NO     Stroke Education [x] YES [] NO    Obtain screening lower extremity venous ultrasound in patients who meet 1 or more of the following criteria as patient is high risk for DVT/PE on admission:   [] History of DVT/PE  []Hypercoagulable states (Factor V Leiden, Cancer, OCP, etc. )  [x]Prolonged immobility (hemiplegia/hemiparesis/post operative or any other extended immobilization)  [x] Transferred from outside facility (Rehab or Long term care)  [] Age </= to 50 ASSESSMENT: 80 yo women PMH of Dementia, depression, glaucoma, muscular dystrophy, CVA in 11/2021 with residual R sided hemiparesis from nursing facility who presented 6/17/22 as a code stroke for new slurred speech and aphasia since 1 pm day prior with symptom resolution. At that time, per family, pt was saying words in a random order and not making sense. No worsening of baseline weakness per patient and family, LUE drift is not new (2/2 dystrophy). Pt reported to be on eliquis at home, for unknown reason.   The evaluation at that time was new onset resolved dysarthria and aphasia possibly 2/2 TIA. CT head then showed no acute intracranial hemorrhage. 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  CTA showed patent intracranial arteries  and neck vaculature however focal filling defects in the superior sagittal sinus and right transverse sinus favored to reflect atypical appearance of arachnoid granulation rather than nonocclusive thrombi. MRI with chronic left basal ganglia hemorrhage, no acute infarction or venous thrombosis     Impression: Possible TIA of undetermined source.  She has underlying UTI which may be contributory as well.     NEURO: neurologically without acute change, continue close monitoring for neurologic deterioration, normotension as tolerated , home statin regimen if applicable,  MRI Brain w/o, MRA Head w/o and Neck w/contrast. Physical therapy/OT: [facility]     ANTITHROMBOTIC THERAPY: ASA for now, previously noted use of Apixaban should be clarified and continued if medically indicated and no contraindications.     PULMONARY: CT chest:  1.  Scant bilateral pleural effusions and bilateral linear atelectasis. 2.  The abnormality described on the radiograph is likely secondary to  summation of shadows., protecting airway, saturating well, encourage mobility and incentive spirometry as tolerated     CARDIOVASCULAR:  TTE: EF 70%, mild AR , cardiac monitoring with ILR to screen for atrial fibrillations either prior to discharge or while at [facility]                         SBP goal: normotension as tolerated     GASTROINTESTINAL:  dysphagia screen passed, tolerating diet       Diet: minced/moist , mildly thick liquids     RENAL: BUN/Cr within range, maintain adequate hydration , good urine output      Na Goal: Greater than 135     Hopper: n     HEMATOLOGY: H/H without acute change, no active bleeding, Platelets 228, she should have all age and risk appropriate malignancy screenings      DVT ppx: Heparin s.c [] LMWH []     ID: afebrile, no leukocytosis , on ceftriaxone: concern for UTI given UA turbid with many bacteria, Ucx: proteus, sensitivities appreciated, transition to PO regimen for discharge.     OTHER: condition and plan of care to be d/w daughter per team, CM notes patient will need 4th dose of covid vaccine or quarantine upon arrival, team to discuss as well.     DISPOSITION: [facility] to [facility] today pending the noted.       CORE MEASURES:        Admission NIHSS: 10     TPA: [] YES [x] NO      LDL/HDL: 55/69     Depression Screen: 0     Statin Therapy:  y      Dysphagia Screen: [x] PASS [] FAIL     Smoking [] YES [x] NO      Afib [] YES [x] NO     Stroke Education [x] YES [] NO    Obtain screening lower extremity venous ultrasound in patients who meet 1 or more of the following criteria as patient is high risk for DVT/PE on admission:   [] History of DVT/PE  []Hypercoagulable states (Factor V Leiden, Cancer, OCP, etc. )  [x]Prolonged immobility (hemiplegia/hemiparesis/post operative or any other extended immobilization)  [x] Transferred from outside facility (Rehab or Long term care)  [] Age </= to 50 ASSESSMENT: 82 yo women PMH of Dementia, depression, glaucoma, muscular dystrophy, CVA in 11/2021 with residual R sided hemiparesis from nursing facility who presented 6/17/22 as a code stroke for new slurred speech and aphasia since 1 pm day prior with symptom resolution. At that time, per family, pt was saying words in a random order and not making sense. No worsening of baseline weakness per patient and family, LUE drift is not new (2/2 dystrophy). Pt reported to be on eliquis at home, for unknown reason.   The evaluation at that time was new onset resolved dysarthria and aphasia possibly 2/2 TIA. CT head then showed no acute intracranial hemorrhage. 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  CTA showed patent intracranial arteries  and neck vaculature however focal filling defects in the superior sagittal sinus and right transverse sinus favored to reflect atypical appearance of arachnoid granulation rather than nonocclusive thrombi. MRI with chronic left basal ganglia hemorrhage, no acute infarction or venous thrombosis     Impression: Possible TIA of undetermined mechanism.  She has underlying UTI which may be contributory as well.     NEURO: neurologically without acute change, continue close monitoring for neurologic deterioration, normotension as tolerated , home statin regimen if applicable,  MRI Brain w/o, MRA Head w/o and Neck w/contrast. Physical therapy/OT: [facility]     ANTITHROMBOTIC THERAPY: ASA for now, previously noted use of Apixaban has not been confirmed; should be clarified and continued if medically indicated and no contraindications.     PULMONARY: CT chest:  1.  Scant bilateral pleural effusions and bilateral linear atelectasis. 2.  The abnormality described on the radiograph is likely secondary to  summation of shadows., protecting airway, saturating well, encourage mobility and incentive spirometry as tolerated     CARDIOVASCULAR:  TTE: EF 70%, mild AR , cardiac monitoring with ILR to screen for atrial fibrillation either prior to discharge or while at [facility].                         SBP goal: normotension as tolerated     GASTROINTESTINAL:  dysphagia screen passed, tolerating diet       Diet: minced/moist , mildly thick liquids     RENAL: BUN/Cr within range, maintain adequate hydration , good urine output      Na Goal: Greater than 135     Hopper: n     HEMATOLOGY: H/H without acute change, no active bleeding, Platelets 228, she should have all age and risk appropriate malignancy screenings      DVT ppx: Heparin s.c [] LMWH []     ID: afebrile, no leukocytosis , on ceftriaxone: concern for UTI given UA turbid with many bacteria, Ucx: proteus, sensitivities appreciated, transition to PO regimen for discharge.     OTHER: condition and plan of care to be d/w daughter per team, CM notes patient will need 4th dose of covid vaccine or quarantine upon arrival, team to discuss as well.     DISPOSITION: [facility] to [facility] today pending the noted.       CORE MEASURES:        Admission NIHSS: 10     TPA: [] YES [x] NO      LDL/HDL: 55/69     Depression Screen: 0     Statin Therapy:  y      Dysphagia Screen: [x] PASS [] FAIL     Smoking [] YES [x] NO      Afib [] YES [x] NO     Stroke Education [x] YES [] NO    Obtain screening lower extremity venous ultrasound in patients who meet 1 or more of the following criteria as patient is high risk for DVT/PE on admission:   [] History of DVT/PE  []Hypercoagulable states (Factor V Leiden, Cancer, OCP, etc. )  [x]Prolonged immobility (hemiplegia/hemiparesis/post operative or any other extended immobilization)  [x] Transferred from outside facility (Rehab or Long term care)  [] Age </= to 50

## 2022-07-12 ENCOUNTER — RESULT REVIEW (OUTPATIENT)
Age: 82
End: 2022-07-12

## 2022-09-01 NOTE — ED PROVIDER NOTE - EXITCARE/DISCHARGE INSTRUCTIONS
Launch [employer] and print the 'Prescriptions from this Visit' Report Patient seen overnight by telepsychiatry service for suicide ideation.  Follow up done to today and patient is observed to still be suicidal with thought of dying. She is also expressing feeling of hopelessness and worthlessness. She talks extensively about the situation at home, and how her mood has been very depressed for many months. She did try to reach out to mental health service unsuccessfully for the past few months and most recently yesterday. Her intent taking the overdose was to kill herself. She feels she is controlled by her  and does not know how to escape. Upon inquiring about where she obtains the xanax, patient replied, she was using them for anxiety, and  she got them from someone  else.

## 2023-04-10 ENCOUNTER — APPOINTMENT (OUTPATIENT)
Dept: OBGYN | Facility: CLINIC | Age: 83
End: 2023-04-10
Payer: MEDICARE

## 2023-04-25 NOTE — H&P PST ADULT - MOTOR
To confirm, Your doctor has instructed you that surgery is scheduled for: 4/26/23 with Dr. Pimentel    Please report to [facility], Registration the morning of surgery. You must check-in and receive a wristband before going to your procedure.    Pre-Op will call the afternoon prior to surgery between 1:00 and 6:00 PM with the final arrival time.  Phone number: [phone]    PLEASE NOTE:  The surgery schedule has many variables which may affect the time of your surgery case.  Family members should be available if your surgery time changes.  Plan to be here the day of your procedure between 4-6 hours.    MEDICATIONS:  TAKE ONLY THESE MEDICATIONS WITH A SMALL SIP OF WATER THE MORNING OF YOUR PROCEDURE:  TRELEGY, ALBUTEROL, ZYRTEC IF NEEDED, HYDROCODONE IF NEEDED, ZOFRAN IF NEEDED      DO NOT TAKE THESE MEDICATIONS 5-7 DAYS PRIOR to your procedure or per your surgeon's request:   ASPIRIN, ALEVE, ADVIL, IBUPROFEN, FISH OIL VITAMIN E, HERBALS  (May take Tylenol)    ONLY if you are prescribed any types of blood thinners such as:  Aspirin, Coumadin, Plavix, Pradaxa, Xarelto, Aggrenox, Effient, Eliquis, Savasya, Brilinta, or any other, ask your surgeon whether you should stop taking them and how long before surgery you should stop.  You may also need to verify with the prescribing physician if it is ok to stop your medication.      INSTRUCTIONS IMPORTANT!!  Do not eat or drink anything between midnight and the time of your procedure- this includes gum, mints, and candy.  Do not smoke or drink alcoholic beverages 24 hours prior to your procedure.  Shower the night before AND the morning of your procedure with a Chlorhexidine wash such as Hibiclens or Dial antibacterial soap from the neck down.  Do not get it on your face or in your eyes.  You may use your own shampoo and face wash. This helps your skin to be as bacteria free as possible.    If you wear contact lenses, dentures, hearing aids or glasses,  bring a container to put them in during surgery and give to a family member for safe keeping.  Please leave all jewelry, piercing's and valuables at home.   DO NOT remove hair from the surgery site.  Do not shave the incision site unless you are given specific instructions to do so.    ONLY if you have been diagnosed with sleep apnea please bring your C-PAP machine.  ONLY if you wear home oxygen please bring your portable oxygen tank the day of your procedure.  ONLY if you have a history of OPEN HEART SURGERY you will need a clearance from your Cardiologist per Anesthesia.      ONLY for patients requiring bowel prep, written instructions will be given by your doctor's office.  ONLY if you have a neuro stimulator, please bring the controller with you the morning of surgery  ONLY if a type and screen test is needed before surgery, please return:  If your doctor has scheduled you for an overnight stay, bring a small overnight bag with any personal items you need.  Make arrangements in advance for transportation home by a responsible adult.  It is not safe to drive a vehicle during the 24 hours after anesthesia.      [facility] Visitor Policy    Effective September 26, 2022    [facility] will resume routine visitation for COVID-19 negative patients, including inpatients, outpatients, and procedural areas, in accordance with local campus procedures.    All [facility] facilities and properties are tobacco free.  Smoking is NOT allowed.   If you have any questions about these instructions, call Pre-Op Admit  Nursing at [phone] or the Pre-Op Day Surgery Unit at [phone].                        3/5 weakness in LUE and bilateral lower extremities   1/5 right hand d/t contracture 1/5 weakness in LUE and bilateral lower extremities   1/5 right hand d/t contracture

## 2023-05-04 ENCOUNTER — APPOINTMENT (OUTPATIENT)
Dept: OBGYN | Facility: CLINIC | Age: 83
End: 2023-05-04
Payer: MEDICARE

## 2023-05-04 VITALS
BODY MASS INDEX: 29.14 KG/M2 | WEIGHT: 154.32 LBS | HEIGHT: 61 IN | DIASTOLIC BLOOD PRESSURE: 78 MMHG | SYSTOLIC BLOOD PRESSURE: 150 MMHG

## 2023-05-04 DIAGNOSIS — N95.0 POSTMENOPAUSAL BLEEDING: ICD-10-CM

## 2023-05-04 DIAGNOSIS — Z01.419 ENCOUNTER FOR GYNECOLOGICAL EXAMINATION (GENERAL) (ROUTINE) W/OUT ABNORMAL FINDINGS: ICD-10-CM

## 2023-05-04 DIAGNOSIS — Z11.51 ENCOUNTER FOR SCREENING FOR HUMAN PAPILLOMAVIRUS (HPV): ICD-10-CM

## 2023-05-04 PROCEDURE — 99203 OFFICE O/P NEW LOW 30 MIN: CPT

## 2023-05-08 PROBLEM — N95.0 POSTMENOPAUSAL BLEEDING: Status: ACTIVE | Noted: 2023-05-08

## 2023-05-08 NOTE — PHYSICAL EXAM
[Chaperone Present] : A chaperone was present in the examining room during all aspects of the physical examination [FreeTextEntry1] : yaquelin [Alert] : alert [No Acute Distress] : no acute distress [Soft] : soft [Non-tender] : non-tender [Non-distended] : non-distended [No Lesions] : no lesions [No Mass] : no mass [Labia Majora] : normal [Labia Minora] : normal [Normal] : normal [Uterine Adnexae] : normal [FreeTextEntry5] : no cervical lesion [FreeTextEntry6] : minimal prolapse

## 2023-05-08 NOTE — REASON FOR VISIT
[Consultation] : consultation for [Postmenopausal Bleeding] : postmenopausal bleeding [FreeTextEntry1] : Dr Dk Noe

## 2023-05-08 NOTE — HISTORY OF PRESENT ILLNESS
[FreeTextEntry1] : Pt is an 83 yo who presents form [facility] in a wheel chair s/p major stroke she is a  reports normal \par hx of paps last one 20 years ago only using aspirin for anticoag.\par Pt is aphasic and info from daughter and medical hx provided.\par Only one episdoe of vaginal bleeding last approx 1-2 months ago.

## 2023-05-08 NOTE — PLAN
[FreeTextEntry1] : Pt with episode of vaginal bleeding no lesion or active bleeding found\par Plan pelvic sono, pap completed.  Will discuss any further followup and treatment after results.\par During this visit 30 minutes were spent face-to-face with greater than 50% of this time dedicated to counseling.\par

## 2023-05-13 LAB
CYTOLOGY CVX/VAG DOC THIN PREP: ABNORMAL
HPV HIGH+LOW RISK DNA PNL CVX: NOT DETECTED

## 2023-06-02 ENCOUNTER — APPOINTMENT (OUTPATIENT)
Dept: ULTRASOUND IMAGING | Facility: CLINIC | Age: 83
End: 2023-06-02

## 2023-06-16 ENCOUNTER — OUTPATIENT (OUTPATIENT)
Dept: OUTPATIENT SERVICES | Facility: HOSPITAL | Age: 83
LOS: 1 days | End: 2023-06-16
Payer: MEDICARE

## 2023-06-16 ENCOUNTER — APPOINTMENT (OUTPATIENT)
Dept: ULTRASOUND IMAGING | Facility: CLINIC | Age: 83
End: 2023-06-16
Payer: MEDICARE

## 2023-06-16 DIAGNOSIS — M67.90 UNSPECIFIED DISORDER OF SYNOVIUM AND TENDON, UNSPECIFIED SITE: Chronic | ICD-10-CM

## 2023-06-16 DIAGNOSIS — S42.301A UNSPECIFIED FRACTURE OF SHAFT OF HUMERUS, RIGHT ARM, INITIAL ENCOUNTER FOR CLOSED FRACTURE: Chronic | ICD-10-CM

## 2023-06-16 DIAGNOSIS — N95.0 POSTMENOPAUSAL BLEEDING: ICD-10-CM

## 2023-06-16 PROCEDURE — 76856 US EXAM PELVIC COMPLETE: CPT

## 2023-06-16 PROCEDURE — 76856 US EXAM PELVIC COMPLETE: CPT | Mod: 26

## 2023-06-21 DIAGNOSIS — N93.9 ABNORMAL UTERINE AND VAGINAL BLEEDING, UNSPECIFIED: ICD-10-CM

## 2023-07-18 ENCOUNTER — APPOINTMENT (OUTPATIENT)
Dept: OTOLARYNGOLOGY | Facility: CLINIC | Age: 83
End: 2023-07-18
Payer: MEDICARE

## 2023-07-18 VITALS
BODY MASS INDEX: 29.07 KG/M2 | WEIGHT: 154 LBS | OXYGEN SATURATION: 97 % | SYSTOLIC BLOOD PRESSURE: 123 MMHG | HEART RATE: 85 BPM | HEIGHT: 61 IN | DIASTOLIC BLOOD PRESSURE: 71 MMHG

## 2023-07-18 DIAGNOSIS — H72.01 CENTRAL PERFORATION OF TYMPANIC MEMBRANE, RIGHT EAR: ICD-10-CM

## 2023-07-18 DIAGNOSIS — H61.23 IMPACTED CERUMEN, BILATERAL: ICD-10-CM

## 2023-07-18 DIAGNOSIS — H90.3 SENSORINEURAL HEARING LOSS, BILATERAL: ICD-10-CM

## 2023-07-18 PROCEDURE — 92557 COMPREHENSIVE HEARING TEST: CPT

## 2023-07-18 PROCEDURE — 92567 TYMPANOMETRY: CPT

## 2023-07-18 PROCEDURE — 69210 REMOVE IMPACTED EAR WAX UNI: CPT

## 2023-07-18 PROCEDURE — 99214 OFFICE O/P EST MOD 30 MIN: CPT | Mod: 25

## 2023-07-18 NOTE — PHYSICAL EXAM
[Midline] : trachea located in midline position [Normal] : no rashes [de-identified] : cerumen in the ear canals; once removed normal EACs [de-identified] : perforation of the right TM

## 2023-07-18 NOTE — HISTORY OF PRESENT ILLNESS
[de-identified] : Patient had a stroke and is currently living at [facility]. She comes in hearing changes and an ear cleaning. SHe does not have an pain in the ears or complaints, but they do clean her ears at the Home using Drops, so daughter would like the ears to be checked. She does not wear any hearing aids right now but she knows that she may need them as her hearing has been progressively diminishing

## 2023-07-18 NOTE — ASSESSMENT
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

## 2023-07-18 NOTE — CONSULT LETTER
[Dear  ___] : Dear  [unfilled], [Consult Letter:] : I had the pleasure of evaluating your patient, [unfilled]. [Please see my note below.] : Please see my note below. [Consult Closing:] : Thank you very much for allowing me to participate in the care of this patient.  If you have any questions, please do not hesitate to contact me. [Sincerely,] : Sincerely, [FreeTextEntry3] : Pelon Wylie MD\par [facility]\par Otolaryngology and Facial Plastics\par Associated Professor, Alan\par

## 2023-07-18 NOTE — ADDENDUM
[FreeTextEntry1] : I, Dr. Wylie personally performed the evaluation and management (E/M) services including all necessary procedures, for this new patient. That E/M includes conducting the clinically appropriate initial history &/or exam, assessing all conditions, and establishing the plan of care. Today, my RAMBO, Katarzyna Barone, was here to observe &/or participate in the visit & follow plan of care established by me.\par \par \par

## 2023-09-14 ENCOUNTER — RESULT REVIEW (OUTPATIENT)
Age: 83
End: 2023-09-14

## 2023-09-15 ENCOUNTER — RESULT REVIEW (OUTPATIENT)
Age: 83
End: 2023-09-15

## 2023-10-02 NOTE — DISCHARGE NOTE PROVIDER - CARE PROVIDER_API CALL
Johnnie Smalls  ORTHOPAEDIC SURGERY  [address], [address]  [place] [address]  Phone: [phone]  Fax: [phone]  Follow Up Time:    Cantharidin Counseling:  I discussed with the patient the risks of Cantharidin including but not limited to pain, redness, burning, itching, and blistering.

## 2024-01-29 NOTE — PROGRESS NOTE ADULT - I WAS PHYSICALLY PRESENT FOR THE KEY PORTIONS OF THE EVALUATION AND MANAGEMENT (E/M) SERVICE PROVIDED.  I AGREE WITH THE ABOVE HISTORY, PHYSICAL, AND PLAN WHICH I HAVE REVIEWED AND EDITED WHERE APPROPRIATE
Attempted to contact patient to convey results, no answer but did leave message advising patient I would be sending a message to her patient portal. Results conveyed in portal message. Clinic number provided on patient voicemail for her to call with any questions.   
Statement Selected

## 2024-05-14 NOTE — STROKE CODE NOTE - NIH STROKE SCALE: 6B. MOTOR LEG, RIGHT, QM
Thank you for allowing us to care for you today!   We want to ensure we can follow your treatment plan and we strive to give you the best outcomes and experience possible.   If you ever have a life threatening emergency and call 911 - for an ambulance (EMS)   Our providers can only care for you at:   [facility] or [facility].   Even if you have someone take you or you drive yourself we can only care for you in a [facility] facility. Our providers are not setup at the other healthcare locations!   **It is YOUR responsibilty to bring medication bottles and/or updated medication list to EACH APPOINTMENT. This will allow us to better serve you and all your healthcare needs**  We are committed to providing you the best care possible.    If you receive a survey after visiting one of our offices, please take time to share your experience concerning your physician office visit.  These surveys are confidential and no health information about you is shared.    We are eager to improve for you and we are counting on your feedback to help make that happen.         (3) No effort against gravity; leg falls to bed immediately

## 2024-07-02 ENCOUNTER — RESULT REVIEW (OUTPATIENT)
Age: 84
End: 2024-07-02

## 2024-11-04 NOTE — ED PROVIDER NOTE - PRINCIPAL DIAGNOSIS
Called and spoke with the patient, patient notified of the results below from Sahara Diaz, Mri set up with the patient, patient verbalized understanding of this.    Per Sahara Diaz NP-et patient know her CT of abdomen and pelvis showed two pancreatic hypodensities (unchanged), atherosclerosis, diverticulosis without evidence of diverticulitis, and degenerative change of the thoracolumbar spine, hips, and symphysis pubis. Recommend a MRI of abdomen to further evaluate pancreas.   
Let patient know her CT of abdomen and pelvis showed two pancreatic hypodensities (unchanged), atherosclerosis, diverticulosis without evidence of diverticulitis, and degenerative change of the thoracolumbar spine, hips, and symphysis pubis. Recommend a MRI of abdomen to further evaluate pancreas.  
Other type I or II open intra-articular fracture of distal end of right radius, initial encounter

## 2025-01-28 ENCOUNTER — RESULT REVIEW (OUTPATIENT)
Age: 85
End: 2025-01-28

## 2025-02-26 NOTE — ED PROVIDER NOTE - DATE/TIME 2
----- Message from Darinel Palma sent at 2/26/2025  7:01 AM EST -----  Patient's urinalysis does reveal little bit of blood.  It appears that she has had this in the past.  We probably need to recheck in the next 3 to 6 weeks.  If she continues to have blood at that time we may need to pursue with a workup.  Her TSH is diminished.  Will check her free T4 and T3 levels.  Her kidney and liver function tests are appropriate.  Hemoglobin A1c is good at 5.6%.  Vitamin D level is acceptable but still on the low side.  B12 level is good.  CBC does not reveal any abnormality.  Uric acid level is good as well as her C-reactive protein.   17-Jun-2022 16:08
